# Patient Record
Sex: FEMALE | Race: WHITE | NOT HISPANIC OR LATINO | Employment: FULL TIME
[De-identification: names, ages, dates, MRNs, and addresses within clinical notes are randomized per-mention and may not be internally consistent; named-entity substitution may affect disease eponyms.]

---

## 2017-11-12 ENCOUNTER — HEALTH MAINTENANCE LETTER (OUTPATIENT)
Age: 37
End: 2017-11-12

## 2020-03-01 ENCOUNTER — HEALTH MAINTENANCE LETTER (OUTPATIENT)
Age: 40
End: 2020-03-01

## 2020-11-16 ENCOUNTER — OFFICE VISIT (OUTPATIENT)
Dept: FAMILY MEDICINE | Facility: CLINIC | Age: 40
End: 2020-11-16
Payer: COMMERCIAL

## 2020-11-16 ENCOUNTER — HOSPITAL ENCOUNTER (OUTPATIENT)
Dept: MRI IMAGING | Facility: CLINIC | Age: 40
Discharge: HOME | End: 2020-11-16
Attending: NURSE PRACTITIONER | Admitting: NURSE PRACTITIONER
Payer: COMMERCIAL

## 2020-11-16 ENCOUNTER — ANCILLARY PROCEDURE (OUTPATIENT)
Dept: GENERAL RADIOLOGY | Facility: CLINIC | Age: 40
End: 2020-11-16
Attending: NURSE PRACTITIONER
Payer: COMMERCIAL

## 2020-11-16 VITALS
HEIGHT: 68 IN | WEIGHT: 172 LBS | HEART RATE: 80 BPM | TEMPERATURE: 98.5 F | SYSTOLIC BLOOD PRESSURE: 112 MMHG | RESPIRATION RATE: 18 BRPM | BODY MASS INDEX: 26.07 KG/M2 | DIASTOLIC BLOOD PRESSURE: 72 MMHG

## 2020-11-16 DIAGNOSIS — S39.012A STRAIN OF LUMBAR REGION, INITIAL ENCOUNTER: Primary | ICD-10-CM

## 2020-11-16 DIAGNOSIS — M51.369 DDD (DEGENERATIVE DISC DISEASE), LUMBAR: ICD-10-CM

## 2020-11-16 DIAGNOSIS — T14.8XXA NERVE COMPRESSION: ICD-10-CM

## 2020-11-16 DIAGNOSIS — S39.012A STRAIN OF LUMBAR REGION, INITIAL ENCOUNTER: ICD-10-CM

## 2020-11-16 PROCEDURE — 72100 X-RAY EXAM L-S SPINE 2/3 VWS: CPT | Performed by: RADIOLOGY

## 2020-11-16 PROCEDURE — 99214 OFFICE O/P EST MOD 30 MIN: CPT | Performed by: NURSE PRACTITIONER

## 2020-11-16 PROCEDURE — 72148 MRI LUMBAR SPINE W/O DYE: CPT

## 2020-11-16 RX ORDER — CYCLOBENZAPRINE HCL 5 MG
5-10 TABLET ORAL 3 TIMES DAILY PRN
Qty: 60 TABLET | Refills: 0 | Status: SHIPPED | OUTPATIENT
Start: 2020-11-16 | End: 2021-03-22

## 2020-11-16 RX ORDER — HYDROCODONE BITARTRATE AND ACETAMINOPHEN 5; 325 MG/1; MG/1
1 TABLET ORAL EVERY 12 HOURS PRN
Qty: 14 TABLET | Refills: 0 | Status: SHIPPED | OUTPATIENT
Start: 2020-11-16 | End: 2020-11-19

## 2020-11-16 ASSESSMENT — MIFFLIN-ST. JEOR: SCORE: 1505.75

## 2020-11-16 NOTE — LETTER
[facility]  [address] [place] [address]  Phone: [phone]  Fax: [phone]    November 16, 2020        Pattie Easton  [address] [facility] [address]          To whom it may concern:    RE: Pattie Easton    Patient was seen and treated today at our clinic and missed work.  Patient may return to work 11/23/2020  with the following:  Light duty-unable to repetitively lift more than 5 lbs pounds    Please contact me for questions or concerns.      Sincerely,        ALFREDO Tapia CNP

## 2020-11-16 NOTE — PROGRESS NOTES
"Subjective     Pattie Easton is a 39 year old female who presents to clinic today for the following health issues:    HPI         Back Pain  Onset/Duration: on and off for months- worse the last three days  Description:   Location of pain: low back bilateral and middle of back bilateral  Character of pain: sharp  Pain radiation: both hips and legs  New numbness or weakness in legs, not attributed to pain: YES  Intensity: moderate  Progression of Symptoms: worsening  History:   Specific cause: none  Pain interferes with job: YES  History of back problems: Yes  Any previous MRI or X-rays: None  Sees a specialist for back pain: No  Alleviating factors:   Improved by: nothing    Precipitating factors:  Worsened by: Nothing  Therapies tried and outcome: advil, tizanidine    Accompanying Signs & Symptoms:  Risk of Fracture: None  Risk of Cauda Equina: None  Risk of Infection: None  Risk of Cancer: None  Risk of Ankylosing Spondylitis: Onset at age <35, male, AND morning back stiffness  no       Review of Systems   Constitutional, HEENT, cardiovascular, pulmonary, GI, , musculoskeletal, neuro, skin, endocrine and psych systems are negative, except as otherwise noted.      Objective    /72 (BP Location: Right arm, Cuff Size: Adult Regular)   Pulse 80   Temp 98.5  F (36.9  C) (Tympanic)   Resp 18   Ht 1.715 m (5' 7.5\")   Wt 78 kg (172 lb)   BMI 26.54 kg/m    Body mass index is 26.54 kg/m .  Physical Exam   GENERAL: healthy, alert and no distress  EYES: Eyes grossly normal to inspection, PERRL and conjunctivae and sclerae normal  NECK: no adenopathy, no asymmetry, masses, or scars and thyroid normal to palpation  RESP: lungs clear to auscultation - no rales, rhonchi or wheezes  CV: regular rate and rhythm, normal S1 S2, no S3 or S4, no murmur, click or rub, no peripheral edema and peripheral pulses strong  MS: no gross musculoskeletal defects noted, no edema  SKIN: no suspicious lesions or rashes  NEURO: " Normal strength and tone, mentation intact and speech normal  PSYCH: mentation appears normal, affect normal/bright    Tender:  lumbar facet joints, left para lumbar muscles, right para lumbar muscles  Non-tender:  thoracic spinous processes, thoracic facet joints, left parathoracic muscles, right parathoracic muscles, left SI joint, right SI joint, left sciatic notch, right sciatic notch  Range of Motion:  left lateral thoracic bending   full, right lateral thoracic bending  full, left thoracic rotation  full, right thoracic rotation  full, lumbar flexion  decreased, painful, lumbar extension  decreased, painful, left lateral lumbar bending  decreased, painful, right lateral lumbar bending  decreased, painful, left lateral lumbar rotation  decreased, painful, right lateral lumbar rotation  decreased, painful  Strength:  able to heel walk    Hip Exam: Hip ROM full      X-ray reviewed and interpreted by myself in office no acute findings will await final radiology report    Results for orders placed or performed in visit on 11/16/20   XR Lumbar Spine 2/3 Views     Status: None    Narrative    LUMBAR SPINE TWO-THREE  VIEWS  11/16/2020 10:59 AM     HISTORY: lumbar strain; Strain of lumbar region, initial encounter    COMPARISON: None.      Impression    IMPRESSION: 5 lumbar type vertebrae. Mild straightening of the lumbar  lordosis. No fractures are identified. Mild degenerative changes. Mild  loss of disc space height at L4-5.    DAWIT KAM MD         Assessment & Plan     Strain of lumbar region, initial encounter    - XR Lumbar Spine 2/3 Views; Future  - cyclobenzaprine (FLEXERIL) 5 MG tablet; Take 1-2 tablets (5-10 mg) by mouth 3 times daily as needed for muscle spasms  - HYDROcodone-acetaminophen (NORCO) 5-325 MG tablet; Take 1 tablet by mouth every 12 hours as needed for pain  - MR Lumbar Spine w/o Contrast; Future    DDD (degenerative disc disease), lumbar  Due to chronic pain not resolving will obtain MRI.  If  "negative will have patient start physical therapy or follow-up with spinal specialist   - MR Lumbar Spine w/o Contrast; Future     Tobacco Cessation:   reports that she has been smoking cigarettes. She has a 1.40 pack-year smoking history. She has never used smokeless tobacco.        BMI:   Estimated body mass index is 26.54 kg/m  as calculated from the following:    Height as of this encounter: 1.715 m (5' 7.5\").    Weight as of this encounter: 78 kg (172 lb).            See Patient Instructions    Return in about 1 week (around 11/23/2020), or if symptoms worsen or fail to improve.    ALFREDO Tapia CNP  [facility]    "

## 2020-11-16 NOTE — PATIENT INSTRUCTIONS
Patient Education     Back Care Tips     Caring for your back  These are things you can do to prevent a recurrence of acute back pain and to reduce symptoms from chronic back pain:    Stay at a healthy weight. If you are overweight, losing weight will help most types of back pain.    Exercise is an important part of recovery from most types of back pain. The muscles behind and in front of the spine support the back. This means strengthening both the back muscles and the abdominal muscles will provide better support for your spine.     Swimming and brisk walking are good overall exercises to improve your fitness level.    Practice safe lifting methods (see below).    Practice good posture when sitting, standing, and walking. Don't sit for a long time. This puts more stress on the lower back than standing or walking.    Wear quality shoes with good arch support. Foot and ankle alignment can affect back symptoms. Don't wear high heels.    Therapeutic massage can help relax the back muscles without stretching them.    During the first 24 to 72 hours after an acute injury or flare-up of chronic back pain, put an ice pack on the painful area for 20 minutes and then remove it for 20 minutes. Do thisover a period of 60 to 90 minutes, or several times a day. As a safety precaution, don't use a heating pad at bedtime. Sleeping on a heating pad can lead to skin burns or tissue damage.    You can alternate using ice and heat.  Medicines  Talk with your healthcare provider before using medicines, especially if you have other health problems or are taking other medicines.    You may use over-the-counter medicines, such as acetaminophen, ibuprofen, or naprosyn to control pain, unless your healthcare provider prescribed other pain medicine. Talk with your healthcare provider before taking any medicines if you have a chronic condition such as diabetes, liver or kidney disease, stomach ulcers, or digestive bleeding, or are taking  blood thinners.    Be careful if you are given prescription pain medicines, opioids, or medicine for muscle spasm. They can cause drowsiness, and affect your coordination, reflexes, and judgment. Don't drive or operate heavy machinery while taking these types of medicines. Take prescription pain medicine only as prescribed by your healthcare provider.  Lumbar stretch  This simple stretch will help relax muscle spasm and keep your back more limber. If exercise makes your back pain worse, don t do it.    Lie on your back with your knees bent and both feet on the ground.    Slowly raise your left knee to your chest as you flatten your lower back against the floor. Hold for 5 seconds.    Relax and repeat the exercise with your right knee.    Do 10 of these exercises for each leg.  Safe lifting method    Don t bend over at the waist to lift an object off the floor.  Instead, bend your knees and hips in a squat.     Keep your back and head upright    Hold the object close to your body, directly in front of you.    Straighten your legs to lift the object.     Lower the object to the floor in the reverse fashion.    If you must slide something across the floor, push it.    Posture tips  Sitting  Sit in chairs with straight backs or low-back support. Keep your knees lower than your hips, with your feet flat on the floor.  When driving, sit up straight. Adjust the seat forward so you are not leaning toward the steering wheel.  A small pillow or rolled towel behind your lower back may help if you are driving long distances.   Standing  When standing for long periods, shift most of your weight to one leg at a time. Switch legs every few minutes.   Sleeping  The best way to sleep is on your side with your knees bent. Put a low pillow under your head to support your neck in a neutral spine position. Don't use thick pillows that bend your neck to one side. Put a pillow between your legs to further relax your lower back. If you  sleep on your back, put pillows under your knees to support your legs in a slightly flexed position. Use a firm mattress. If your mattress sags, replace it, or use a 1/2-inch plywood board under the mattress to add support.  Follow-up care  Follow up with your healthcare provider, or as advised.  If X-rays, a CT scan or an MRI scan were taken, they may be reviewed by a radiologist. You will be told of any new findings that may affect your care.  Call 911  Call 911 if any of the following occur:    Trouble breathing    Confusion    Very drowsy    Fainting or loss of consciousness    Rapid or very slow heart rate    Loss of  bowel or bladder control  When to seek medical advice  Call your healthcare provider right away if any of the following occur:    Pain becomes worse or spreads to your arms or legs    Weakness or numbness in one or both arms or legs    Numbness in the groin area  [employer] last reviewed this educational content on 11/1/2019 2000-2020 [employer]. [address] [place] [address]. All rights reserved. This information is not intended as a substitute for professional medical care. Always follow your healthcare professional's instructions.           Patient Education     Understanding Lumbosacral Strain    Lumbosacral strain is a medical term for an injury that causes low back pain. The lumbosacral area (low back) is between the bottom of the ribcage and the top of the buttocks. A strain is tearing of muscles and tendons. These tears can be very small but still cause pain.   How a lumbosacral strain happens  Muscles and tendons connected to the spine can be strained in a number of ways:    Sitting or standing in the same position for long periods of time. This can harm the low back over time. Poor posture can make low back pain more likely.    Moving the muscles and tendons past their usual range of motion. This can cause a sudden injury. This can happen when you twist, bend  over, or lift something heavy. Not using correct technique for sports or tasks like lifting can make back injury more likely.    Accidents or falls  Lumbosacral strain can be caused by other problems, but these are less common.  Symptoms of lumbosacral strain  Symptoms may include:    Pain in the back, often on one side    Pain that gets worse with movement and gets better with rest    Inability to move as freely as usual    Swelling, slight redness, and skin warmth in the painful area  Treatment for lumbosacral strain  Low back pain often goes away by itself within several weeks. But it often comes back. Treatment focuses on reducing pain and avoiding further injury. Bed rest is usually not recommended for low back pain. Treatments may include:     Avoiding or changing the action that caused the problem. This helps prevent injuring the tissues again.    Prescription or over-the-counter medicines. These help reduce inflammation, swelling, and pain. NSAIDs (nonsteroidal anti-inflammatory drugs) are the most common medicines used. Medicines may be prescribed or bought over the counter. They may be given as pills. Or they may be put on the skin a gel, cream, or patch.    Cold or heat packs. These help reduce pain and swelling.    Stretching and other exercises.  These improve flexibility and strength.    Physical therapy. This usually includes exercises and other treatments.    Injections of medicine. This may relieve symptoms. The medicine is usually a corticosteroid. This is a strong anti-inflammatory medicine.  If these treatments don't relieve symptoms, your healthcare provider may order imaging tests to learn more about the problem. Sometimes you may need surgery.   Possible complications of lumbosacral strain  If the cause of the pain is not addressed, symptoms may return or get worse. Follow your healthcare provider s instructions on lifestyle changes and treating your back.   When to call your healthcare  provider  Call your healthcare provider right away if you have any of these:    Fever of 100.4 F (38 C) or higher, or as directed by your rrovider    Chills    Numbness, tingling, or weakness    Problems with bowel or bladder control, or problems having sex    Pain that does not go away, or gets worse    New symptoms  [employer] last reviewed this educational content on 6/1/2019 2000-2020 [employer], "[employer]. [address] [place] [address], [place] [address]. All rights reserved. This information is not intended as a substitute for professional medical care. Always follow your healthcare professional's instructions.

## 2020-12-14 ENCOUNTER — HEALTH MAINTENANCE LETTER (OUTPATIENT)
Age: 40
End: 2020-12-14

## 2020-12-16 ENCOUNTER — TRANSFERRED RECORDS (OUTPATIENT)
Dept: HEALTH INFORMATION MANAGEMENT | Facility: CLINIC | Age: 40
End: 2020-12-16

## 2021-03-22 ENCOUNTER — OFFICE VISIT (OUTPATIENT)
Dept: FAMILY MEDICINE | Facility: CLINIC | Age: 41
End: 2021-03-22
Payer: COMMERCIAL

## 2021-03-22 VITALS
BODY MASS INDEX: 26.37 KG/M2 | DIASTOLIC BLOOD PRESSURE: 60 MMHG | TEMPERATURE: 98.9 F | SYSTOLIC BLOOD PRESSURE: 124 MMHG | HEIGHT: 68 IN | WEIGHT: 174 LBS | HEART RATE: 72 BPM

## 2021-03-22 DIAGNOSIS — Z00.00 ROUTINE GENERAL MEDICAL EXAMINATION AT A HEALTH CARE FACILITY: Primary | ICD-10-CM

## 2021-03-22 DIAGNOSIS — R53.83 FATIGUE, UNSPECIFIED TYPE: ICD-10-CM

## 2021-03-22 DIAGNOSIS — K58.2 IRRITABLE BOWEL SYNDROME WITH BOTH CONSTIPATION AND DIARRHEA: ICD-10-CM

## 2021-03-22 DIAGNOSIS — F17.200 NICOTINE DEPENDENCE, UNCOMPLICATED, UNSPECIFIED NICOTINE PRODUCT TYPE: ICD-10-CM

## 2021-03-22 LAB
ALBUMIN SERPL-MCNC: 3.8 G/DL (ref 3.4–5)
ALP SERPL-CCNC: 58 U/L (ref 40–150)
ALT SERPL W P-5'-P-CCNC: 24 U/L (ref 0–50)
ANION GAP SERPL CALCULATED.3IONS-SCNC: 3 MMOL/L (ref 3–14)
AST SERPL W P-5'-P-CCNC: 7 U/L (ref 0–45)
BILIRUB SERPL-MCNC: 0.2 MG/DL (ref 0.2–1.3)
BUN SERPL-MCNC: 10 MG/DL (ref 7–30)
CALCIUM SERPL-MCNC: 8.9 MG/DL (ref 8.5–10.1)
CHLORIDE SERPL-SCNC: 106 MMOL/L (ref 94–109)
CO2 SERPL-SCNC: 29 MMOL/L (ref 20–32)
CREAT SERPL-MCNC: 0.61 MG/DL (ref 0.52–1.04)
ERYTHROCYTE [DISTWIDTH] IN BLOOD BY AUTOMATED COUNT: 11.3 % (ref 10–15)
GFR SERPL CREATININE-BSD FRML MDRD: >90 ML/MIN/{1.73_M2}
GLUCOSE SERPL-MCNC: 107 MG/DL (ref 70–99)
HCT VFR BLD AUTO: 36.1 % (ref 35–47)
HGB BLD-MCNC: 12.3 G/DL (ref 11.7–15.7)
LIPASE SERPL-CCNC: 78 U/L (ref 73–393)
MCH RBC QN AUTO: 31.9 PG (ref 26.5–33)
MCHC RBC AUTO-ENTMCNC: 34.1 G/DL (ref 31.5–36.5)
MCV RBC AUTO: 94 FL (ref 78–100)
PLATELET # BLD AUTO: 249 10E9/L (ref 150–450)
POTASSIUM SERPL-SCNC: 3.8 MMOL/L (ref 3.4–5.3)
PROT SERPL-MCNC: 7 G/DL (ref 6.8–8.8)
RBC # BLD AUTO: 3.85 10E12/L (ref 3.8–5.2)
SODIUM SERPL-SCNC: 138 MMOL/L (ref 133–144)
TSH SERPL DL<=0.005 MIU/L-ACNC: 1.42 MU/L (ref 0.4–4)
VIT B12 SERPL-MCNC: 432 PG/ML (ref 193–986)
WBC # BLD AUTO: 6.4 10E9/L (ref 4–11)

## 2021-03-22 PROCEDURE — 83690 ASSAY OF LIPASE: CPT | Performed by: NURSE PRACTITIONER

## 2021-03-22 PROCEDURE — 99395 PREV VISIT EST AGE 18-39: CPT | Performed by: NURSE PRACTITIONER

## 2021-03-22 PROCEDURE — 83516 IMMUNOASSAY NONANTIBODY: CPT | Mod: 59 | Performed by: NURSE PRACTITIONER

## 2021-03-22 PROCEDURE — 85027 COMPLETE CBC AUTOMATED: CPT | Performed by: NURSE PRACTITIONER

## 2021-03-22 PROCEDURE — 82607 VITAMIN B-12: CPT | Performed by: NURSE PRACTITIONER

## 2021-03-22 PROCEDURE — 83516 IMMUNOASSAY NONANTIBODY: CPT | Performed by: NURSE PRACTITIONER

## 2021-03-22 PROCEDURE — 84443 ASSAY THYROID STIM HORMONE: CPT | Performed by: NURSE PRACTITIONER

## 2021-03-22 PROCEDURE — 36415 COLL VENOUS BLD VENIPUNCTURE: CPT | Performed by: NURSE PRACTITIONER

## 2021-03-22 PROCEDURE — 80053 COMPREHEN METABOLIC PANEL: CPT | Performed by: NURSE PRACTITIONER

## 2021-03-22 PROCEDURE — 99213 OFFICE O/P EST LOW 20 MIN: CPT | Mod: 25 | Performed by: NURSE PRACTITIONER

## 2021-03-22 PROCEDURE — 82306 VITAMIN D 25 HYDROXY: CPT | Performed by: NURSE PRACTITIONER

## 2021-03-22 ASSESSMENT — ENCOUNTER SYMPTOMS
ABDOMINAL PAIN: 0
PARESTHESIAS: 0
NAUSEA: 0
SHORTNESS OF BREATH: 0
COUGH: 0
BREAST MASS: 0
EYE PAIN: 0
ARTHRALGIAS: 0
JOINT SWELLING: 0
DIARRHEA: 1
WEAKNESS: 0
HEMATURIA: 0
CHILLS: 0
FREQUENCY: 0
FEVER: 0
PALPITATIONS: 0
NERVOUS/ANXIOUS: 1
HEADACHES: 1
DYSURIA: 0
MYALGIAS: 0
HEMATOCHEZIA: 0
DIZZINESS: 0
HEARTBURN: 0
SORE THROAT: 0
CONSTIPATION: 1

## 2021-03-22 ASSESSMENT — MIFFLIN-ST. JEOR: SCORE: 1509.82

## 2021-03-22 NOTE — PROGRESS NOTES
SUBJECTIVE:   CC: Pattie Easton is an 40 year old woman who presents for preventive health visit.     Patient has been advised of split billing requirements and indicates understanding: Yes  Healthy Habits:     Getting at least 3 servings of Calcium per day:  Yes    Bi-annual eye exam:  Yes    Dental care twice a year:  NO    Sleep apnea or symptoms of sleep apnea:  Daytime drowsiness    Diet:  Regular (no restrictions)    Frequency of exercise:  2-3 days/week    Duration of exercise:  30-45 minutes    Taking medications regularly:  Yes    Medication side effects:  Not applicable    PHQ-2 Total Score: 2    Additional concerns today:  Yes            Today's PHQ-2 Score:   PHQ-2 ( 1999 [employer]) 3/22/2021   Q1: Little interest or pleasure in doing things 1   Q2: Feeling down, depressed or hopeless 1   PHQ-2 Score 2   Q1: Little interest or pleasure in doing things Several days   Q2: Feeling down, depressed or hopeless Several days   PHQ-2 Score 2       Abuse: Current or Past (Physical, Sexual or Emotional) - No  Do you feel safe in your environment? Yes    Have you ever done Advance Care Planning? (For example, a Health Directive, POLST, or a discussion with a medical provider or your loved ones about your wishes): No, advance care planning information given to patient to review.  Patient declined advance care planning discussion at this time.    Social History     Tobacco Use     Smoking status: Current Every Day Smoker     Packs/day: 0.10     Years: 14.00     Pack years: 1.40     Types: Cigarettes     Smokeless tobacco: Never Used     Tobacco comment: 2-3 cig/day   Substance Use Topics     Alcohol use: No     Comment: ETOH before she knew she was pregnant     If you drink alcohol do you typically have >3 drinks per day or >7 drinks per week? No    Alcohol Use 3/22/2021   Prescreen: >3 drinks/day or >7 drinks/week? No       Reviewed orders with patient.  Reviewed health maintenance and updated orders accordingly  - Yes  Labs reviewed in EPIC  BP Readings from Last 3 Encounters:   03/22/21 124/60   11/16/20 112/72   06/29/11 110/60    Wt Readings from Last 3 Encounters:   03/22/21 78.9 kg (174 lb)   11/16/20 78 kg (172 lb)   06/29/11 79.4 kg (175 lb 1.6 oz)                  There is no problem list on file for this patient.    Past Surgical History:   Procedure Laterality Date     CYSTECTOMY PILONIDAL       HYSTERECTOMY, PAP NO LONGER INDICATED  2018       Social History     Tobacco Use     Smoking status: Current Every Day Smoker     Packs/day: 0.10     Years: 14.00     Pack years: 1.40     Types: Cigarettes     Smokeless tobacco: Never Used     Tobacco comment: 2-3 cig/day   Substance Use Topics     Alcohol use: No     Comment: ETOH before she knew she was pregnant     Family History   Problem Relation Age of Onset     Alcohol/Drug Father      Hypertension Maternal Grandmother      Alcohol/Drug Sister          Current Outpatient Medications   Medication Sig Dispense Refill     MELATONIN PO        VITAMIN D, CHOLECALCIFEROL, PO Take by mouth daily       No Known Allergies  No lab results found.     Breast CA Risk Screening:  Breast CA Risk Assessment (FHS-7) 3/22/2021   Do you have a family history of breast, colon, or ovarian cancer? No / Unknown       click delete button to remove this line now  Patient under 40 years of age: Routine Mammogram Screening not recommended.   Pertinent mammograms are reviewed under the imaging tab.    History of abnormal Pap smear: Status post benign hysterectomy. Health Maintenance and Surgical History updated.  PAP / HPV 6/29/2011   PAP NIL     Reviewed and updated as needed this visit by clinical staff                 Reviewed and updated as needed this visit by Provider                Past Medical History:   Diagnosis Date     NO ACTIVE PROBLEMS       Past Surgical History:   Procedure Laterality Date     CYSTECTOMY PILONIDAL       HYSTERECTOMY, PAP NO LONGER INDICATED  2018     OB History  "   Para Term  AB Living   2 1 1 0 0 1   SAB TAB Ectopic Multiple Live Births   0 0 0 0 1      # Outcome Date GA Lbr Tremaine/2nd Weight Sex Delivery Anes PTL Lv   2 Term 07 40w0d 15:00 3.884 kg (8 lb 9 oz) F    MIKKI      Birth Comments: Uncomplicated       Name: Erick York                Birth Comments: System Generated. Please review and update pregnancy details.      Obstetric Comments   EDC 2012  Based on LMP.  Happy to be pregnant.       Review of Systems   Constitutional: Negative for chills and fever.   HENT: Negative for congestion, ear pain, hearing loss and sore throat.    Eyes: Negative for pain and visual disturbance.   Respiratory: Negative for cough and shortness of breath.    Cardiovascular: Negative for chest pain, palpitations and peripheral edema.   Gastrointestinal: Positive for constipation and diarrhea. Negative for abdominal pain, heartburn, hematochezia and nausea.   Breasts:  Negative for tenderness, breast mass and discharge.   Genitourinary: Negative for dysuria, frequency, genital sores, hematuria, pelvic pain, urgency, vaginal bleeding and vaginal discharge.   Musculoskeletal: Negative for arthralgias, joint swelling and myalgias.   Skin: Negative for rash.   Neurological: Positive for headaches. Negative for dizziness, weakness and paresthesias.   Psychiatric/Behavioral: Negative for mood changes. The patient is nervous/anxious.           OBJECTIVE:   /60 (BP Location: Right arm, Cuff Size: Adult Regular)   Pulse 72   Temp 98.9  F (37.2  C) (Tympanic)   Ht 1.715 m (5' 7.5\")   Wt 78.9 kg (174 lb)   BMI 26.85 kg/m    Physical Exam  GENERAL APPEARANCE: healthy, alert and no distress  EYES: Eyes grossly normal to inspection, PERRL and conjunctivae and sclerae normal  HENT: ear canals and TM's normal, nose and mouth without ulcers or lesions, oropharynx clear and oral mucous membranes moist  NECK: no adenopathy, no asymmetry, masses, or scars and " thyroid normal to palpation  RESP: lungs clear to auscultation - no rales, rhonchi or wheezes  BREAST: normal without masses, tenderness or nipple discharge and no palpable axillary masses or adenopathy  CV: regular rate and rhythm, normal S1 S2, no S3 or S4, no murmur, click or rub, no peripheral edema and peripheral pulses strong  ABDOMEN: soft, nontender, no hepatosplenomegaly, no masses and bowel sounds normal  MS: no musculoskeletal defects are noted and gait is age appropriate without ataxia  SKIN: no suspicious lesions or rashes  NEURO: Normal strength and tone, sensory exam grossly normal, mentation intact and speech normal  PSYCH: mentation appears normal and affect normal/bright    Diagnostic Test Results:  Labs reviewed in Epic  Results for orders placed or performed in visit on 03/22/21   CBC with platelets     Status: None   Result Value Ref Range    WBC 6.4 4.0 - 11.0 10e9/L    RBC Count 3.85 3.8 - 5.2 10e12/L    Hemoglobin 12.3 11.7 - 15.7 g/dL    Hematocrit 36.1 35.0 - 47.0 %    MCV 94 78 - 100 fl    MCH 31.9 26.5 - 33.0 pg    MCHC 34.1 31.5 - 36.5 g/dL    RDW 11.3 10.0 - 15.0 %    Platelet Count 249 150 - 450 10e9/L   TSH with free T4 reflex     Status: None   Result Value Ref Range    TSH 1.42 0.40 - 4.00 mU/L   Tissue transglutaminase melyssa IgA and IgG     Status: None   Result Value Ref Range    Tissue Transglutaminase Antibody IgA 1 <7 U/mL    Tissue Transglutaminase Melyssa IgG <1 <7 U/mL   Comprehensive metabolic panel     Status: Abnormal   Result Value Ref Range    Sodium 138 133 - 144 mmol/L    Potassium 3.8 3.4 - 5.3 mmol/L    Chloride 106 94 - 109 mmol/L    Carbon Dioxide 29 20 - 32 mmol/L    Anion Gap 3 3 - 14 mmol/L    Glucose 107 (H) 70 - 99 mg/dL    Urea Nitrogen 10 7 - 30 mg/dL    Creatinine 0.61 0.52 - 1.04 mg/dL    GFR Estimate >90 >60 mL/min/[1.73_m2]    GFR Estimate If Black >90 >60 mL/min/[1.73_m2]    Calcium 8.9 8.5 - 10.1 mg/dL    Bilirubin Total 0.2 0.2 - 1.3 mg/dL    Albumin 3.8  "3.4 - 5.0 g/dL    Protein Total 7.0 6.8 - 8.8 g/dL    Alkaline Phosphatase 58 40 - 150 U/L    ALT 24 0 - 50 U/L    AST 7 0 - 45 U/L   Lipase     Status: None   Result Value Ref Range    Lipase 78 73 - 393 U/L   Vitamin D Deficiency     Status: None   Result Value Ref Range    Vitamin D Deficiency screening 41 20 - 75 ug/L   Vitamin B12     Status: None   Result Value Ref Range    Vitamin B12 432 193 - 986 pg/mL       ASSESSMENT/PLAN:   (Z00.00) Routine general medical examination at a health care facility  (primary encounter diagnosis)  Comment:   Plan: CBC with platelets, TSH with free T4 reflex,         Comprehensive metabolic panel            (R53.83) Fatigue, unspecified type  Comment: Labs within normal limits Plan: TSH with free T4 reflex, Vitamin D Deficiency,         Vitamin B12, OFFICE/OUTPT VISIT,EST,LEVL III         (K58.2) Irritable bowel syndrome with both constipation and diarrhea  Comment: Patient will follow-up with Gastroenterology   Plan: Tissue transglutaminase melyssa IgA and IgG,         Lipase, OFFICE/OUTPT VISIT,EST,LEVL III      (F17.200) Nicotine dependence, uncomplicated, unspecified nicotine product type  Comment:   Plan: OFFICE/OUTPT VISIT,EST,LEVL III              Patient has been advised of split billing requirements and indicates understanding: Yes  COUNSELING:  Reviewed preventive health counseling, as reflected in patient instructions       Regular exercise       Healthy diet/nutrition       Vision screening       Hearing screening       Contraception       Family planning       Folic Acid       Osteoporosis prevention/bone health       Safe sex practices/STD prevention       ([place])menopause management    Estimated body mass index is 26.54 kg/m  as calculated from the following:    Height as of 11/16/20: 1.715 m (5' 7.5\").    Weight as of 11/16/20: 78 kg (172 lb).        She reports that she has been smoking cigarettes. She has a 1.40 pack-year smoking history. She has never used " smokeless tobacco.  Tobacco Cessation Action Plan:   Information offered: Patient not interested at this time      Counseling Resources:  ATP IV Guidelines  Pooled Cohorts Equation Calculator  Breast Cancer Risk Calculator  BRCA-Related Cancer Risk Assessment: FHS-7 Tool  FRAX Risk Assessment  ICSI Preventive Guidelines  Dietary Guidelines for Americans, 2010  USDA's MyPlate  ASA Prophylaxis  Lung CA Screening    ALFREDO Tapia CNP  [facility]

## 2021-03-22 NOTE — LETTER
March 26, 2021      Pattie Easton  [address] [place] [address]        Dear ,    We are writing to inform you of your test results.    Here are your recent results. Celiacs was negative.  Comprehensive and lipase was within normal.  Your thyroid,  Vit d and Vit b 12 was within normal limits    Resulted Orders   CBC with platelets   Result Value Ref Range    WBC 6.4 4.0 - 11.0 10e9/L    RBC Count 3.85 3.8 - 5.2 10e12/L    Hemoglobin 12.3 11.7 - 15.7 g/dL    Hematocrit 36.1 35.0 - 47.0 %    MCV 94 78 - 100 fl    MCH 31.9 26.5 - 33.0 pg    MCHC 34.1 31.5 - 36.5 g/dL    RDW 11.3 10.0 - 15.0 %    Platelet Count 249 150 - 450 10e9/L   TSH with free T4 reflex   Result Value Ref Range    TSH 1.42 0.40 - 4.00 mU/L   Tissue transglutaminase melyssa IgA and IgG   Result Value Ref Range    Tissue Transglutaminase Antibody IgA 1 <7 U/mL      Comment:      Negative  The tTG-IgA assay has limited utility for patients with decreased levels of   IgA. Screening for celiac disease should include IgA testing to rule out   selective IgA deficiency and to guide selection and interpretation of   serological testing. tTG-IgG testing may be positive in celiac disease   patients with IgA deficiency.      Tissue Transglutaminase Melyssa IgG <1 <7 U/mL      Comment:      Negative   Comprehensive metabolic panel   Result Value Ref Range    Sodium 138 133 - 144 mmol/L    Potassium 3.8 3.4 - 5.3 mmol/L    Chloride 106 94 - 109 mmol/L    Carbon Dioxide 29 20 - 32 mmol/L    Anion Gap 3 3 - 14 mmol/L    Glucose 107 (H) 70 - 99 mg/dL      Comment:      Non Fasting    Urea Nitrogen 10 7 - 30 mg/dL    Creatinine 0.61 0.52 - 1.04 mg/dL    GFR Estimate >90 >60 mL/min/[1.73_m2]      Comment:      Non  GFR Calc  Starting 12/18/2018, serum creatinine based estimated GFR (eGFR) will be   calculated using the Chronic Kidney Disease Epidemiology Collaboration   (CKD-EPI) equation.      GFR Estimate If Black >90 >60 mL/min/[1.73_m2]       Comment:       GFR Calc  Starting 12/18/2018, serum creatinine based estimated GFR (eGFR) will be   calculated using the Chronic Kidney Disease Epidemiology Collaboration   (CKD-EPI) equation.      Calcium 8.9 8.5 - 10.1 mg/dL    Bilirubin Total 0.2 0.2 - 1.3 mg/dL    Albumin 3.8 3.4 - 5.0 g/dL    Protein Total 7.0 6.8 - 8.8 g/dL    Alkaline Phosphatase 58 40 - 150 U/L    ALT 24 0 - 50 U/L    AST 7 0 - 45 U/L   Lipase   Result Value Ref Range    Lipase 78 73 - 393 U/L   Vitamin D Deficiency   Result Value Ref Range    Vitamin D Deficiency screening 41 20 - 75 ug/L      Comment:      Season, race, dietary intake, and treatment affect the concentration of   25-hydroxy-Vitamin D. Values may decrease during winter months and increase   during summer months. Values 20-29 ug/L may indicate Vitamin D insufficiency   and values <20 ug/L may indicate Vitamin D deficiency.  Vitamin D determination is routinely performed by an immunoassay specific for   25 hydroxyvitamin D3.  If an individual is on vitamin D2 (ergocalciferol)   supplementation, please specify 25 OH vitamin D2 and D3 level determination by   LCMSMS test VITD23.     Vitamin B12   Result Value Ref Range    Vitamin B12 432 193 - 986 pg/mL       If you have any questions or concerns, please call the clinic at the number listed above.       Sincerely,      ALFREDO Tapia CNP

## 2021-03-22 NOTE — PATIENT INSTRUCTIONS
Preventive Health Recommendations  Female Ages 26 - 39  Yearly exam:   See your health care provider every year in order to    Review health changes.     Discuss preventive care.      Review your medicines if you your doctor has prescribed any.    Until age 30: Get a Pap test every three years (more often if you have had an abnormal result).    After age 30: Talk to your doctor about whether you should have a Pap test every 3 years or have a Pap test with HPV screening every 5 years.   You do not need a Pap test if your uterus was removed (hysterectomy) and you have not had cancer.  You should be tested each year for STDs (sexually transmitted diseases), if you're at risk.   Talk to your provider about how often to have your cholesterol checked.  If you are at risk for diabetes, you should have a diabetes test (fasting glucose).  Shots: Get a flu shot each year. Get a tetanus shot every 10 years.   Nutrition:     Eat at least 5 servings of fruits and vegetables each day.    Eat whole-grain bread, whole-wheat pasta and brown rice instead of white grains and rice.    Get adequate Calcium and Vitamin D.     Lifestyle    Exercise at least 150 minutes a week (30 minutes a day, 5 days of the week). This will help you control your weight and prevent disease.    Limit alcohol to one drink per day.    No smoking.     Wear sunscreen to prevent skin cancer.    See your dentist every six months for an exam and cleaning.      The Benefits of Living Tobacco-Free  What do you want to improve in your life by quitting tobacco? Whether you smoke cigarettes, e-cigarettes, cigars, or a pipe, or use smokeless tobacco, there are many reasons to quit. Check off some reasons you want to be tobacco-free.   Health benefits  ___  I want to breathe without coughing or feeling short of breath.   ___  I want to reduce my risk for lung cancer, oral cancer, heart disease, bone problems such as osteoporosis and fractures, and chronic lung  disease. Or reduce my risk for a serious lung injury called EVALI that may happen from vaping or using e-cigarettes.   ___  I want to have fewer wrinkles and softer skin.   ___  I want to improve my sense of taste and smell.   ___  For pregnant women: I want to reduce my risk for a miscarriage, stillbirth,  birth, or a low-birth-weight baby.   Personal benefits  ___  I want to be able to walk, go up stairs, and exercise without becoming out of breath.   ___  I want to feel more in control of my life.   ___  I want to have better-smelling hair, clothes, home, and car.   ___  I want to save time by not having to take smoke breaks, buy tobacco products, or hunt for a light.   ___  I want to have whiter teeth and fresher breath.   Family benefits  ___  I want to reduce my child's respiratory tract infections.   ___  I want to set a healthy example for my child.   ___  I want to have the energy needed to play with my children and not become out-of-breath   ___  I want to reduce my family s cancer risk.   Financial benefits  ___  I want to save hundreds of dollars each year that would be spent on tobacco products.   ___  I want to save money on medical bills.   ___  I want to save money on life, health, and car insurance premiums.   How much do you spend?  A tobacco habit is expensive. Do you know how much you spend on tobacco each year? Fill in the blanks below to find out:   A. How much do you pay for 1 pack of cigarettes, 1 cigar, 1 pouch of pipe tobacco, or 1 can of smokeless tobacco? $________   B. How many packs, cigars, pouches, or cans do you use each day? _______________   C. Multiply answer A with answer B:___________________  D. Multiply answer C with 365: $___________________. This is your yearly cost of using tobacco.   Besides the cost of buying tobacco, there are other costs. These include:     Costs of cleaning clothing, furniture, and car    Replacement costs for clothing and furniture    Medical  costs for tobacco-related illnesses    Missed days of work because of illness    Higher health, life, and car insurance premiums  Get help     QuitNow,  www.cdc.gov/tobacco/quit_smoking/, 800-QUIT-NOW (361-681-4832).    QuitLine,  www.smokefree.gov, 877-44U-QUIT (841-934-5735).    [employer] last reviewed this educational content on 4/1/2020 2000-2020 [employer]. All rights reserved. This information is not intended as a substitute for professional medical care. Always follow your healthcare professional's instructions.

## 2021-03-23 LAB
DEPRECATED CALCIDIOL+CALCIFEROL SERPL-MC: 41 UG/L (ref 20–75)
TTG IGA SER-ACNC: 1 U/ML
TTG IGG SER-ACNC: <1 U/ML

## 2021-06-28 ENCOUNTER — OFFICE VISIT (OUTPATIENT)
Dept: FAMILY MEDICINE | Facility: CLINIC | Age: 41
End: 2021-06-28
Payer: COMMERCIAL

## 2021-06-28 ENCOUNTER — ANCILLARY PROCEDURE (OUTPATIENT)
Dept: GENERAL RADIOLOGY | Facility: CLINIC | Age: 41
End: 2021-06-28
Attending: PHYSICIAN ASSISTANT
Payer: COMMERCIAL

## 2021-06-28 VITALS
SYSTOLIC BLOOD PRESSURE: 108 MMHG | BODY MASS INDEX: 27.87 KG/M2 | TEMPERATURE: 98.7 F | WEIGHT: 180.6 LBS | HEART RATE: 68 BPM | DIASTOLIC BLOOD PRESSURE: 76 MMHG | RESPIRATION RATE: 18 BRPM

## 2021-06-28 DIAGNOSIS — M53.3 SI (SACROILIAC) JOINT DYSFUNCTION: ICD-10-CM

## 2021-06-28 DIAGNOSIS — M76.31 IT BAND SYNDROME, RIGHT: ICD-10-CM

## 2021-06-28 DIAGNOSIS — G44.209 TENSION HEADACHE: ICD-10-CM

## 2021-06-28 DIAGNOSIS — G44.209 TENSION HEADACHE: Primary | ICD-10-CM

## 2021-06-28 DIAGNOSIS — G57.11 MERALGIA PARESTHETICA OF RIGHT SIDE: ICD-10-CM

## 2021-06-28 PROCEDURE — 72040 X-RAY EXAM NECK SPINE 2-3 VW: CPT | Performed by: RADIOLOGY

## 2021-06-28 PROCEDURE — 99214 OFFICE O/P EST MOD 30 MIN: CPT | Performed by: PHYSICIAN ASSISTANT

## 2021-06-28 PROCEDURE — 72100 X-RAY EXAM L-S SPINE 2/3 VWS: CPT | Performed by: RADIOLOGY

## 2021-06-28 PROCEDURE — 72202 X-RAY EXAM SI JOINTS 3/> VWS: CPT | Performed by: RADIOLOGY

## 2021-06-28 RX ORDER — METHOCARBAMOL 500 MG/1
500 TABLET, FILM COATED ORAL 4 TIMES DAILY PRN
Qty: 90 TABLET | Refills: 0 | Status: SHIPPED | OUTPATIENT
Start: 2021-06-28 | End: 2022-01-14

## 2021-06-28 ASSESSMENT — PAIN SCALES - GENERAL: PAINLEVEL: MODERATE PAIN (5)
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
## 2021-06-28 NOTE — PATIENT INSTRUCTIONS
Patient Education   For the next two weeks, I recommend you take Ibuprofen 600 mg three times per day regardless of your pain. This is to help with the inflammation and help this heal faster. You can also take Tylenol 1000 mg three times per day as well. This is safe to all take together.     Try the muscle relaxer at night to help you sleep.     Start PT for your hip pain.     Follow-up with me in 1 month to see how you are doing.

## 2021-07-13 ENCOUNTER — HOSPITAL ENCOUNTER (OUTPATIENT)
Dept: PHYSICAL THERAPY | Facility: CLINIC | Age: 41
Setting detail: THERAPIES SERIES
End: 2021-07-13
Attending: PHYSICIAN ASSISTANT
Payer: COMMERCIAL

## 2021-07-13 DIAGNOSIS — M53.3 SI (SACROILIAC) JOINT DYSFUNCTION: ICD-10-CM

## 2021-07-13 DIAGNOSIS — M76.31 IT BAND SYNDROME, RIGHT: ICD-10-CM

## 2021-07-13 PROCEDURE — 97140 MANUAL THERAPY 1/> REGIONS: CPT | Mod: GP | Performed by: PHYSICAL THERAPIST

## 2021-07-13 PROCEDURE — 97162 PT EVAL MOD COMPLEX 30 MIN: CPT | Mod: GP | Performed by: PHYSICAL THERAPIST

## 2021-07-13 PROCEDURE — 97110 THERAPEUTIC EXERCISES: CPT | Mod: GP | Performed by: PHYSICAL THERAPIST
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
## 2021-07-19 ENCOUNTER — TELEPHONE (OUTPATIENT)
Dept: FAMILY MEDICINE | Facility: CLINIC | Age: 41
End: 2021-07-19

## 2021-07-19 DIAGNOSIS — N39.3 FEMALE STRESS INCONTINENCE: Primary | ICD-10-CM

## 2021-07-19 NOTE — TELEPHONE ENCOUNTER
----- Message from Radha Duque PT sent at 7/13/2021  4:57 PM CDT -----  Moiz Blankenship is here in therapy currently and reports bladder leakage w/ cough/ sneeze/ laugh.  I would like her to see our pelvic floor therapist for evaluation.   If you are ok with this please put in an order for pelvic floor physical therapy.   Thank you    Radha Duque, PT

## 2021-07-20 ENCOUNTER — HOSPITAL ENCOUNTER (OUTPATIENT)
Dept: PHYSICAL THERAPY | Facility: CLINIC | Age: 41
Setting detail: THERAPIES SERIES
End: 2021-07-20
Attending: PHYSICIAN ASSISTANT
Payer: COMMERCIAL

## 2021-07-20 DIAGNOSIS — N39.3 FEMALE STRESS INCONTINENCE: ICD-10-CM

## 2021-07-20 PROCEDURE — 97162 PT EVAL MOD COMPLEX 30 MIN: CPT | Mod: GP | Performed by: PHYSICAL THERAPIST

## 2021-07-20 PROCEDURE — 97110 THERAPEUTIC EXERCISES: CPT | Mod: GP | Performed by: PHYSICAL THERAPIST
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
## 2021-07-26 ENCOUNTER — OFFICE VISIT (OUTPATIENT)
Dept: FAMILY MEDICINE | Facility: CLINIC | Age: 41
End: 2021-07-26
Payer: COMMERCIAL

## 2021-07-26 VITALS
SYSTOLIC BLOOD PRESSURE: 115 MMHG | BODY MASS INDEX: 27.58 KG/M2 | WEIGHT: 182 LBS | HEIGHT: 68 IN | TEMPERATURE: 98.6 F | HEART RATE: 77 BPM | OXYGEN SATURATION: 98 % | DIASTOLIC BLOOD PRESSURE: 78 MMHG | RESPIRATION RATE: 16 BRPM

## 2021-07-26 DIAGNOSIS — M54.2 NECK PAIN: Primary | ICD-10-CM

## 2021-07-26 DIAGNOSIS — G44.209 TENSION HEADACHE: ICD-10-CM

## 2021-07-26 PROCEDURE — 99214 OFFICE O/P EST MOD 30 MIN: CPT | Performed by: PHYSICIAN ASSISTANT

## 2021-07-26 ASSESSMENT — MIFFLIN-ST. JEOR: SCORE: 1546.11

## 2021-07-26 NOTE — PATIENT INSTRUCTIONS
Continue Ibuprofen and Robaxin for neck pain and headaches. You can increase Robaxin to 750 mg at each occurrence if needed.     Start the neck stretches and exercises in addition to the hip exercises you have been doing.     Follow-up with Neurology after you complete the MRI of your neck.
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
## 2021-07-30 ENCOUNTER — HOSPITAL ENCOUNTER (OUTPATIENT)
Dept: MRI IMAGING | Facility: CLINIC | Age: 41
Discharge: HOME | End: 2021-07-30
Attending: PHYSICIAN ASSISTANT | Admitting: PHYSICIAN ASSISTANT
Payer: COMMERCIAL

## 2021-07-30 DIAGNOSIS — G44.209 TENSION HEADACHE: ICD-10-CM

## 2021-07-30 DIAGNOSIS — M54.2 NECK PAIN: ICD-10-CM

## 2021-07-30 PROCEDURE — 72141 MRI NECK SPINE W/O DYE: CPT

## 2021-08-13 ENCOUNTER — HOSPITAL ENCOUNTER (OUTPATIENT)
Dept: PHYSICAL THERAPY | Facility: CLINIC | Age: 41
Setting detail: THERAPIES SERIES
End: 2021-08-13
Attending: PHYSICIAN ASSISTANT
Payer: COMMERCIAL

## 2021-08-13 PROCEDURE — 97140 MANUAL THERAPY 1/> REGIONS: CPT | Mod: GP | Performed by: PHYSICAL THERAPIST

## 2021-08-13 PROCEDURE — 97110 THERAPEUTIC EXERCISES: CPT | Mod: GP | Performed by: PHYSICAL THERAPIST

## 2021-08-27 ENCOUNTER — HOSPITAL ENCOUNTER (OUTPATIENT)
Dept: PHYSICAL THERAPY | Facility: CLINIC | Age: 41
Setting detail: THERAPIES SERIES
End: 2021-08-27
Attending: PHYSICIAN ASSISTANT
Payer: COMMERCIAL

## 2021-08-27 PROCEDURE — 97140 MANUAL THERAPY 1/> REGIONS: CPT | Mod: GP | Performed by: PHYSICAL THERAPIST

## 2021-08-27 PROCEDURE — 97110 THERAPEUTIC EXERCISES: CPT | Mod: GP | Performed by: PHYSICAL THERAPIST

## 2021-08-28 ENCOUNTER — TELEPHONE (OUTPATIENT)
Dept: PHYSICAL THERAPY | Facility: CLINIC | Age: 41
End: 2021-08-28

## 2021-08-28 DIAGNOSIS — M25.551 HIP PAIN, RIGHT: Primary | ICD-10-CM

## 2021-08-31 ENCOUNTER — MYC MEDICAL ADVICE (OUTPATIENT)
Dept: FAMILY MEDICINE | Facility: CLINIC | Age: 41
End: 2021-08-31

## 2021-09-08 ENCOUNTER — VIRTUAL VISIT (OUTPATIENT)
Dept: FAMILY MEDICINE | Facility: CLINIC | Age: 41
End: 2021-09-08
Payer: COMMERCIAL

## 2021-09-08 DIAGNOSIS — M54.2 CHRONIC NECK PAIN: ICD-10-CM

## 2021-09-08 DIAGNOSIS — G89.29 CHRONIC NECK PAIN: ICD-10-CM

## 2021-09-08 DIAGNOSIS — N62 MACROMASTIA: Primary | ICD-10-CM

## 2021-09-08 PROCEDURE — 99213 OFFICE O/P EST LOW 20 MIN: CPT | Mod: GT | Performed by: NURSE PRACTITIONER

## 2021-09-08 NOTE — PROGRESS NOTES
Pattie is a 40 year old who is being evaluated via a billable video visit.      How would you like to obtain your AVS? MyChart  If the video visit is dropped, the invitation should be resent by: Text to cell phone: .  Will anyone else be joining your video visit? No      Video Start Time: 5:41 PM    Assessment & Plan     Macromastia  Chronic history of large breasts causing significant upper neck and back pain. Notes indenting of bra straps on shoulders. Has done physical therapy for neck pain in the distant past. Tends to get rashes under breasts. Interested in pursuing breast reduction. Referral placed.   - Plastic Surgery Referral; Future    Chronic neck pain  Chronic without any particular trauma or injury. Likely secondary to above. Recommend pursue breast reductions.   - Plastic Surgery Referral; Future           See Patient Instructions    Return in about 6 months (around 3/8/2022) for Recheck as needed.    ALFREDO Yip CNP  [facility]    Subjective   Pattie is a 40 year old who presents for the following health issues     HPI   Review 7/30/2021 MR cervical spine  IMPRESSION:    1. Mild degenerative change.  2. No high-grade stenoses.  3. 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     Concern - large breasts  Onset: chronic  Description: large breasts are causing upper back and neck pain, and rashes under the breasts.  Intensity: moderate  Progression of Symptoms:  worsening  Accompanying Signs & Symptoms: 0  Previous history of similar problem: N/A  Precipitating factors:        Worsened by:   Alleviating factors:        Improved by: 0  Therapies tried and outcome: Chiropractor, CBD oil, Robaxin (uses sparingly because doesn't like to take medication)     Has had intermittent, chronic neck pain  Bra strap digs into top of shoulders, currently in size 36 DD  Gets  intermittent rashes underneath breasts  Has also done physical therapy for neck approximately 5 years ago   Would like referral for breast reduction surgery        Review of Systems   Constitutional, HEENT, cardiovascular, pulmonary, gi and gu systems are negative, except as otherwise noted.      Objective           Vitals:  No vitals were obtained today due to virtual visit.    Physical Exam   GENERAL: Healthy, alert and no distress  EYES: Eyes grossly normal to inspection.  No discharge or erythema, or obvious scleral/conjunctival abnormalities.  RESP: No audible wheeze, cough, or visible cyanosis.  No visible retractions or increased work of breathing.    SKIN: Visible skin clear. No significant rash, abnormal pigmentation or lesions.  NEURO: Cranial nerves grossly intact.  Mentation and speech appropriate for age.  PSYCH: Mentation appears normal, affect normal/bright, judgement and insight intact, normal speech and appearance well-groomed.    Diagnostic Test Results:  none            Video-Visit Details    Type of service:  Video Visit    Video End Time:5:51 PM    Originating Location (pt. Location): Home    Distant Location (provider location):  [facility]     Platform used for Video Visit: [employer]     Chart documentation with Dragon Voice recognition Software. Although reviewed after completion, some words and grammatical errors may remain.

## 2021-09-12 NOTE — PATIENT INSTRUCTIONS
Macromastia  Chronic history of large breasts causing significant upper neck and back pain. Notes indenting of bra straps on shoulders. Has done physical therapy for neck pain in the distant past. Tends to get rashes under breasts. Interested in pursuing breast reduction. Referral placed.   - Plastic Surgery Referral; Future    Chronic neck pain  Chronic without any particular trauma or injury. Likely secondary to above. Recommend pursue breast reductions.   - Plastic Surgery Referral; Future

## 2021-09-30 NOTE — PROGRESS NOTES
"[facility]  Section of General Neurology  New Patient  Virtual Visit      Pattie Easton MRN# [id number]   Age: 40 year old YOB: 1980     Requesting physician: Breanna Chacko     Reason for Consultation: neck pain, tension headache.        History of Presenting Symptoms:   Pattie Easton is a 40 year old female who presents today for evaluation of neck pain, tension headache.  She had a herniated disk at L4-5 and is s/p previous diskectomy.    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  Recently got a new microscope which has helped some.    She lives in [place]    She has seen a chiropractor who thinks it is the occipital nerve.    Headache specific questions:  Location (unilateral/whole head/etc): bilateral occipital /neck  Frequency--constant  Duration: has been going on for a couple of years  Provoking factors--started with \"popping neck\" a few years ago, turning head to right makes it worse  Chiropractor makes it better for a 1/2 day  Visual changes--rarely sees \"kalidoscope migraines\"  Nausea/vomiting:  At times  Sensitivity to light/sound: slightly  Liquid intake: drinks 120 oz of water during week days  Sleep (including MOHIT screen)--no known issues but wakes up stiff, tried various pillows and such.  Occipital nerve pain?--yes  Family history of headaches--none  Mood/Stress--normal   Caffeine--occasional   Medication overuse screen--doesn't take any more. Doesn't like pills.    Previous medications tried:  Prophylactic:  Abortive:   Headache days/month  Headache related changes in life  Headache related disability (days work missed, etc)  Previous imaging    She has 3 kids which is a stressor  She is working with PT in this regard.  She is considering breast reduction to help treat this.          Past Medical History:   There is no problem list on file for this patient.    Past Medical History: "   Diagnosis Date     NO ACTIVE PROBLEMS         Past Surgical History:     Past Surgical History:   Procedure Laterality Date     CYSTECTOMY PILONIDAL       HYSTERECTOMY, PAP NO LONGER INDICATED  2018        Social History:     Social History     Tobacco Use     Smoking status: Current Every Day Smoker     Packs/day: 0.10     Years: 14.00     Pack years: 1.40     Types: Cigarettes     Smokeless tobacco: Never Used     Tobacco comment: 2-3 cig/day   Substance Use Topics     Alcohol use: No     Comment: ETOH before she knew she was pregnant     Drug use: No        Family History:     Family History   Problem Relation Age of Onset     Alcohol/Drug Father      Hypertension Maternal Grandmother      Alcohol/Drug Sister         Medications:     Current Outpatient Medications   Medication Sig     HEMP OIL OR EXTRACT OR OTHER CBD CANNABINOID, NOT MEDICAL CANNABIS,      MELATONIN PO      methocarbamol (ROBAXIN) 500 MG tablet Take 1 tablet (500 mg) by mouth 4 times daily as needed for muscle spasms (Patient not taking: Reported on 9/8/2021)     VITAMIN D, CHOLECALCIFEROL, PO Take by mouth daily     No current facility-administered medications for this visit.        Allergies:   No Known Allergies     Review of Systems:   As noted above     Physical Exam:   General: Seated comfortably in no acute distress.  HEENT: Neck with normal range of motion as can be assessed.   Skin: No rashes  Neurologic:     Mental Status: Fully alert, attentive and oriented. Speech clear and fluent, no paraphasic errors.     Cranial Nerves:  Facial movements symmetric. Hearing not formally tested but intact to conversation.  No dysarthria.     Motor: No tremors or other abnormal movements observed.      Sensory:Not able to be tested virtually     Coordination: Not tested            Data: Pertinent prior to visit   Imaging:  MRI C spine 7/30/21                                                                IMPRESSION:    1. Mild degenerative  change.  2. No high-grade stenoses.  3. 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                Assessment and Plan:   Assessment:  Pattie Easton is a 40 year old female who presents today for evaluation of neck pain, tension headache.  It think her head and neck pain is multifactorial.  There are elements of tension headache, possible bilateral occipital neuralgia and migrainous features to some of her headaches as well.  I think these different subtypes and reasons to have head and neck pain are feeding into each other and making things worse.  I don't think her chiropractic care is helping a great deal and I cautioned her regarding arterial dissections I have seen from neck manipulation previously.       Plan:  --I find TCAs helpful in tension and migraine type headaches--start nortriptiline 25 mg at bedtime, discussed side effects  --Occipital nerve block via pain clinic always a consideration going forward, might be a piece of the puzzle  --Neck PT for exercises/stretching, can try hot/cold alternating/etc--Reviewed c spine MRI, looks good/no clear cause of pain seen, good news.  --Recommended to try a dark quiet room for bad headaches +OTC tylenol or aleve no more than 3 days a week but as desired to help as well.    --She has an appointment to consider breast reduction. It could potentially help her neck pain and headaches as well but I will leave this decision up to her.    --3 month f/u in person, no warning signs where further head imaging needed at this time.  She can call or mychart if things change or with questions in the interim.             Varinder Canseco MD   of Neurology   [facility]      The total time of this encounter today amounted to 32 minutes of time on video visit and 51 minutes in total. This time included time spent with the  patient, prep work, ordering tests, and performing post visit documentation.

## 2021-10-02 ENCOUNTER — HEALTH MAINTENANCE LETTER (OUTPATIENT)
Age: 41
End: 2021-10-02

## 2021-10-04 ENCOUNTER — VIRTUAL VISIT (OUTPATIENT)
Dept: NEUROLOGY | Facility: CLINIC | Age: 41
End: 2021-10-04
Attending: PHYSICIAN ASSISTANT
Payer: COMMERCIAL

## 2021-10-04 DIAGNOSIS — G44.209 TENSION HEADACHE: ICD-10-CM

## 2021-10-04 DIAGNOSIS — M54.81 BILATERAL OCCIPITAL NEURALGIA: Primary | ICD-10-CM

## 2021-10-04 DIAGNOSIS — M54.2 NECK PAIN: ICD-10-CM

## 2021-10-04 PROCEDURE — 99204 OFFICE O/P NEW MOD 45 MIN: CPT | Mod: GT | Performed by: STUDENT IN AN ORGANIZED HEALTH CARE EDUCATION/TRAINING PROGRAM

## 2021-10-04 RX ORDER — NORTRIPTYLINE HCL 25 MG
25 CAPSULE ORAL AT BEDTIME
Qty: 30 CAPSULE | Refills: 5 | Status: SHIPPED | OUTPATIENT
Start: 2021-10-04 | End: 2022-01-14

## 2021-10-04 NOTE — PROGRESS NOTES
Pattie is a 40 year old who is being evaluated via a billable video visit.      How would you like to obtain your AVS? MyChart  If the video visit is dropped, the invitation should be resent by: Text to cell phone: [phone]  Will anyone else be joining your video visit? No

## 2021-10-04 NOTE — LETTER
"    10/4/2021         RE: Pattie Easton  [address] [facility] [place], [place] [address]        Dear Colleague,    Thank you for referring your patient, Pattie Easton, to the [facility] [place]. Please see a copy of my visit note below.    [facility]  Section of General Neurology  New Patient  Virtual Visit      Pattie Easton MRN# [id number]   Age: 40 year old YOB: 1980     Requesting physician: Breanna Chacko     Reason for Consultation: neck pain, tension headache.        History of Presenting Symptoms:   Pattie Easton is a 40 year old female who presents today for evaluation of neck pain, tension headache.  She had a herniated disk at L4-5 and is s/p previous diskectomy.    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  Recently got a new microscope which has helped some.    She lives in [facility]    She has seen a chiropractor who thinks it is the occipital nerve.    Headache specific questions:  Location (unilateral/whole head/etc): bilateral occipital /neck  Frequency--constant  Duration: has been going on for a couple of years  Provoking factors--started with \"popping neck\" a few years ago, turning head to right makes it worse  Chiropractor makes it better for a 1/2 day  Visual changes--rarely sees \"kalidoscope migraines\"  Nausea/vomiting:  At times  Sensitivity to light/sound: slightly  Liquid intake: drinks 120 oz of water during week days  Sleep (including MOHIT screen)--no known issues but wakes up stiff, tried various pillows and such.  Occipital nerve pain?--yes  Family history of headaches--none  Mood/Stress--normal   Caffeine--occasional   Medication overuse screen--doesn't take any more. Doesn't like pills.    Previous medications tried:  Prophylactic:  Abortive:   Headache days/month  Headache related changes in life  Headache related disability (days work missed, etc)  Previous " imaging    She has 3 kids which is a stressor  She is working with PT in this regard.  She is considering breast reduction to help treat this.          Past Medical History:   There is no problem list on file for this patient.    Past Medical History:   Diagnosis Date     NO ACTIVE PROBLEMS         Past Surgical History:     Past Surgical History:   Procedure Laterality Date     CYSTECTOMY PILONIDAL       HYSTERECTOMY, PAP NO LONGER INDICATED  2018        Social History:     Social History     Tobacco Use     Smoking status: Current Every Day Smoker     Packs/day: 0.10     Years: 14.00     Pack years: 1.40     Types: Cigarettes     Smokeless tobacco: Never Used     Tobacco comment: 2-3 cig/day   Substance Use Topics     Alcohol use: No     Comment: ETOH before she knew she was pregnant     Drug use: No        Family History:     Family History   Problem Relation Age of Onset     Alcohol/Drug Father      Hypertension Maternal Grandmother      Alcohol/Drug Sister         Medications:     Current Outpatient Medications   Medication Sig     HEMP OIL OR EXTRACT OR OTHER CBD CANNABINOID, NOT MEDICAL CANNABIS,      MELATONIN PO      methocarbamol (ROBAXIN) 500 MG tablet Take 1 tablet (500 mg) by mouth 4 times daily as needed for muscle spasms (Patient not taking: Reported on 9/8/2021)     VITAMIN D, CHOLECALCIFEROL, PO Take by mouth daily     No current facility-administered medications for this visit.        Allergies:   No Known Allergies     Review of Systems:   As noted above     Physical Exam:   General: Seated comfortably in no acute distress.  HEENT: Neck with normal range of motion as can be assessed.   Skin: No rashes  Neurologic:     Mental Status: Fully alert, attentive and oriented. Speech clear and fluent, no paraphasic errors.     Cranial Nerves:  Facial movements symmetric. Hearing not formally tested but intact to conversation.  No dysarthria.     Motor: No tremors or other abnormal movements observed.       Sensory:Not able to be tested virtually     Coordination: Not tested            Data: Pertinent prior to visit   Imaging:  MRI C spine 7/30/21                                                                IMPRESSION:    1. Mild degenerative change.  2. No high-grade stenoses.  3. 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                Assessment and Plan:   Assessment:  Pattie Easton is a 40 year old female who presents today for evaluation of neck pain, tension headache.  It think her head and neck pain is multifactorial.  There are elements of tension headache, possible bilateral occipital neuralgia and migrainous features to some of her headaches as well.  I think these different subtypes and reasons to have head and neck pain are feeding into each other and making things worse.  I don't think her chiropractic care is helping a great deal and I cautioned her regarding arterial dissections I have seen from neck manipulation previously.       Plan:  --I find TCAs helpful in tension and migraine type headaches--start nortriptiline 25 mg at bedtime, discussed side effects  --Occipital nerve block via pain clinic always a consideration going forward, might be a piece of the puzzle  --Neck PT for exercises/stretching, can try hot/cold alternating/etc--Reviewed c spine MRI, looks good/no clear cause of pain seen, good news.  --Recommended to try a dark quiet room for bad headaches +OTC tylenol or aleve no more than 3 days a week but as desired to help as well.    --She has an appointment to consider breast reduction. It could potentially help her neck pain and headaches as well but I will leave this decision up to her.    --3 month f/u in person, no warning signs where further head imaging needed at this time.  She can call or mychart if things change or with questions in the interim.             Varinder  MD Harleen   of Neurology   [facility]      The total time of this encounter today amounted to 32 minutes of time on video visit and 51 minutes in total. This time included time spent with the patient, prep work, ordering tests, and performing post visit documentation.      Pattie is a 40 year old who is being evaluated via a billable video visit.      How would you like to obtain your AVS? MyChart  If the video visit is dropped, the invitation should be resent by: Text to cell phone: [phone]  Will anyone else be joining your video visit? No              Again, thank you for allowing me to participate in the care of your patient.        Sincerely,        Rommel Canseco MD

## 2021-10-08 ENCOUNTER — HOSPITAL ENCOUNTER (OUTPATIENT)
Dept: PHYSICAL THERAPY | Facility: CLINIC | Age: 41
Setting detail: THERAPIES SERIES
End: 2021-10-08
Attending: STUDENT IN AN ORGANIZED HEALTH CARE EDUCATION/TRAINING PROGRAM
Payer: COMMERCIAL

## 2021-10-08 PROCEDURE — 97110 THERAPEUTIC EXERCISES: CPT | Mod: GP | Performed by: PHYSICAL THERAPIST

## 2021-10-08 PROCEDURE — 97161 PT EVAL LOW COMPLEX 20 MIN: CPT | Mod: GP | Performed by: PHYSICAL THERAPIST

## 2021-10-08 PROCEDURE — 97140 MANUAL THERAPY 1/> REGIONS: CPT | Mod: GP | Performed by: PHYSICAL THERAPIST
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
## 2021-10-22 ENCOUNTER — HOSPITAL ENCOUNTER (OUTPATIENT)
Dept: PHYSICAL THERAPY | Facility: CLINIC | Age: 41
Setting detail: THERAPIES SERIES
End: 2021-10-22
Attending: STUDENT IN AN ORGANIZED HEALTH CARE EDUCATION/TRAINING PROGRAM
Payer: COMMERCIAL

## 2021-10-22 PROCEDURE — 97110 THERAPEUTIC EXERCISES: CPT | Mod: GP | Performed by: PHYSICAL THERAPIST

## 2021-10-22 PROCEDURE — 97140 MANUAL THERAPY 1/> REGIONS: CPT | Mod: GP | Performed by: PHYSICAL THERAPIST

## 2021-10-22 NOTE — PROGRESS NOTES
"*This note serves as the Discharge Note as pt did not return and this is last known status.      Physical Therapy Progress Note    Patient Name: Pattie Easton  MR#: [id number]  : 1980  MD name: Jonathan Jorge PA-C  Pt seen from: 21 to 10/22/21  Diagnosis: Pelvic Floor Muscle Dysfunction           10/22/21 0900   Signing Clinician's Name / Credentials   Signing clinician's name / credentials Rosalia Gudino, PT MA #[id number]   Session Number   Session Number 4/ ()   Authorization status 365   Progress Note/Recertification   Progress Note Due Date 21   Progress Note Completed Date 10/22/21   Ortho Goal 1   Goal Identifier STG   Goal Description 1) Pt will report 50% reduction in leaking with cougn, sneeze or laugh, in 4 weeks.    Goal Progress Met: pt has had only 1 leaking episode since beginning PT.    Target Date 21   Date Met 21   Ortho Goal 2   Goal Identifier STG   Goal Description 2)Pt will report no postvoid dribbling in 4 weeks.    Goal Progress Met: pt states no real leaking \"except maybe once\" since last session.   Target Date 21   Date Met 10/22/21   Ortho Goal 3   Goal Identifier LTG   Goal Description 3)Pt will report no leaking with cough, sneeze or laugh in 8 weeks.    Target Date 21   Goal Progress Not Met: still had \"about 1 leak in the last month.\"   (Recheck in 2 weeks. )   Ortho Goal 4   Goal Identifier LTG   Goal Description 4)Pt will be indep in HEP to prevent return of symptoms in 8 weeks.    Target Date 21   Goal Progress Ongoing and updated each session.    Ortho Goal 5   Goal Identifier New Goal   Goal Description 6)Pt will report 2/10 or less LBP with ADLs in 4 weeks.    Target Date 21   Subjective Report   Subjective Report Pt states only had one leaking episode since last PT session.  Pain overall has been \"really pretty good.\"     Objective Measure 1   Objective Measure Scar Mobility   Details Decreased and tender distal " "aspect of lumbar incisional scar.  Generalized decreased fascial mobility in (B) lumbar spine.    Objective Measure 2   Objective Measure Pain   Details 3/10 in central LB. 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  Went away after 3 days.    Objective Measure 3   Objective Measure Leaking   Details Only one episode of leaking since last session   Objective Measure 4   Objective Measure Pelvic Alignment   Details (L) ilium posteriorly rotated; mild transverse torsion noted with (R) ASIS more prominent   Therapeutic Procedure/exercise   Therapeutic Procedures: strength, endurance, ROM, flexibillity minutes (79795) 15   Skilled Intervention Exer: stretching; progression of HEP   Patient Response Pt able to do all HEP correctly.    Treatment Detail Reviewed use of SI belt, and engagement of core with increased activity or painful activity.  Completed Side Glide stretching at wall (B) - more for comparison of what pt cannot do as well on (R).  REviewed pirifromis stretch in standing as this is what pt does at work.  Instructed in Kneeling HF stretch.    Manual Therapy   Manual Therapy: Mobilization, MFR, MLD, friction massage minutes (04664) 40   Skilled Intervention MT: METs, mobs, MFR   Patient Response Neutral pelvic position achieved. Improved scar and fascial mobility in lumbar region.    Treatment Detail Supine METS to correct ilium rotation.  Shotgun technique to correct transverse torsion.  Symph Pub METS to set the corrected pelvic position.  Prone for full lumbar MFR using direct and indirect stacking methods.  Scar mobility completed with focus at distal aspect of tenderness and most adherence.    Plan   Homework [id number]   Home program Added kneeling HF stretch to PTRx program.    Plan for next session See pt in one week.  Cont with BF training if still getting leaking symptoms.  Cont with MT prn for mobility and decreasing pain and tension.  Advance HEP to meet set " goals.  Plan to see pt weekly for up to 4 more sessions.    Total Session Time   Timed Code Treatment Minutes 55 (TE,3MT)   Total Treatment Time (sum of timed and untimed services) 55 (TE,3MT)   Thank you for the referral of this patient.  Rosalia Gudino, PT, MA  #[id number]

## 2021-10-26 ENCOUNTER — TELEPHONE (OUTPATIENT)
Dept: SURGERY | Facility: CLINIC | Age: 41
End: 2021-10-26

## 2021-10-26 NOTE — TELEPHONE ENCOUNTER
PREVISIT INFORMATION                                                    Pattie Easton scheduled for future visit at [facility] specialty clinics.    Patient is scheduled to see  on 10/29/21  Reason for visit: Consult breast reduction   Referring provider Abdulkadir Monk and  Neurology   Has patient seen previous specialist? no No  Medical Records:  Available in chart.  Patient was previously seen at a [facility] or [facility] facility.    REVIEW                                                      New patient packet mailed to patient: No  Medication reconciliation complete: No      Current Outpatient Medications   Medication Sig Dispense Refill     HEMP OIL OR EXTRACT OR OTHER CBD CANNABINOID, NOT MEDICAL CANNABIS,        MELATONIN PO        methocarbamol (ROBAXIN) 500 MG tablet Take 1 tablet (500 mg) by mouth 4 times daily as needed for muscle spasms (Patient not taking: Reported on 9/8/2021) 90 tablet 0     nortriptyline (PAMELOR) 25 MG capsule Take 1 capsule (25 mg) by mouth At Bedtime 30 capsule 5     VITAMIN D, CHOLECALCIFEROL, PO Take by mouth daily         Allergies: Patient has no known allergies.        PLAN/FOLLOW-UP NEEDED                                                      Previsit review complete.  Patient will see provider at future scheduled appointment.     Patient Reminders Given:  Please, make sure you bring an updated list of your medications.   If you are having a procedure, please, present 15 minutes early.  If you need to cancel or reschedule,please call [phone].    Caitlin Mendosa RN

## 2021-10-29 ENCOUNTER — TELEPHONE (OUTPATIENT)
Dept: SURGERY | Facility: CLINIC | Age: 41
End: 2021-10-29

## 2021-10-29 ENCOUNTER — OFFICE VISIT (OUTPATIENT)
Dept: SURGERY | Facility: CLINIC | Age: 41
End: 2021-10-29
Payer: COMMERCIAL

## 2021-10-29 VITALS — WEIGHT: 180 LBS | BODY MASS INDEX: 27.28 KG/M2 | HEIGHT: 68 IN

## 2021-10-29 DIAGNOSIS — G89.29 CHRONIC NECK PAIN: ICD-10-CM

## 2021-10-29 DIAGNOSIS — M54.2 CHRONIC NECK PAIN: ICD-10-CM

## 2021-10-29 DIAGNOSIS — N62 MACROMASTIA: Primary | ICD-10-CM

## 2021-10-29 DIAGNOSIS — N62 HYPERTROPHY OF BREAST: Primary | ICD-10-CM

## 2021-10-29 DIAGNOSIS — N62 MACROMASTIA: ICD-10-CM

## 2021-10-29 PROCEDURE — 99203 OFFICE O/P NEW LOW 30 MIN: CPT | Performed by: PLASTIC SURGERY

## 2021-10-29 RX ORDER — CEFAZOLIN SODIUM 2 G/50ML
2 SOLUTION INTRAVENOUS SEE ADMIN INSTRUCTIONS
Status: CANCELLED | OUTPATIENT
Start: 2021-10-29

## 2021-10-29 RX ORDER — CEFAZOLIN SODIUM 2 G/50ML
2 SOLUTION INTRAVENOUS
Status: CANCELLED | OUTPATIENT
Start: 2021-10-29

## 2021-10-29 ASSESSMENT — MIFFLIN-ST. JEOR: SCORE: 1537.03

## 2021-10-29 NOTE — PROGRESS NOTES
REFERRING PROVIDER:  PARK Stiles.    PRESENTING COMPLAINT:  Consultation for breast reduction.    HISTORY OF PRESENTING COMPLAINT:  Ms. Easton is 40 years old.  She is here to discuss breast reduction surgery.  She has a lot of upper back, neck, shoulder pain, shoulder grooving from bra straps, inframammary fold rashes during summers.  She has used all sorts of supportive garments, over-the-counter pain medication and physical therapy for multiple years without continued relief.  She wears a DDD bra.  She would like to be around a C cup.  She has no personal history of breast cancer.  No family history of breast cancer.  Never had a mammogram.    PAST MEDICAL HISTORY:  Nil.    PAST SURGICAL HISTORY:  Hysterectomy and L4-L5 diskectomy.    MEDICATIONS:  Nortriptyline.    ALLERGIES:  Nil.    SOCIAL HISTORY:  Does not smoke, socially drinks.  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    REVIEW OF SYSTEMS:  Denies chest pain, shortness of breath, MI, CVA, DVT and PE.    PHYSICAL EXAMINATION:  Vital signs are stable.  She is afebrile, in no obvious distress.  She is 5 feet 7-1/2 inches, 180 pounds, BMI 28 kg/m2, body surface area 1.93 kg/m2.  On examination on her breasts, she has grade 3 ptosis, left side is a little bit longer and larger than the right.  Sternal notch to nipple distance under 40 cm.    ASSESSMENT AND PLAN:  Based on above findings, a diagnosis of symptomatic bilateral breast hypertrophy was made.  I had a joyce discussion with the patient about breast reduction, explained to her what it would entail, showed her where the scars would be, explained to her what to expect.  Expectations of scarring that are permanent, nipple sensory loss and change, asymmetry of the breasts were all explained.  Risks of pain, infection, bleeding, scarring, asymmetry, seromas, hematomas, wound breakdown, wound dehiscence, skin necrosis, fat necrosis, nipple necrosis, T junction site necrosis,  DVT, PE, MI, CVA, pneumonia, renal failure and death were all explained.  She understood them all and wants to proceed.  She will need a mammogram.  She has [employer] as her insurance.  No prior auth is required.  We will schedule her as soon as possible.  All questions were answered.  All exam and discussion done in the presence of a nurse and consent obtained and photographs obtained.    Total time spent with chart review, visit itself and post-visit paperwork was 30 minutes.    MELANIE Boland MD    cc:  PARK Stiles  [address]  [address]

## 2021-10-29 NOTE — TELEPHONE ENCOUNTER
Future mammogram orders placed. Reminder message sent to pt via "[employer]..JUN Allen RN, MD  P [facility]; Caitlin Mendosa RN; Katelynn Gutierrez LPN; Lara Hillman All,     She has [employer] for insurance.     So, I placed orders     Adriana, orders in     C/J, needs mammogram prior to surgery     Thanks     Ana Lilia

## 2021-10-29 NOTE — NURSING NOTE
Pattie Easton's goals for this visit include:   Chief Complaint   Patient presents with     Consult       She requests these members of her care team be copied on today's visit information:     PCP: Breanna Camilo    Referring Provider:  ALFREDO Brooks CNP  [address] [place], [place] [id number]    There were no vitals taken for this visit.    Do you need any medication refills at today's visit? Peggy Ivy on 10/29/2021 at 1:43 PM

## 2021-10-29 NOTE — LETTER
10/29/2021         RE: Pattie Easton  [id number] [facility] [address]        Dear Colleague,    Thank you for referring your patient, Pattie Easton, to the [facility]. Please see a copy of my visit note below.    REFERRING PROVIDER:  PARK Stiles.    PRESENTING COMPLAINT:  Consultation for breast reduction.    HISTORY OF PRESENTING COMPLAINT:  Ms. Easton is 40 years old.  She is here to discuss breast reduction surgery.  She has a lot of upper back, neck, shoulder pain, shoulder grooving from bra straps, inframammary fold rashes during summers.  She has used all sorts of supportive garments, over-the-counter pain medication and physical therapy for multiple years without continued relief.  She wears a DDD bra.  She would like to be around a C cup.  She has no personal history of breast cancer.  No family history of breast cancer.  Never had a mammogram.    PAST MEDICAL HISTORY:  Nil.    PAST SURGICAL HISTORY:  Hysterectomy and L4-L5 diskectomy.    MEDICATIONS:  Nortriptyline.    ALLERGIES:  Nil.    SOCIAL HISTORY:  Does not smoke, socially drinks.  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    REVIEW OF SYSTEMS:  Denies chest pain, shortness of breath, MI, CVA, DVT and PE.    PHYSICAL EXAMINATION:  Vital signs are stable.  She is afebrile, in no obvious distress.  She is 5 feet 7-1/2 inches, 180 pounds, BMI 28 kg/m2, body surface area 1.93 kg/m2.  On examination on her breasts, she has grade 3 ptosis, left side is a little bit longer and larger than the right.  Sternal notch to nipple distance under 40 cm.    ASSESSMENT AND PLAN:  Based on above findings, a diagnosis of symptomatic bilateral breast hypertrophy was made.  I had a joyce discussion with the patient about breast reduction, explained to her what it would entail, showed her where the scars would be, explained to her what to expect.  Expectations of scarring that are permanent, nipple  sensory loss and change, asymmetry of the breasts were all explained.  Risks of pain, infection, bleeding, scarring, asymmetry, seromas, hematomas, wound breakdown, wound dehiscence, skin necrosis, fat necrosis, nipple necrosis, T junction site necrosis, DVT, PE, MI, CVA, pneumonia, renal failure and death were all explained.  She understood them all and wants to proceed.  She will need a mammogram.  She has [employer] as her insurance.  No prior auth is required.  We will schedule her as soon as possible.  All questions were answered.  All exam and discussion done in the presence of a nurse and consent obtained and photographs obtained.    Total time spent with chart review, visit itself and post-visit paperwork was 30 minutes.    MELANIE Boland MD    cc:  PARK Stiles  [address]  [address]            Again, thank you for allowing me to participate in the care of your patient.        Sincerely,        JUN Boland MD

## 2021-10-29 NOTE — NURSING NOTE
Per Dr. Boland bilateral breast pictures to be taken. Pictures taken facing forward, 45 and 90 degree angle on both sides.              Raquel Ivy on 10/29/2021 at 2:25 PM

## 2021-11-01 ENCOUNTER — TELEPHONE (OUTPATIENT)
Dept: SURGERY | Facility: CLINIC | Age: 41
End: 2021-11-01
Payer: COMMERCIAL

## 2021-11-01 NOTE — TELEPHONE ENCOUNTER
RN called pt and notified pt that Mammogram orders were placed and that she can call the Imaging scheduling dept to have this scheduled. Pt states that she did call to schedule and was told that the soonest she can have the Mammo done is end of December. Pt states that her insurance is changing as of  or 2022 and she is not sure if she can even have this procedure done. RN sent a staff message to IFEANYI Reyes and the surgery scheduler with this information. RN also spoke with Imaging scheduler who states that the [facility] location is booking out until December but other clinics may have sooner openings. Openings available in [facility] as early as this Wednesday 11/3 Imaging scheduler advised. RN notified pt of this  and pt states she will call to schedule at this location and will wait to see if she can proceed with surgery this month as she found out her benefits  at the end of November. Will send a staff message to  with this update...Caitlin Mendosa RN

## 2021-11-01 NOTE — TELEPHONE ENCOUNTER
[facility] Call Center    Phone Message    May a detailed message be left on voicemail: yes     Reason for Call:Marlon called wanting to discuss if she needs a mammogram prior to surgery because they are scheduling out to December. Patient wants to know when Dr. Boland's first available surgery date is. Patient gets new insurance in December and was hoping to already have had the procedure. Please advise. Thank you.     Action Taken: Message routed to:  Adult Clinics: Surgery, General p [id number]    Travel Screening: Not Applicable

## 2021-11-02 DIAGNOSIS — Z11.59 ENCOUNTER FOR SCREENING FOR OTHER VIRAL DISEASES: ICD-10-CM

## 2021-11-02 PROBLEM — N62 HYPERTROPHY OF BREAST: Status: ACTIVE | Noted: 2021-11-02

## 2021-11-02 NOTE — TELEPHONE ENCOUNTER
Date Scheduled: 11-5-21  Facility: [facility]  Surgeon: Dr. Boland   Post-op appointment scheduled:   Next 5 appointments (look out 90 days)    Nov 03, 2021 12:45 PM  Pre-procedure Covid with [place] COVID LAB  [facility] ([facility] ) [address] [place] [id number]  [phone]   Jan 13, 2022  4:30 PM  Return Visit with Rommel Canseco MD  [facility] [place] ([facility]) [address] [place] [id number] [phone]         scheduled?: No  Surgery packet/instructions confirmed received?  No  Special Considerations:   Adriana Hillman, Surgery Scheduling Coordinator

## 2021-11-02 NOTE — TELEPHONE ENCOUNTER
JenniNovarra message sent with the pre-surgery instructions and RN's contact information.RN noted per chart pt is scheduled for a pre-op on 11/3/21 .Caitlin Hillman, JUN Booker MD; Caitlin Mendosa, LESLEY; Kasandra Wilkerson; RIOS [facility] [employer] [facility]; Katelynn Gutierrez LPN  I have the patient scheduled for surgery this Friday for surgery.     Adriana Burger             Previous Messages       ----- Message -----   From: JUN Boland MD   Sent: 2021   4:03 PM CDT   To: Caitlin Mendosa RN, Lara Hillman, *   Subject: RE: Update                                       Yes if Adriana can get her on     Tao Sung   ----- Message -----   From: Caitlin Mendosa RN   Sent: 2021   3:57 PM CDT   To: JUN Boland MD, *   Subject: RE: Update                                       Update: Pt is able to have her Mammogram  scheduled at the [facility] this week.  Pt also found out that her insurance does   so she is wondering if its still possible to have her surgery done this month?

## 2021-11-03 ENCOUNTER — LAB (OUTPATIENT)
Dept: LAB | Facility: CLINIC | Age: 41
End: 2021-11-03
Payer: COMMERCIAL

## 2021-11-03 ENCOUNTER — OFFICE VISIT (OUTPATIENT)
Dept: FAMILY MEDICINE | Facility: CLINIC | Age: 41
End: 2021-11-03
Payer: COMMERCIAL

## 2021-11-03 ENCOUNTER — HOSPITAL ENCOUNTER (OUTPATIENT)
Dept: MAMMOGRAPHY | Facility: CLINIC | Age: 41
Discharge: HOME | End: 2021-11-03
Attending: PLASTIC SURGERY | Admitting: PLASTIC SURGERY
Payer: COMMERCIAL

## 2021-11-03 VITALS
HEIGHT: 68 IN | DIASTOLIC BLOOD PRESSURE: 70 MMHG | BODY MASS INDEX: 27.28 KG/M2 | SYSTOLIC BLOOD PRESSURE: 120 MMHG | WEIGHT: 180 LBS | HEART RATE: 70 BPM | RESPIRATION RATE: 18 BRPM | TEMPERATURE: 98.7 F

## 2021-11-03 DIAGNOSIS — Z01.818 PREOP GENERAL PHYSICAL EXAM: Primary | ICD-10-CM

## 2021-11-03 DIAGNOSIS — Z12.31 VISIT FOR SCREENING MAMMOGRAM: ICD-10-CM

## 2021-11-03 DIAGNOSIS — G89.29 CHRONIC NECK PAIN: ICD-10-CM

## 2021-11-03 DIAGNOSIS — Z11.59 ENCOUNTER FOR SCREENING FOR OTHER VIRAL DISEASES: ICD-10-CM

## 2021-11-03 DIAGNOSIS — N62 MACROMASTIA: ICD-10-CM

## 2021-11-03 DIAGNOSIS — M54.2 CHRONIC NECK PAIN: ICD-10-CM

## 2021-11-03 PROCEDURE — 99214 OFFICE O/P EST MOD 30 MIN: CPT | Performed by: FAMILY MEDICINE

## 2021-11-03 PROCEDURE — 77063 BREAST TOMOSYNTHESIS BI: CPT

## 2021-11-03 PROCEDURE — U0005 INFEC AGEN DETEC AMPLI PROBE: HCPCS

## 2021-11-03 PROCEDURE — U0003 INFECTIOUS AGENT DETECTION BY NUCLEIC ACID (DNA OR RNA); SEVERE ACUTE RESPIRATORY SYNDROME CORONAVIRUS 2 (SARS-COV-2) (CORONAVIRUS DISEASE [COVID-19]), AMPLIFIED PROBE TECHNIQUE, MAKING USE OF HIGH THROUGHPUT TECHNOLOGIES AS DESCRIBED BY CMS-2020-01-R: HCPCS

## 2021-11-03 ASSESSMENT — MIFFLIN-ST. JEOR: SCORE: 1537.03

## 2021-11-03 NOTE — PROGRESS NOTES
[facility]  [address] [place] [address]  Phone: [phone]  Fax: [phone]  Primary Provider: Breanna Camilo  Pre-op Performing Provider: LUZ BARNHART      PREOPERATIVE EVALUATION:  Today's date: 11/3/2021    Pattie Easton is a 40 year old female who presents for a preoperative evaluation.    Surgical Information:  Surgery/Procedure: MAMMOPLASTY, REDUCTION, BILATERAL  Surgery Location: [facility]  Surgeon: Krystian   Surgery Date: 11/5/21  Time of Surgery: 10:05 am  Where patient plans to recover: At home with family  Fax number for surgical facility: Note does not need to be faxed, will be available electronically in Epic.    Type of Anesthesia Anticipated: Combined General with Block    Assessment & Plan     The proposed surgical procedure is considered LOW risk.      ICD-10-CM    1. Preop general physical exam  Z01.818    2. Macromastia  N62    3. Chronic neck pain  M54.2     G89.29        Risks and Recommendations:  The patient has the following additional risks and recommendations for perioperative complications:   - No identified additional risk factors other than previously addressed    Medication Instructions:  Patient is to take all scheduled medications on the day of surgery    RECOMMENDATION:  APPROVAL GIVEN to proceed with proposed procedure, without further diagnostic evaluation.      Subjective   HPI related to upcoming procedure:   40-year-old female presents for a preop physical exam.  Patient is scheduled to have mammoplasty, reduction, bilateral on Friday.  She requires evaluation and anesthesia risk assessment prior to undergoing surgery/procedure.  No fever, chills, sore throat, cough, shortness of breath, chest, palpitation, diarrhea, constipation or other relevant systemic symptoms.    Preop Questions 11/3/2021   1. Have you ever had a heart attack or stroke? No   2. Have you ever had surgery on your heart or blood vessels, such as a  stent placement, a coronary artery bypass, or surgery on an artery in your head, neck, heart, or legs? No   3. Do you have chest pain with activity? No   4. Do you have a history of  heart failure? No   5. Do you currently have a cold, bronchitis or symptoms of other infection? No   6. Do you have a cough, shortness of breath, or wheezing? No   7. Do you or anyone in your family have previous history of blood clots? No   8. Do you or does anyone in your family have a serious bleeding problem such as prolonged bleeding following surgeries or cuts? No   9. Have you ever had problems with anemia or been told to take iron pills? No   10. Have you had any abnormal blood loss such as black, tarry or bloody stools, or abnormal vaginal bleeding? No   11. Have you ever had a blood transfusion? No   12. Are you willing to have a blood transfusion if it is medically needed before, during, or after your surgery? Yes   13. Have you or any of your relatives ever had problems with anesthesia? No   14. Do you have sleep apnea, excessive snoring or daytime drowsiness? No   15. Do you have any artifical heart valves or other implanted medical devices like a pacemaker, defibrillator, or continuous glucose monitor? No   16. Do you have artificial joints? No   17. Are you allergic to latex? No   18. Is there any chance that you may be pregnant? No       Health Care Directive:  Patient does not have a Health Care Directive or Living Will: Discussed advance care planning with patient; information given to patient to review.    Preoperative Review of :   reviewed - controlled substances reflected in medication list.      Review of Systems  Constitutional, neuro, ENT, endocrine, pulmonary, cardiac, gastrointestinal, genitourinary, musculoskeletal, integument and psychiatric systems are negative, except as otherwise noted.    Patient Active Problem List    Diagnosis Date Noted     Hypertrophy of breast 11/02/2021     Priority: Medium  "    Added automatically from request for surgery [id number]        Past Medical History:   Diagnosis Date     NO ACTIVE PROBLEMS      Past Surgical History:   Procedure Laterality Date     CYSTECTOMY PILONIDAL       HYSTERECTOMY, PAP NO LONGER INDICATED  2018     Current Outpatient Medications   Medication Sig Dispense Refill     MELATONIN PO        nortriptyline (PAMELOR) 25 MG capsule Take 1 capsule (25 mg) by mouth At Bedtime 30 capsule 5     VITAMIN D, CHOLECALCIFEROL, PO Take by mouth daily       HEMP OIL OR EXTRACT OR OTHER CBD CANNABINOID, NOT MEDICAL CANNABIS,  (Patient not taking: Reported on 10/29/2021)       methocarbamol (ROBAXIN) 500 MG tablet Take 1 tablet (500 mg) by mouth 4 times daily as needed for muscle spasms (Patient not taking: Reported on 9/8/2021) 90 tablet 0       No Known Allergies     Social History     Tobacco Use     Smoking status: Current Every Day Smoker     Packs/day: 0.10     Years: 14.00     Pack years: 1.40     Types: Cigarettes     Smokeless tobacco: Former User     Quit date: 10/26/2018     Tobacco comment: 2-3 cig/day   Substance Use Topics     Alcohol use: No     Comment: ETOH before she knew she was pregnant     Family History   Problem Relation Age of Onset     Alcohol/Drug Father      Hypertension Maternal Grandmother      Alcohol/Drug Sister      History   Drug Use No         Objective     /70 (Cuff Size: Adult Regular)   Pulse 70   Temp 98.7  F (37.1  C) (Tympanic)   Resp 18   Ht 1.715 m (5' 7.5\")   Wt 81.6 kg (180 lb)   LMP  (LMP Unknown)   Breastfeeding No   BMI 27.78 kg/m      Physical Exam    GENERAL APPEARANCE: healthy, alert and no distress     EYES: EOMI, PERRL     HENT: ear canals and TM's normal and nose and mouth without ulcers or lesions     NECK: no adenopathy, no asymmetry, masses, or scars and thyroid normal to palpation     RESP: lungs clear to auscultation - no rales, rhonchi or wheezes     CV: regular rates and rhythm, normal S1 S2, no S3 or S4 " and no murmur, click or rub     ABDOMEN:  soft, nontender, no HSM or masses and bowel sounds normal     MS: extremities normal- no gross deformities noted, no evidence of inflammation in joints, FROM in all extremities.     SKIN: no suspicious lesions or rashes     NEURO: Normal strength and tone, sensory exam grossly normal, mentation intact and speech normal     PSYCH: mentation appears normal. and affect normal/bright     LYMPHATICS: No cervical adenopathy    Recent Labs   Lab Test 03/22/21  1711   HGB 12.3         POTASSIUM 3.8   CR 0.61        Diagnostics:  No labs were ordered during this visit.   No EKG required for low risk surgery (cataract, skin procedure, breast biopsy, etc).    Revised Cardiac Risk Index (RCRI):  The patient has the following serious cardiovascular risks for perioperative complications:   - No serious cardiac risks = 0 points     RCRI Interpretation: 0 points: Class I (very low risk - 0.4% complication rate)           Signed Electronically by: Luis Angel Arnold MD  Copy of this evaluation report is provided to requesting physician.

## 2021-11-03 NOTE — H&P (VIEW-ONLY)
[facility]  [address] [place] [id number]  Phone: [phone]  Fax: [phone]  Primary Provider: Breanna Camilo  Pre-op Performing Provider: LUZ BARNHART      PREOPERATIVE EVALUATION:  Today's date: 11/3/2021    Pattie Easton is a 40 year old female who presents for a preoperative evaluation.    Surgical Information:  Surgery/Procedure: MAMMOPLASTY, REDUCTION, BILATERAL  Surgery Location: [facility]  Surgeon: Krystian   Surgery Date: 11/5/21  Time of Surgery: 10:05 am  Where patient plans to recover: At home with family  Fax number for surgical facility: Note does not need to be faxed, will be available electronically in Epic.    Type of Anesthesia Anticipated: Combined General with Block    Assessment & Plan     The proposed surgical procedure is considered LOW risk.      ICD-10-CM    1. Preop general physical exam  Z01.818    2. Macromastia  N62    3. Chronic neck pain  M54.2     G89.29        Risks and Recommendations:  The patient has the following additional risks and recommendations for perioperative complications:   - No identified additional risk factors other than previously addressed    Medication Instructions:  Patient is to take all scheduled medications on the day of surgery    RECOMMENDATION:  APPROVAL GIVEN to proceed with proposed procedure, without further diagnostic evaluation.      Subjective   HPI related to upcoming procedure:   40-year-old female presents for a preop physical exam.  Patient is scheduled to have mammoplasty, reduction, bilateral on Friday.  She requires evaluation and anesthesia risk assessment prior to undergoing surgery/procedure.  No fever, chills, sore throat, cough, shortness of breath, chest, palpitation, diarrhea, constipation or other relevant systemic symptoms.    Preop Questions 11/3/2021   1. Have you ever had a heart attack or stroke? No   2. Have you ever had surgery on your heart or blood vessels, such as a  stent placement, a coronary artery bypass, or surgery on an artery in your head, neck, heart, or legs? No   3. Do you have chest pain with activity? No   4. Do you have a history of  heart failure? No   5. Do you currently have a cold, bronchitis or symptoms of other infection? No   6. Do you have a cough, shortness of breath, or wheezing? No   7. Do you or anyone in your family have previous history of blood clots? No   8. Do you or does anyone in your family have a serious bleeding problem such as prolonged bleeding following surgeries or cuts? No   9. Have you ever had problems with anemia or been told to take iron pills? No   10. Have you had any abnormal blood loss such as black, tarry or bloody stools, or abnormal vaginal bleeding? No   11. Have you ever had a blood transfusion? No   12. Are you willing to have a blood transfusion if it is medically needed before, during, or after your surgery? Yes   13. Have you or any of your relatives ever had problems with anesthesia? No   14. Do you have sleep apnea, excessive snoring or daytime drowsiness? No   15. Do you have any artifical heart valves or other implanted medical devices like a pacemaker, defibrillator, or continuous glucose monitor? No   16. Do you have artificial joints? No   17. Are you allergic to latex? No   18. Is there any chance that you may be pregnant? No       Health Care Directive:  Patient does not have a Health Care Directive or Living Will: Discussed advance care planning with patient; information given to patient to review.    Preoperative Review of :   reviewed - controlled substances reflected in medication list.      Review of Systems  Constitutional, neuro, ENT, endocrine, pulmonary, cardiac, gastrointestinal, genitourinary, musculoskeletal, integument and psychiatric systems are negative, except as otherwise noted.    Patient Active Problem List    Diagnosis Date Noted     Hypertrophy of breast 11/02/2021     Priority: Medium  "    Added automatically from request for surgery [id number]        Past Medical History:   Diagnosis Date     NO ACTIVE PROBLEMS      Past Surgical History:   Procedure Laterality Date     CYSTECTOMY PILONIDAL       HYSTERECTOMY, PAP NO LONGER INDICATED  2018     Current Outpatient Medications   Medication Sig Dispense Refill     MELATONIN PO        nortriptyline (PAMELOR) 25 MG capsule Take 1 capsule (25 mg) by mouth At Bedtime 30 capsule 5     VITAMIN D, CHOLECALCIFEROL, PO Take by mouth daily       HEMP OIL OR EXTRACT OR OTHER CBD CANNABINOID, NOT MEDICAL CANNABIS,  (Patient not taking: Reported on 10/29/2021)       methocarbamol (ROBAXIN) 500 MG tablet Take 1 tablet (500 mg) by mouth 4 times daily as needed for muscle spasms (Patient not taking: Reported on 9/8/2021) 90 tablet 0       No Known Allergies     Social History     Tobacco Use     Smoking status: Current Every Day Smoker     Packs/day: 0.10     Years: 14.00     Pack years: 1.40     Types: Cigarettes     Smokeless tobacco: Former User     Quit date: 10/26/2018     Tobacco comment: 2-3 cig/day   Substance Use Topics     Alcohol use: No     Comment: ETOH before she knew she was pregnant     Family History   Problem Relation Age of Onset     Alcohol/Drug Father      Hypertension Maternal Grandmother      Alcohol/Drug Sister      History   Drug Use No         Objective     /70 (Cuff Size: Adult Regular)   Pulse 70   Temp 98.7  F (37.1  C) (Tympanic)   Resp 18   Ht 1.715 m (5' 7.5\")   Wt 81.6 kg (180 lb)   LMP  (LMP Unknown)   Breastfeeding No   BMI 27.78 kg/m      Physical Exam    GENERAL APPEARANCE: healthy, alert and no distress     EYES: EOMI, PERRL     HENT: ear canals and TM's normal and nose and mouth without ulcers or lesions     NECK: no adenopathy, no asymmetry, masses, or scars and thyroid normal to palpation     RESP: lungs clear to auscultation - no rales, rhonchi or wheezes     CV: regular rates and rhythm, normal S1 S2, no S3 or S4 " and no murmur, click or rub     ABDOMEN:  soft, nontender, no HSM or masses and bowel sounds normal     MS: extremities normal- no gross deformities noted, no evidence of inflammation in joints, FROM in all extremities.     SKIN: no suspicious lesions or rashes     NEURO: Normal strength and tone, sensory exam grossly normal, mentation intact and speech normal     PSYCH: mentation appears normal. and affect normal/bright     LYMPHATICS: No cervical adenopathy    Recent Labs   Lab Test 03/22/21  1711   HGB 12.3         POTASSIUM 3.8   CR 0.61        Diagnostics:  No labs were ordered during this visit.   No EKG required for low risk surgery (cataract, skin procedure, breast biopsy, etc).    Revised Cardiac Risk Index (RCRI):  The patient has the following serious cardiovascular risks for perioperative complications:   - No serious cardiac risks = 0 points     RCRI Interpretation: 0 points: Class I (very low risk - 0.4% complication rate)           Signed Electronically by: Luis Angel Arnold MD  Copy of this evaluation report is provided to requesting physician.

## 2021-11-03 NOTE — NURSING NOTE
"Chief Complaint   Patient presents with     Pre-Op Exam     /70 (Cuff Size: Adult Regular)   Pulse 70   Temp 98.7  F (37.1  C) (Tympanic)   Resp 18   Ht 1.715 m (5' 7.5\")   Wt 81.6 kg (180 lb)   LMP  (LMP Unknown)   Breastfeeding No   BMI 27.78 kg/m   Estimated body mass index is 27.78 kg/m  as calculated from the following:    Height as of this encounter: 1.715 m (5' 7.5\").    Weight as of this encounter: 81.6 kg (180 lb).  Patient presents to the clinic using No DME      Health Maintenance that is potentially due pending provider review:    Health Maintenance Due   Topic Date Due     ANNUAL REVIEW OF HM ORDERS  Never done     COVID-19 Vaccine (1) Never done     HEPATITIS C SCREENING  Never done     INFLUENZA VACCINE (1) 09/01/2021                "

## 2021-11-04 ENCOUNTER — TELEPHONE (OUTPATIENT)
Dept: SURGERY | Facility: CLINIC | Age: 41
End: 2021-11-04

## 2021-11-04 LAB — SARS-COV-2 RNA RESP QL NAA+PROBE: NEGATIVE

## 2021-11-04 NOTE — TELEPHONE ENCOUNTER
I spoke with pt and pt asks how much time she will need off of work. Pt scheduled for Mammo reduction on 11/5/21.  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 RN reviewed with pt restrictions which include no lifting >5lbs and no strenuous activity for 4 weeks. Pt states that she will have her work fax over Short term disability forms. Direct department fax number provided to pt....Caitlin Mendosa RN

## 2021-11-04 NOTE — TELEPHONE ENCOUNTER
[facility] Call Center    Phone Message    May a detailed message be left on voicemail: yes     Reason for Call: Patient called wanting to discuss recovery time. Please advise. Thank you.    Action Taken: Message routed to:  Adult Clinics: Surgery, General p [id number]    Travel Screening: Not Applicable

## 2021-11-05 ENCOUNTER — ANESTHESIA (OUTPATIENT)
Dept: SURGERY | Facility: AMBULATORY SURGERY CENTER | Age: 41
End: 2021-11-05
Payer: COMMERCIAL

## 2021-11-05 ENCOUNTER — HOSPITAL ENCOUNTER (OUTPATIENT)
Facility: AMBULATORY SURGERY CENTER | Age: 41
End: 2021-11-05
Attending: PLASTIC SURGERY | Admitting: PLASTIC SURGERY
Payer: COMMERCIAL

## 2021-11-05 ENCOUNTER — DOCUMENTATION ONLY (OUTPATIENT)
Dept: SURGERY | Facility: CLINIC | Age: 41
End: 2021-11-05

## 2021-11-05 ENCOUNTER — ANESTHESIA EVENT (OUTPATIENT)
Dept: SURGERY | Facility: AMBULATORY SURGERY CENTER | Age: 41
End: 2021-11-05
Payer: COMMERCIAL

## 2021-11-05 VITALS
TEMPERATURE: 96.9 F | SYSTOLIC BLOOD PRESSURE: 119 MMHG | OXYGEN SATURATION: 100 % | DIASTOLIC BLOOD PRESSURE: 77 MMHG | RESPIRATION RATE: 14 BRPM | HEART RATE: 61 BPM

## 2021-11-05 DIAGNOSIS — N62 HYPERTROPHY OF BREAST: ICD-10-CM

## 2021-11-05 PROCEDURE — 19318 BREAST REDUCTION: CPT | Mod: 50 | Performed by: PLASTIC SURGERY

## 2021-11-05 PROCEDURE — G8907 PT DOC NO EVENTS ON DISCHARG: HCPCS

## 2021-11-05 PROCEDURE — 88305 TISSUE EXAM BY PATHOLOGIST: CPT | Performed by: PATHOLOGY

## 2021-11-05 PROCEDURE — 19318 BREAST REDUCTION: CPT | Mod: 50

## 2021-11-05 PROCEDURE — G8916 PT W IV AB GIVEN ON TIME: HCPCS

## 2021-11-05 RX ORDER — LIDOCAINE 40 MG/G
CREAM TOPICAL
Status: DISCONTINUED | OUTPATIENT
Start: 2021-11-05 | End: 2021-11-06 | Stop reason: HOSPADM

## 2021-11-05 RX ORDER — FENTANYL CITRATE 50 UG/ML
25-50 INJECTION, SOLUTION INTRAMUSCULAR; INTRAVENOUS EVERY 5 MIN PRN
Status: DISCONTINUED | OUTPATIENT
Start: 2021-11-05 | End: 2021-11-06 | Stop reason: HOSPADM

## 2021-11-05 RX ORDER — DEXAMETHASONE SODIUM PHOSPHATE 4 MG/ML
INJECTION, SOLUTION INTRA-ARTICULAR; INTRALESIONAL; INTRAMUSCULAR; INTRAVENOUS; SOFT TISSUE PRN
Status: DISCONTINUED | OUTPATIENT
Start: 2021-11-05 | End: 2021-11-05

## 2021-11-05 RX ORDER — NALOXONE HYDROCHLORIDE 0.4 MG/ML
0.2 INJECTION, SOLUTION INTRAMUSCULAR; INTRAVENOUS; SUBCUTANEOUS
Status: DISCONTINUED | OUTPATIENT
Start: 2021-11-05 | End: 2021-11-06 | Stop reason: HOSPADM

## 2021-11-05 RX ORDER — KETAMINE HYDROCHLORIDE 10 MG/ML
INJECTION INTRAMUSCULAR; INTRAVENOUS PRN
Status: DISCONTINUED | OUTPATIENT
Start: 2021-11-05 | End: 2021-11-05

## 2021-11-05 RX ORDER — FENTANYL CITRATE 50 UG/ML
25-50 INJECTION, SOLUTION INTRAMUSCULAR; INTRAVENOUS
Status: DISCONTINUED | OUTPATIENT
Start: 2021-11-05 | End: 2021-11-06 | Stop reason: HOSPADM

## 2021-11-05 RX ORDER — MEPERIDINE HYDROCHLORIDE 25 MG/ML
12.5 INJECTION INTRAMUSCULAR; INTRAVENOUS; SUBCUTANEOUS
Status: DISCONTINUED | OUTPATIENT
Start: 2021-11-05 | End: 2021-11-06 | Stop reason: HOSPADM

## 2021-11-05 RX ORDER — NALOXONE HYDROCHLORIDE 0.4 MG/ML
0.4 INJECTION, SOLUTION INTRAMUSCULAR; INTRAVENOUS; SUBCUTANEOUS
Status: DISCONTINUED | OUTPATIENT
Start: 2021-11-05 | End: 2021-11-06 | Stop reason: HOSPADM

## 2021-11-05 RX ORDER — OXYCODONE HYDROCHLORIDE 5 MG/1
5-10 TABLET ORAL EVERY 6 HOURS PRN
Qty: 20 TABLET | Refills: 0 | Status: SHIPPED | OUTPATIENT
Start: 2021-11-05 | End: 2022-01-14

## 2021-11-05 RX ORDER — ONDANSETRON 4 MG/1
4 TABLET, ORALLY DISINTEGRATING ORAL EVERY 30 MIN PRN
Status: DISCONTINUED | OUTPATIENT
Start: 2021-11-05 | End: 2021-11-06 | Stop reason: HOSPADM

## 2021-11-05 RX ORDER — ACETAMINOPHEN 325 MG/1
975 TABLET ORAL ONCE
Status: COMPLETED | OUTPATIENT
Start: 2021-11-05 | End: 2021-11-05

## 2021-11-05 RX ORDER — SODIUM CHLORIDE, SODIUM LACTATE, POTASSIUM CHLORIDE, CALCIUM CHLORIDE 600; 310; 30; 20 MG/100ML; MG/100ML; MG/100ML; MG/100ML
INJECTION, SOLUTION INTRAVENOUS CONTINUOUS
Status: DISCONTINUED | OUTPATIENT
Start: 2021-11-05 | End: 2021-11-06 | Stop reason: HOSPADM

## 2021-11-05 RX ORDER — ALBUTEROL SULFATE 0.83 MG/ML
2.5 SOLUTION RESPIRATORY (INHALATION) EVERY 4 HOURS PRN
Status: DISCONTINUED | OUTPATIENT
Start: 2021-11-05 | End: 2021-11-06 | Stop reason: HOSPADM

## 2021-11-05 RX ORDER — CEFAZOLIN SODIUM 2 G/100ML
2 INJECTION, SOLUTION INTRAVENOUS
Status: COMPLETED | OUTPATIENT
Start: 2021-11-05 | End: 2021-11-05

## 2021-11-05 RX ORDER — CEFAZOLIN SODIUM 2 G/100ML
2 INJECTION, SOLUTION INTRAVENOUS SEE ADMIN INSTRUCTIONS
Status: DISCONTINUED | OUTPATIENT
Start: 2021-11-05 | End: 2021-11-06 | Stop reason: HOSPADM

## 2021-11-05 RX ORDER — FLUMAZENIL 0.1 MG/ML
0.2 INJECTION, SOLUTION INTRAVENOUS
Status: DISCONTINUED | OUTPATIENT
Start: 2021-11-05 | End: 2021-11-06 | Stop reason: HOSPADM

## 2021-11-05 RX ORDER — ONDANSETRON 2 MG/ML
4 INJECTION INTRAMUSCULAR; INTRAVENOUS EVERY 30 MIN PRN
Status: DISCONTINUED | OUTPATIENT
Start: 2021-11-05 | End: 2021-11-06 | Stop reason: HOSPADM

## 2021-11-05 RX ORDER — BUPIVACAINE HYDROCHLORIDE 2.5 MG/ML
INJECTION, SOLUTION EPIDURAL; INFILTRATION; INTRACAUDAL
Status: DISCONTINUED | OUTPATIENT
Start: 2021-11-05 | End: 2021-11-05

## 2021-11-05 RX ORDER — LABETALOL HYDROCHLORIDE 5 MG/ML
10 INJECTION, SOLUTION INTRAVENOUS
Status: DISCONTINUED | OUTPATIENT
Start: 2021-11-05 | End: 2021-11-06 | Stop reason: HOSPADM

## 2021-11-05 RX ORDER — ONDANSETRON 4 MG/1
4-8 TABLET, ORALLY DISINTEGRATING ORAL EVERY 8 HOURS PRN
Qty: 4 TABLET | Refills: 0 | Status: SHIPPED | OUTPATIENT
Start: 2021-11-05 | End: 2022-01-14

## 2021-11-05 RX ORDER — DIAZEPAM 10 MG/2ML
2.5 INJECTION, SOLUTION INTRAMUSCULAR; INTRAVENOUS
Status: DISCONTINUED | OUTPATIENT
Start: 2021-11-05 | End: 2021-11-06 | Stop reason: HOSPADM

## 2021-11-05 RX ORDER — AMOXICILLIN 250 MG
1-2 CAPSULE ORAL 2 TIMES DAILY
Qty: 30 TABLET | Refills: 0 | Status: SHIPPED | OUTPATIENT
Start: 2021-11-05 | End: 2022-01-14

## 2021-11-05 RX ORDER — LIDOCAINE HYDROCHLORIDE 20 MG/ML
INJECTION, SOLUTION INFILTRATION; PERINEURAL PRN
Status: DISCONTINUED | OUTPATIENT
Start: 2021-11-05 | End: 2021-11-05

## 2021-11-05 RX ORDER — ONDANSETRON 2 MG/ML
INJECTION INTRAMUSCULAR; INTRAVENOUS PRN
Status: DISCONTINUED | OUTPATIENT
Start: 2021-11-05 | End: 2021-11-05

## 2021-11-05 RX ORDER — OXYCODONE HYDROCHLORIDE 5 MG/1
5-10 TABLET ORAL EVERY 4 HOURS PRN
Status: DISCONTINUED | OUTPATIENT
Start: 2021-11-05 | End: 2021-11-06 | Stop reason: HOSPADM

## 2021-11-05 RX ORDER — PROPOFOL 10 MG/ML
INJECTION, EMULSION INTRAVENOUS PRN
Status: DISCONTINUED | OUTPATIENT
Start: 2021-11-05 | End: 2021-11-05

## 2021-11-05 RX ORDER — PROPOFOL 10 MG/ML
INJECTION, EMULSION INTRAVENOUS CONTINUOUS PRN
Status: DISCONTINUED | OUTPATIENT
Start: 2021-11-05 | End: 2021-11-05

## 2021-11-05 RX ADMIN — PROPOFOL 200 MG: 10 INJECTION, EMULSION INTRAVENOUS at 10:03

## 2021-11-05 RX ADMIN — FENTANYL CITRATE 50 MCG: 50 INJECTION, SOLUTION INTRAMUSCULAR; INTRAVENOUS at 09:40

## 2021-11-05 RX ADMIN — EPHEDRINE SULFATE 10 MG: 50 INJECTION, SOLUTION INTRAVENOUS at 10:51

## 2021-11-05 RX ADMIN — SODIUM CHLORIDE, SODIUM LACTATE, POTASSIUM CHLORIDE, CALCIUM CHLORIDE: 600; 310; 30; 20 INJECTION, SOLUTION INTRAVENOUS at 09:57

## 2021-11-05 RX ADMIN — EPHEDRINE SULFATE 5 MG: 50 INJECTION, SOLUTION INTRAVENOUS at 10:48

## 2021-11-05 RX ADMIN — KETAMINE HYDROCHLORIDE 20 MG: 10 INJECTION INTRAMUSCULAR; INTRAVENOUS at 10:06

## 2021-11-05 RX ADMIN — LIDOCAINE HYDROCHLORIDE 60 MG: 20 INJECTION, SOLUTION INFILTRATION; PERINEURAL at 10:03

## 2021-11-05 RX ADMIN — PROPOFOL 200 MCG/KG/MIN: 10 INJECTION, EMULSION INTRAVENOUS at 10:03

## 2021-11-05 RX ADMIN — SODIUM CHLORIDE, SODIUM LACTATE, POTASSIUM CHLORIDE, CALCIUM CHLORIDE: 600; 310; 30; 20 INJECTION, SOLUTION INTRAVENOUS at 11:22

## 2021-11-05 RX ADMIN — ACETAMINOPHEN 975 MG: 325 TABLET ORAL at 08:55

## 2021-11-05 RX ADMIN — OXYCODONE HYDROCHLORIDE 5 MG: 5 TABLET ORAL at 12:26

## 2021-11-05 RX ADMIN — BUPIVACAINE HYDROCHLORIDE 20 ML: 2.5 INJECTION, SOLUTION EPIDURAL; INFILTRATION; INTRACAUDAL at 09:50

## 2021-11-05 RX ADMIN — CEFAZOLIN SODIUM 2 G: 2 INJECTION, SOLUTION INTRAVENOUS at 09:57

## 2021-11-05 RX ADMIN — DEXAMETHASONE SODIUM PHOSPHATE 10 MG: 4 INJECTION, SOLUTION INTRA-ARTICULAR; INTRALESIONAL; INTRAMUSCULAR; INTRAVENOUS; SOFT TISSUE at 10:06

## 2021-11-05 RX ADMIN — FENTANYL CITRATE 25 MCG: 50 INJECTION, SOLUTION INTRAMUSCULAR; INTRAVENOUS at 11:36

## 2021-11-05 RX ADMIN — SODIUM CHLORIDE, SODIUM LACTATE, POTASSIUM CHLORIDE, CALCIUM CHLORIDE: 600; 310; 30; 20 INJECTION, SOLUTION INTRAVENOUS at 09:52

## 2021-11-05 RX ADMIN — ONDANSETRON 4 MG: 2 INJECTION INTRAMUSCULAR; INTRAVENOUS at 11:35

## 2021-11-05 ASSESSMENT — LIFESTYLE VARIABLES: TOBACCO_USE: 1

## 2021-11-05 NOTE — OP NOTE
PREOPERATIVE DIAGNOSIS: Symptomatic bilateral breast hypertrophy.     POSTOPERATIVE DIAGNOSIS: Symptomatic bilateral breast hypertrophy.     PROCEDURES: Bilateral superomedial pedicle inverted T skin closure breast reduction.     SURGEON: Ana Lilia Boland MD.     RESIDENT: Bridgette Myrick MD.    ANESTHESIA: General anesthesia with endotracheal intubation.     COMPLICATIONS: Nil.     DRAINS: Nil.     Blood Loss: 150 mL    SPECIMENS: Skin and breast tissue from right breast measuring about 661 g, left breast measuring about 831 g.     DESCRIPTION OF PROCEDURE: After informed consent was taken, the proper site and procedure was ascertained with the patient and was appropriately marked and taken to in the operating room.  She was placed in supine position with the knees comfortably flexed with pillows underneath them, and pneumoboots placed and running prior to induction of anesthesia. Preoperative antibiotics given in the OR. All pressure points were appropriately padded. General anesthesia was administered without any complications. She was placed in such a position that she could be flexed to about 50 degrees. Her arms were padded and abducted to about 50 degrees. She was prepped and draped in a standard surgical fashion. I began by first remarking the preop markings and marking a 42 mm areola on each side. I then marked out a superior medial pedicle on each side. I then de-epithelialized the pedicle on each side. I then dissected out the pedicle on each side without actually seeing the deep fascia and also released the pedicle such that it could rotate into its new nipple position without any tension. I then went ahead and on each side excised the inferomedial, inferior, inferolateral and lateral aspects of the breast according to a vertical pattern reduction. Strict hemostasis was ensured during this entire part of the case. Once this was done, I then temporarily closed the patient's breast in an inverted T fashion,  sat the patient up ensured symmetry and then marked out the new areolar opening symmetrically on each side. I then went ahead and closed the horizontal incision using 2-0 Monocryl suture in a deep dermal layer. Then I made sure that the nipple areolar complex could be retrieved without any tension, which is could on each side. I then checked that they were both pink and viable, which they were. I then went ahead and excised the skin of the new areolar opening and de-epithelialized epithelialized the portion that was involved in the pedicle on each side. I then sutured in the nipple areolar complex using 2-0 Monocryl suture in a deep dermal fashion circumferentially. I then closed the vertical incision using 2-0 Monocryl suture to approximate the medial and lateral pillars, and then the deep dermis. I then ran all the incisions with 3-0 Strattafix suture in a running intracuticular manner followed by placement of Prineo, and then followed by an ACE wrap. At the end of the case, the patient's breasts were soft, nipples were pink, and breasts were symmetric. The patient tolerated the procedure well. All counts correct at the end of the case. The patient was extubated and sent to recovery room in a stable condition.

## 2021-11-05 NOTE — ANESTHESIA CARE TRANSFER NOTE
Patient: Pattie Easton    Procedure: Procedure(s):  MAMMOPLASTY, REDUCTION, BILATERAL       Diagnosis: Hypertrophy of breast [N62]  Diagnosis Additional Information: No value filed.    Anesthesia Type:   No value filed.     Note:    Oropharynx: oropharynx clear of all foreign objects  Level of Consciousness: awake  Oxygen Supplementation: nasal cannula    Independent Airway: airway patency satisfactory and stable  Dentition: dentition unchanged  Vital Signs Stable: post-procedure vital signs reviewed and stable  Report to RN Given: handoff report given  Patient transferred to: PACU    Handoff Report: Identifed the Patient, Identified the Reponsible Provider, Reviewed the pertinent medical history, Discussed the surgical course, Reviewed Intra-OP anesthesia mangement and issues during anesthesia, Set expectations for post-procedure period and Allowed opportunity for questions and acknowledgement of understanding      Vitals:  Vitals Value Taken Time   /69 11/05/21 1200   Temp 96.9  F (36.1  C) 11/05/21 1155   Pulse 76 11/05/21 1201   Resp 11 11/05/21 1201   SpO2 100 % 11/05/21 1201   Vitals shown include unvalidated device data.    Electronically Signed By: ALFREDO Johnson CRNA  November 5, 2021  12:03 PM

## 2021-11-05 NOTE — BRIEF OP NOTE
[facility]    Brief Operative Note    Pre-operative diagnosis: Hypertrophy of breast [N62]  Post-operative diagnosis Same as pre-operative diagnosis    Procedure: Procedure(s):  MAMMOPLASTY, REDUCTION, BILATERAL  Surgeon: Surgeon(s) and Role:     * JUN Boland MD - Primary  Anesthesia: Combined General with Block   Estimated Blood Loss:100cc    Drains: None  Specimens:   ID Type Source Tests Collected by Time Destination   1 : left breast tissue Tissue Breast, Left SURGICAL PATHOLOGY EXAM JUN Boland MD 11/5/2021 10:28 AM    2 : right breast tissue Tissue Breast, Right SURGICAL PATHOLOGY EXAM JUN Boland MD 11/5/2021 10:29 AM      Findings:   None.  Complications: None.  Implants: * No implants in log *

## 2021-11-05 NOTE — ANESTHESIA POSTPROCEDURE EVALUATION
Patient: Pattie Easton    Procedure: Procedure(s):  MAMMOPLASTY, REDUCTION, BILATERAL       Diagnosis:Hypertrophy of breast [N62]  Diagnosis Additional Information: No value filed.    Anesthesia Type:  General    Note:  Disposition: Outpatient   Postop Pain Control: Uneventful            Sign Out: Well controlled pain   PONV: No   Neuro/Psych: Uneventful            Sign Out: Acceptable/Baseline neuro status   Airway/Respiratory: Uneventful            Sign Out: Acceptable/Baseline resp. status   CV/Hemodynamics: Uneventful            Sign Out: Acceptable CV status   Other NRE: NONE   DID A NON-ROUTINE EVENT OCCUR? No           Last vitals:  Vitals Value Taken Time   /40 11/05/21 1230   Temp 36.1  C (96.9  F) 11/05/21 1155   Pulse 84 11/05/21 1230   Resp 7 11/05/21 1230   SpO2 99 % 11/05/21 1230   Vitals shown include unvalidated device data.    Electronically Signed By: Phil Berrios MD  November 5, 2021  2:52 PM

## 2021-11-05 NOTE — OR NURSING
Pt had preop block.  Performed timeout.  Pt on O2 and continuous EKG monitor. VS continually monitored. Pt premedicated as per [facility] orders.  Pt tolerated the procedure.

## 2021-11-05 NOTE — ANESTHESIA PROCEDURE NOTES
Pectoralis Procedure Note    Pre-Procedure   Staff -        Anesthesiologist:  Phil Berrios MD       Performed By: anesthesiologist       Location: pre-op       Procedure Start/Stop Times: 11/5/2021 9:40 AM and 11/5/2021 9:50 AM       Pre-Anesthestic Checklist: patient identified, IV checked, site marked, risks and benefits discussed, informed consent, monitors and equipment checked, pre-op evaluation, at physician/surgeon's request and post-op pain management  Timeout:       Correct Patient: Yes        Correct Procedure: Yes        Correct Site: Yes        Correct Position: Yes        Correct Laterality: Yes        Site Marked: Yes  Procedure Documentation  Procedure: Pectoralis       Laterality: bilateral       Patient Position: sitting       Skin prep: Chloraprep       Needle Type: short bevel       Needle Gauge: 21.        Needle Length (millimeters): 100        Ultrasound guided       1. Ultrasound was used to identify targeted nerve, plexus, vascular marker, or fascial plane and place a needle adjacent to it in real-time.       2. Ultrasound was used to visualize the spread of anesthetic in close proximity to the above referenced structure.       3. A permanent image is entered into the patient's record.       4. The visualized anatomic structures appeared normal.       5. There were no apparent abnormal pathologic findings.    Assessment/Narrative         The placement was negative for: blood aspirated, painful injection and site bleeding       Paresthesias: No.     Bolus given via needle..        Secured via.        Insertion/Infusion Method: Single Shot       Complications: none       Injection made incrementally with aspirations every 5 mL.    Medication(s) Administered   Bupivacaine 0.25% PF (Infiltration), 20 mL  Bupivacaine liposome (Exparel) 1.3% LA inj susp (Infiltration), 20 mL  Medication Administration Time: 11/5/2021 9:50 AM

## 2021-11-05 NOTE — ANESTHESIA PREPROCEDURE EVALUATION
Anesthesia Pre-Procedure Evaluation    Patient: Ptatie Easton   MRN: [id number] : 1980        Preoperative Diagnosis: Hypertrophy of breast [N62]    Procedure : Procedure(s):  MAMMOPLASTY, REDUCTION, BILATERAL          Past Medical History:   Diagnosis Date     NO ACTIVE PROBLEMS       Past Surgical History:   Procedure Laterality Date     CYSTECTOMY PILONIDAL       HYSTERECTOMY, PAP NO LONGER INDICATED        No Known Allergies   Social History     Tobacco Use     Smoking status: Current Every Day Smoker     Packs/day: 0.10     Years: 14.00     Pack years: 1.40     Types: Cigarettes     Smokeless tobacco: Former User     Quit date: 10/26/2018     Tobacco comment: 2-3 cig/day   Substance Use Topics     Alcohol use: No     Comment: ETOH before she knew she was pregnant      Wt Readings from Last 1 Encounters:   21 81.6 kg (180 lb)        Anesthesia Evaluation            ROS/MED HX  ENT/Pulmonary:     (+) tobacco use, Current use,     Neurologic:       Cardiovascular:       METS/Exercise Tolerance:     Hematologic:       Musculoskeletal:       GI/Hepatic:       Renal/Genitourinary:       Endo:       Psychiatric/Substance Use:       Infectious Disease:       Malignancy:       Other:            Physical Exam    Airway  airway exam normal      Mallampati: II   TM distance: > 3 FB   Neck ROM: full   Mouth opening: > 3 cm    Respiratory Devices and Support         Dental  no notable dental history         Cardiovascular          Rhythm and rate: regular and normal     Pulmonary   pulmonary exam normal        breath sounds clear to auscultation           OUTSIDE LABS:  CBC:   Lab Results   Component Value Date    WBC 6.4 2021    WBC 8.6 2011    HGB 12.3 2021    HGB 12.4 2011    HCT 36.1 2021    HCT 37.0 2011     2021     2011     BMP:   Lab Results   Component Value Date     2021    POTASSIUM 3.8 2021    CHLORIDE 106  03/22/2021    CO2 29 03/22/2021    BUN 10 03/22/2021    CR 0.61 03/22/2021     (H) 03/22/2021     COAGS: No results found for: PTT, INR, FIBR  POC:   Lab Results   Component Value Date    HCG Positive (A) 06/22/2011     HEPATIC:   Lab Results   Component Value Date    ALBUMIN 3.8 03/22/2021    PROTTOTAL 7.0 03/22/2021    ALT 24 03/22/2021    AST 7 03/22/2021    ALKPHOS 58 03/22/2021    BILITOTAL 0.2 03/22/2021     OTHER:   Lab Results   Component Value Date    SUHA 8.9 03/22/2021    LIPASE 78 03/22/2021    TSH 1.42 03/22/2021       Anesthesia Plan    ASA Status:  2   NPO Status:  NPO Appropriate    Anesthesia Type: General.     - Airway: LMA   Induction: Intravenous.   Maintenance: Balanced.        Consents    Anesthesia Plan(s) and associated risks, benefits, and realistic alternatives discussed. Questions answered and patient/representative(s) expressed understanding.     - Discussed with:  Patient      - Extended Intubation/Ventilatory Support Discussed: No.      - Patient is DNR/DNI Status: No    Use of blood products discussed: No .     Postoperative Care    Pain management: IV analgesics, Oral pain medications, Peripheral nerve block (Single Shot), Multi-modal analgesia.   PONV prophylaxis: Ondansetron (or other 5HT-3), Dexamethasone or Solumedrol, Background Propofol Infusion     Comments:                Phil Berrios MD

## 2021-11-05 NOTE — PROGRESS NOTES
SHAILESH completed, signed and faxed to Rissa at [facility] [phone].    Katelynn Gutierrez LPN

## 2021-11-08 RX ORDER — EPHEDRINE SULFATE 50 MG/ML
INJECTION, SOLUTION INTRAVENOUS PRN
Status: DISCONTINUED | OUTPATIENT
Start: 2021-11-05 | End: 2021-11-08

## 2021-11-12 LAB
PATH REPORT.COMMENTS IMP SPEC: NORMAL
PATH REPORT.COMMENTS IMP SPEC: NORMAL
PATH REPORT.FINAL DX SPEC: NORMAL
PATH REPORT.GROSS SPEC: NORMAL
PATH REPORT.MICROSCOPIC SPEC OTHER STN: NORMAL
PATH REPORT.RELEVANT HX SPEC: NORMAL
PHOTO IMAGE: NORMAL

## 2021-11-19 ENCOUNTER — OFFICE VISIT (OUTPATIENT)
Dept: SURGERY | Facility: CLINIC | Age: 41
End: 2021-11-19
Payer: COMMERCIAL

## 2021-11-19 DIAGNOSIS — N62 HYPERTROPHY OF BREAST: Primary | ICD-10-CM

## 2021-11-19 PROCEDURE — 99024 POSTOP FOLLOW-UP VISIT: CPT | Performed by: PLASTIC SURGERY

## 2021-11-19 NOTE — NURSING NOTE
Pattie Easton's goals for this visit include:   Chief Complaint   Patient presents with     Surgical Followup     Breast reduction  2 week post op        She requests these members of her care team be copied on today's visit information: yes     PCP: Breanna Camilo    Referring Provider:  No referring provider defined for this encounter.    LMP  (LMP Unknown)     Do you need any medication refills at today's visit? No

## 2021-11-19 NOTE — NURSING NOTE
Per Dr. Boland bilateral breast pictures to be taken. Pictures taken facing forward, 45 and 90 degree angle on both sides.    Katelynn Gutierrez LPN

## 2021-11-19 NOTE — PROGRESS NOTES
PRESENTING COMPLAINT:  Postoperative visit status post bilateral breast reduction done on 11/05/2021.    HISTORY OF PRESENTING COMPLAINT:  Ms. Easton is 40 years old, a couple of weeks out from surgery, very happy with the results.  No major issues.    PHYSICAL EXAMINATION:  Vital signs stable.  She is afebrile, in no obvious distress.  Everything is healing in well.  Breasts are aesthetic and symmetric.    ASSESSMENT AND PLAN:  Based on the above findings, a diagnosis of bilateral breast reduction was made.  She is healing well.  Pathology was benign.  Advised aggressive moisturization and let everything settle over the next few weeks to 2 months.  I will see her back on a p.r.n. basis.  She should get to know her breasts well.  Continue with yearly mammograms.

## 2021-11-19 NOTE — LETTER
11/19/2021         RE: Pattie Easton  [id number]  [facility] [address]        Dear Colleague,    Thank you for referring your patient, Pattie Easton, to the [facility]. Please see a copy of my visit note below.    PRESENTING COMPLAINT:  Postoperative visit status post bilateral breast reduction done on 11/05/2021.    HISTORY OF PRESENTING COMPLAINT:  Ms. Easton is 40 years old, a couple of weeks out from surgery, very happy with the results.  No major issues.    PHYSICAL EXAMINATION:  Vital signs stable.  She is afebrile, in no obvious distress.  Everything is healing in well.  Breasts are aesthetic and symmetric.    ASSESSMENT AND PLAN:  Based on the above findings, a diagnosis of bilateral breast reduction was made.  She is healing well.  Pathology was benign.  Advised aggressive moisturization and let everything settle over the next few weeks to 2 months.  I will see her back on a p.r.n. basis.  She should get to know her breasts well.  Continue with yearly mammograms.          Again, thank you for allowing me to participate in the care of your patient.        Sincerely,        JUN Boland MD

## 2022-01-11 NOTE — PROGRESS NOTES
[facility]  Section of General Neurology  Return Patient Visit    Pattie Easton MRN# [id number]   Age: 41 year old YOB: 1980     Brief history of symptoms: The patient was initially seen in neurologic consultation on 10/4/21 for evaluation of neck pain and headache. Please see the comprehensive neurologic consultation note from that date in the Epic records for details.         Interval history:     She was taking 25 mg at bedtime of nortriptyline, but she hasn't taken taken it in a month and a half given some insurance issues and it really wasn't helping.  She also didn't feel like she could drink socially taking this medicine.  She feels like she needs to pop her neck when they occur.    She felt great after the surgery but this receded, possibly secondary to the block used for her surgery.   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    She has tried flexeril before, makes her  groggy.  Pain still starts bioccipitally and extends upward.            Past Medical History:     Patient Active Problem List   Diagnosis     Hypertrophy of breast     Past Medical History:   Diagnosis Date     NO ACTIVE PROBLEMS         Past Surgical History:     Past Surgical History:   Procedure Laterality Date     CYSTECTOMY PILONIDAL       HYSTERECTOMY, PAP NO LONGER INDICATED  2018     MAMMOPLASTY REDUCTION BILATERAL Bilateral 11/5/2021    Procedure: MAMMOPLASTY, REDUCTION, BILATERAL;  Surgeon: JUN Boland MD;  Location:  OR        Social History:     Social History     Tobacco Use     Smoking status: Current Every Day Smoker     Packs/day: 0.10     Years: 14.00     Pack years: 1.40     Types: Cigarettes     Smokeless tobacco: Former User     Quit date: 10/26/2018     Tobacco comment: 2-3 cig/day   Substance Use Topics     Alcohol use: No     Comment: ETOH before she knew she was pregnant     Drug  use: No        Family History:     Family History   Problem Relation Age of Onset     Alcohol/Drug Father      Hypertension Maternal Grandmother      Alcohol/Drug Sister         Medications:     Current Outpatient Medications   Medication Sig     HEMP OIL OR EXTRACT OR OTHER CBD CANNABINOID, NOT MEDICAL CANNABIS,  (Patient not taking: Reported on 10/29/2021)     MELATONIN PO      methocarbamol (ROBAXIN) 500 MG tablet Take 1 tablet (500 mg) by mouth 4 times daily as needed for muscle spasms (Patient not taking: Reported on 9/8/2021)     nortriptyline (PAMELOR) 25 MG capsule Take 1 capsule (25 mg) by mouth At Bedtime (Patient not taking: Reported on 11/19/2021)     ondansetron (ZOFRAN-ODT) 4 MG ODT tab Take 1-2 tablets (4-8 mg) by mouth every 8 hours as needed for nausea (Patient not taking: Reported on 11/19/2021)     oxyCODONE (ROXICODONE) 5 MG tablet Take 1-2 tablets (5-10 mg) by mouth every 6 hours as needed for moderate to severe pain     senna-docusate (SENOKOT-S/PERICOLACE) 8.6-50 MG tablet Take 1-2 tablets by mouth 2 times daily (Patient not taking: Reported on 11/19/2021)     VITAMIN D, CHOLECALCIFEROL, PO Take by mouth daily     No current facility-administered medications for this visit.        Allergies:   No Known Allergies       Physical Exam:   Vitals: /67 (BP Location: Right arm, Patient Position: Sitting, Cuff Size: Adult Regular)   Pulse 76   Wt 82.5 kg (181 lb 12.8 oz)   LMP  (LMP Unknown)   BMI 28.05 kg/m     CV: peripheral pulse appreciated  Lungs: breathing comfortably  Extremities: no edema    Neuro:   General Appearance: No apparent distress, well-nourished, well-groomed, pleasant     Mental Status: Alert and oriented to person, place, and time. Speech fluent and comprehension intact. No dysarthria. Normal memory, fund of knowledge, attention/concentration, and language    Fundoscopic Exam: Optic discs sharp without evidence of papilledema bilaterally    Cranial Nerves:   II: Visual  fields: normal  III: Pupils: 3 mm, equal, round, reactive to light   III,IV,VI: Extraocular Movements: intact   V: Facial sensation: intact to light touch  VII: Facial strength: intact without asymmetry  VIII: Hearing: intact grossly  IX: Palate: intact   XII: Tongue movement: normal     Motor Exam:   5/5 diffusely    Sensory: intact to light touch  Pressure on b/l occipital nerve does seem to recreate where pain manifests from.    Coordination: no dysmetria with finger-to-nose bilaterally    Reflexes: biceps, triceps, brachioradialis, patellar  2+ and symmetric.     Gait: normal casual gait, normal stride length         Data: Pertinent prior to visit   Imaging:    MRI CERVICAL SPINE WITHOUT CONTRAST 7/30/2021 8:52 AM      HISTORY: Neck pain, chronic; degenerative changes on x-ray. Neck pain.  Tension headache.     TECHNIQUE: Multiplanar, multisequence MRI of the cervical spine  without contrast.      COMPARISON: X-ray 6/28/2021.     FINDINGS:  Alignment is significant for slight straightening. Bone  marrow demonstrates an area of T2 hyperintense signal within the T3  vertebral body with areas of mixed T1 hyperintense and hypointense  signal. Similar findings within the superior T2 vertebral body. No  abnormal cord signal. No appreciable extraspinal abnormality.     Level by level as follows:     C2-C3:  Disc height maintained. No herniation. Mild bilateral facet  arthropathy. No foraminal or spinal canal stenosis.      C3-C4:  Disc height maintained. Minimal disc osteophyte complex. Mild  bilateral facet arthropathy. No foraminal or spinal canal stenosis.      C4-C5:  Disc height maintained. Minimal disc osteophyte complex with a  tiny central protrusion component. Mild bilateral facet arthropathy.  No foraminal or spinal canal stenosis.      C5-C6:  Mild disc height loss. Disc osteophyte complex with central  bulge/annular fissure. Mild bilateral facet arthropathy. Mild  bilateral foraminal stenosis. Mild spinal  canal stenosis.      C6-C7:  Disc height maintained. No herniation. Mild bilateral facet  arthropathy. No foraminal or spinal canal stenosis.     C7-T1: Disc height maintained. No herniation. Moderate right and mild  left facet arthropathy. No foraminal or spinal canal stenosis.                                                                      IMPRESSION:    1. Mild degenerative change.  2. No high-grade stenoses.  3. 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     I personally reviewed the above imaging and agree with the findings in the report.             Assessment and Plan:   Pattie Easton is a pleasant 41 year old female who presents today in follow up for neck pain, tension headache.  It think her head and neck pain is multifactorial.  There are elements of tension headache, possible bilateral occipital neuralgia and rarely migrainous features to some of her headaches as well.  I think these different subtypes and reasons to have head and neck pain are feeding into each other and making things worse.  Unfortunately a low dose of nortriptyline was not helpful and she did not like the potential side effect profile, discussed that in mixed headache types gabapentin is commonly effective and well tolerated.  I do think given that these improved post surgery transiently (breast reduction)/anesthetic block a consideration of trial of b/l occipital nerve block should be considered. She would like to try this.       Plan:  --Gabapentin up titrate to 300 mg TID instructions and script given, possible side effects discussed  --Occipital nerve block via pain clinic: Referral sent, she will be scheduled with Dr. Melody mariee in [place].  --Neck PT for exercises/stretching--was not abundantly helpful previously  --Recommended to try a dark quiet room for bad headaches +OTC tylenol or aleve no more than 3  days a week but as desired to help as well.  Can consider prescription abortive therapy if rare migrainous type headaches become more abundant in future.  --No red flag symptoms noted and exam within normal limits, MRI C spine reviewed and non contributory.    --3 month f/u in person or virtually  She can call or mychart if things change or with questions in the interim.                Varinder Canseco MD   of Neurology   [facility]           Varinder Canseco MD   of Neurology   [facility]      The total time of this encounter today amounted to 32 minutes. This time included time spent with the patient, prep work, ordering tests, and performing post visit documentation.

## 2022-01-13 ENCOUNTER — OFFICE VISIT (OUTPATIENT)
Dept: NEUROLOGY | Facility: CLINIC | Age: 42
End: 2022-01-13
Payer: COMMERCIAL

## 2022-01-13 VITALS
HEART RATE: 76 BPM | BODY MASS INDEX: 28.05 KG/M2 | WEIGHT: 181.8 LBS | SYSTOLIC BLOOD PRESSURE: 111 MMHG | DIASTOLIC BLOOD PRESSURE: 67 MMHG

## 2022-01-13 DIAGNOSIS — G44.209 TENSION HEADACHE: ICD-10-CM

## 2022-01-13 DIAGNOSIS — M54.2 NECK PAIN: ICD-10-CM

## 2022-01-13 DIAGNOSIS — M54.81 BILATERAL OCCIPITAL NEURALGIA: Primary | ICD-10-CM

## 2022-01-13 PROCEDURE — 99214 OFFICE O/P EST MOD 30 MIN: CPT | Performed by: STUDENT IN AN ORGANIZED HEALTH CARE EDUCATION/TRAINING PROGRAM

## 2022-01-13 RX ORDER — GABAPENTIN 300 MG/1
300 CAPSULE ORAL 3 TIMES DAILY
Qty: 90 CAPSULE | Refills: 4 | Status: SHIPPED | OUTPATIENT
Start: 2022-01-13 | End: 2022-04-12

## 2022-01-13 ASSESSMENT — PAIN SCALES - GENERAL: PAINLEVEL: MILD PAIN (3)

## 2022-01-13 NOTE — LETTER
1/13/2022         RE: Pattie Easton  [address] [facility] [address]        Dear Colleague,    Thank you for referring your patient, Pattie Easton, to the [facility] [place]. Please see a copy of my visit note below.    [facility]  Section of General Neurology  Return Patient Visit    Pattie Easton MRN# [id number]   Age: 41 year old YOB: 1980     Brief history of symptoms: The patient was initially seen in neurologic consultation on 10/4/21 for evaluation of neck pain and headache. Please see the comprehensive neurologic consultation note from that date in the Epic records for details.         Interval history:     She was taking 25 mg at bedtime of nortriptyline, but she hasn't taken taken it in a month and a half given some insurance issues and it really wasn't helping.  She also didn't feel like she could drink socially taking this medicine.  She feels like she needs to pop her neck when they occur.    She felt great after the surgery but this receded, possibly secondary to the block used for her surgery.   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    She has tried flexeril before, makes her  groggy.  Pain still starts bioccipitally and extends upward.            Past Medical History:     Patient Active Problem List   Diagnosis     Hypertrophy of breast     Past Medical History:   Diagnosis Date     NO ACTIVE PROBLEMS         Past Surgical History:     Past Surgical History:   Procedure Laterality Date     CYSTECTOMY PILONIDAL       HYSTERECTOMY, PAP NO LONGER INDICATED  2018     MAMMOPLASTY REDUCTION BILATERAL Bilateral 11/5/2021    Procedure: MAMMOPLASTY, REDUCTION, BILATERAL;  Surgeon: JUN Boland MD;  Location:  OR        Social History:     Social History     Tobacco Use     Smoking status: Current Every Day Smoker     Packs/day:  0.10     Years: 14.00     Pack years: 1.40     Types: Cigarettes     Smokeless tobacco: Former User     Quit date: 10/26/2018     Tobacco comment: 2-3 cig/day   Substance Use Topics     Alcohol use: No     Comment: ETOH before she knew she was pregnant     Drug use: No        Family History:     Family History   Problem Relation Age of Onset     Alcohol/Drug Father      Hypertension Maternal Grandmother      Alcohol/Drug Sister         Medications:     Current Outpatient Medications   Medication Sig     HEMP OIL OR EXTRACT OR OTHER CBD CANNABINOID, NOT MEDICAL CANNABIS,  (Patient not taking: Reported on 10/29/2021)     MELATONIN PO      methocarbamol (ROBAXIN) 500 MG tablet Take 1 tablet (500 mg) by mouth 4 times daily as needed for muscle spasms (Patient not taking: Reported on 9/8/2021)     nortriptyline (PAMELOR) 25 MG capsule Take 1 capsule (25 mg) by mouth At Bedtime (Patient not taking: Reported on 11/19/2021)     ondansetron (ZOFRAN-ODT) 4 MG ODT tab Take 1-2 tablets (4-8 mg) by mouth every 8 hours as needed for nausea (Patient not taking: Reported on 11/19/2021)     oxyCODONE (ROXICODONE) 5 MG tablet Take 1-2 tablets (5-10 mg) by mouth every 6 hours as needed for moderate to severe pain     senna-docusate (SENOKOT-S/PERICOLACE) 8.6-50 MG tablet Take 1-2 tablets by mouth 2 times daily (Patient not taking: Reported on 11/19/2021)     VITAMIN D, CHOLECALCIFEROL, PO Take by mouth daily     No current facility-administered medications for this visit.        Allergies:   No Known Allergies       Physical Exam:   Vitals: /67 (BP Location: Right arm, Patient Position: Sitting, Cuff Size: Adult Regular)   Pulse 76   Wt 82.5 kg (181 lb 12.8 oz)   LMP  (LMP Unknown)   BMI 28.05 kg/m     CV: peripheral pulse appreciated  Lungs: breathing comfortably  Extremities: no edema    Neuro:   General Appearance: No apparent distress, well-nourished, well-groomed, pleasant     Mental Status: Alert and oriented to  person, place, and time. Speech fluent and comprehension intact. No dysarthria. Normal memory, fund of knowledge, attention/concentration, and language    Fundoscopic Exam: Optic discs sharp without evidence of papilledema bilaterally    Cranial Nerves:   II: Visual fields: normal  III: Pupils: 3 mm, equal, round, reactive to light   III,IV,VI: Extraocular Movements: intact   V: Facial sensation: intact to light touch  VII: Facial strength: intact without asymmetry  VIII: Hearing: intact grossly  IX: Palate: intact   XII: Tongue movement: normal     Motor Exam:   5/5 diffusely    Sensory: intact to light touch  Pressure on b/l occipital nerve does seem to recreate where pain manifests from.    Coordination: no dysmetria with finger-to-nose bilaterally    Reflexes: biceps, triceps, brachioradialis, patellar  2+ and symmetric.     Gait: normal casual gait, normal stride length         Data: Pertinent prior to visit   Imaging:    MRI CERVICAL SPINE WITHOUT CONTRAST 7/30/2021 8:52 AM      HISTORY: Neck pain, chronic; degenerative changes on x-ray. Neck pain.  Tension headache.     TECHNIQUE: Multiplanar, multisequence MRI of the cervical spine  without contrast.      COMPARISON: X-ray 6/28/2021.     FINDINGS:  Alignment is significant for slight straightening. Bone  marrow demonstrates an area of T2 hyperintense signal within the T3  vertebral body with areas of mixed T1 hyperintense and hypointense  signal. Similar findings within the superior T2 vertebral body. No  abnormal cord signal. No appreciable extraspinal abnormality.     Level by level as follows:     C2-C3:  Disc height maintained. No herniation. Mild bilateral facet  arthropathy. No foraminal or spinal canal stenosis.      C3-C4:  Disc height maintained. Minimal disc osteophyte complex. Mild  bilateral facet arthropathy. No foraminal or spinal canal stenosis.      C4-C5:  Disc height maintained. Minimal disc osteophyte complex with a  tiny central  protrusion component. Mild bilateral facet arthropathy.  No foraminal or spinal canal stenosis.      C5-C6:  Mild disc height loss. Disc osteophyte complex with central  bulge/annular fissure. Mild bilateral facet arthropathy. Mild  bilateral foraminal stenosis. Mild spinal canal stenosis.      C6-C7:  Disc height maintained. No herniation. Mild bilateral facet  arthropathy. No foraminal or spinal canal stenosis.     C7-T1: Disc height maintained. No herniation. Moderate right and mild  left facet arthropathy. No foraminal or spinal canal stenosis.                                                                      IMPRESSION:    1. Mild degenerative change.  2. No high-grade stenoses.  3. 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     I personally reviewed the above imaging and agree with the findings in the report.             Assessment and Plan:   Pattie Easton is a pleasant 41 year old female who presents today in follow up for neck pain, tension headache.  It think her head and neck pain is multifactorial.  There are elements of tension headache, possible bilateral occipital neuralgia and rarely migrainous features to some of her headaches as well.  I think these different subtypes and reasons to have head and neck pain are feeding into each other and making things worse.  Unfortunately a low dose of nortriptyline was not helpful and she did not like the potential side effect profile, discussed that in mixed headache types gabapentin is commonly effective and well tolerated.  I do think given that these improved post surgery transiently (breast reduction)/anesthetic block a consideration of trial of b/l occipital nerve block should be considered. She would like to try this.       Plan:  --Gabapentin up titrate to 300 mg TID instructions and script given, possible side effects discussed  --Occipital  nerve block via pain clinic: Referral sent, she will be scheduled with Dr. Melody mariee in [place].  --Neck PT for exercises/stretching--was not abundantly helpful previously  --Recommended to try a dark quiet room for bad headaches +OTC tylenol or aleve no more than 3 days a week but as desired to help as well.  Can consider prescription abortive therapy if rare migrainous type headaches become more abundant in future.  --No red flag symptoms noted and exam within normal limits, MRI C spine reviewed and non contributory.    --3 month f/u in person or virtually  She can call or mychart if things change or with questions in the interim.                Varinder Canseco MD   of Neurology   [facility]           Varinder Canseco MD   of Neurology   [facility]      The total time of this encounter today amounted to 32 minutes. This time included time spent with the patient, prep work, ordering tests, and performing post visit documentation.        Again, thank you for allowing me to participate in the care of your patient.        Sincerely,        Rommel Canseco MD

## 2022-01-13 NOTE — PATIENT INSTRUCTIONS
Start taking it 300 mg at night x1 week.  Then go to 300 mg twice a day x1 week.  Can go to 300/600  Lets have you see our pain specialist for an injection.

## 2022-02-01 NOTE — PROGRESS NOTES
[facility]    Outpatient Physical Therapy Discharge Note  Patient: Pattie Easton  : 1980    Beginning/End Dates of Reporting Period:  10/8/21 to 10/22/21    Referring Provider: Rommel Quijano Diagnosis: neck muscle tightness     Client Self Report: Things are feeling a bit better. Hard for her  to do the TP release. Feels like she has less tension when rotating her head. Not having headaches as often (not sure if it's meds or ex that are helping)    Objective Measurements:  Objective Measure: palpation   Details: decreased tone in B UT right worse than left   Objective Measure: upper cervical rotation   Details: 25% limited to the right      Goals:  Goal Identifier 1   Goal Description Patient will improve lower trap strength to 4+/5 in order to decrease strain and overuse of upper neck muscles.    Target Date 21   Date Met      Progress (detail required for progress note):       Goal Identifier 2   Goal Description Patient will relate having fewer than 4 headaches a week.    Target Date 21   Date Met      Progress (detail required for progress note):       Goal Identifier 3   Goal Description Patient will be independent with HEP for improving strength and pain outside of PT.    Target Date 21   Date Met      Progress (detail required for progress note):       Goal Identifier 4   Goal Description Patient will relate less than a 2/10 pain in the neck after a day of work.    Target Date 21   Date Met      Progress (detail required for progress note):         Progress towards Goals:   Progress this reporting period: All information listed above was at patient's last attended PT visit. At her last attended session, patient was showing improvements in strength and tolerating stretching and MT well. Strategies were taught on how to have her  help at home with  MT. Despite improvements, patient has failed to schedule f/u visits within 30 days from last visit thus is being d/c from therapy at this time. Objective measures are all taken from last visit. Current status is unknown at this time.    Plan:  Discharge from therapy.    Discharge:    Reason for Discharge: Patient has failed to schedule further appointments.    Equipment Issued: none    Discharge Plan:  Not completed since patient failed to schedule further appointments.    Renetta Shelton  PT, DPT       2/1/2022   [address] [address]  [email or web address]  Voicemail: [phone]

## 2022-02-02 ENCOUNTER — TELEPHONE (OUTPATIENT)
Dept: ANESTHESIOLOGY | Facility: CLINIC | Age: 42
End: 2022-02-02
Payer: COMMERCIAL

## 2022-02-02 ASSESSMENT — ENCOUNTER SYMPTOMS
NERVOUS/ANXIOUS: 1
EYE IRRITATION: 0
HOARSE VOICE: 0
DOUBLE VISION: 0
PARALYSIS: 0
EXERCISE INTOLERANCE: 0
TASTE DISTURBANCE: 0
SINUS CONGESTION: 1
NUMBNESS: 0
NECK MASS: 0
DIZZINESS: 1
HEADACHES: 1
HYPOTENSION: 0
PALPITATIONS: 1
EYE WATERING: 0
PANIC: 1
INSOMNIA: 0
HYPERTENSION: 0
TINGLING: 0
MEMORY LOSS: 1
DISTURBANCES IN COORDINATION: 0
EYE PAIN: 1
SORE THROAT: 0
SMELL DISTURBANCE: 0
SINUS PAIN: 1
LEG PAIN: 1
SPEECH CHANGE: 1
DECREASED CONCENTRATION: 1
TREMORS: 0
LOSS OF CONSCIOUSNESS: 0
ORTHOPNEA: 0
SEIZURES: 0
SYNCOPE: 0
EYE REDNESS: 0
WEAKNESS: 0
DEPRESSION: 1
LIGHT-HEADEDNESS: 1
SLEEP DISTURBANCES DUE TO BREATHING: 0
TROUBLE SWALLOWING: 0

## 2022-02-02 NOTE — TELEPHONE ENCOUNTER
Called patient and explained that it was indeed a consult, not a procedure. Patient also asked if there was anything sooner, and I was able to  her appointment up to tomorrow.

## 2022-02-02 NOTE — TELEPHONE ENCOUNTER
[facility]    Phone Message    May a detailed message be left on voicemail: yes     Reason for Call: Other: Pt requesting call back. Pt wanting to make sure that her appt that is scheduled for 2/10 in  with Dr. Oliver is a procedure and not a consult, pt stated she was told it was a procedure     Action Taken: Message routed to:  [facility] ([facility]

## 2022-02-03 ENCOUNTER — OFFICE VISIT (OUTPATIENT)
Dept: PALLIATIVE MEDICINE | Facility: CLINIC | Age: 42
End: 2022-02-03
Payer: COMMERCIAL

## 2022-02-03 VITALS
BODY MASS INDEX: 27.93 KG/M2 | HEART RATE: 73 BPM | DIASTOLIC BLOOD PRESSURE: 69 MMHG | WEIGHT: 181 LBS | SYSTOLIC BLOOD PRESSURE: 107 MMHG

## 2022-02-03 DIAGNOSIS — M54.2 NECK PAIN: ICD-10-CM

## 2022-02-03 DIAGNOSIS — Z11.59 ENCOUNTER FOR SCREENING FOR OTHER VIRAL DISEASES: Primary | ICD-10-CM

## 2022-02-03 DIAGNOSIS — M54.81 BILATERAL OCCIPITAL NEURALGIA: ICD-10-CM

## 2022-02-03 DIAGNOSIS — G44.209 TENSION HEADACHE: ICD-10-CM

## 2022-02-03 PROCEDURE — 99204 OFFICE O/P NEW MOD 45 MIN: CPT | Mod: 24

## 2022-02-03 ASSESSMENT — PAIN SCALES - GENERAL: PAINLEVEL: MODERATE PAIN (4)

## 2022-02-03 NOTE — PROGRESS NOTES
"Pattie Easton is a 41 year old female with no significant past medical history  presents with a chief complaint of headaches.  She was referred to our clinic for occipital nerve block.  She is here today for a pre-procedure visit..     The pain has been present for 2.5 years .    The pain is Moderate Pain (4) in severity.    The pain is described as sharp and shooting.   The pain is alleviated by changing positions and looking straight ahead.    It is exacerbated by looking up.    Modalities that have been utilized in the past which were helpful include \"nothing\".    Things that were not helpful, but tried ,include nortriptyline, flexeril, and gabapentin.    The patient has never tried occipital nerve blocks.      Current Outpatient Medications   Medication     VITAMIN D, CHOLECALCIFEROL, PO     gabapentin (NEURONTIN) 300 MG capsule     No current facility-administered medications for this visit.     No Known Allergies   Past Medical History:   Diagnosis Date     NO ACTIVE PROBLEMS      Past Surgical History:   Procedure Laterality Date     CYSTECTOMY PILONIDAL       HYSTERECTOMY, PAP NO LONGER INDICATED  2018     MAMMOPLASTY REDUCTION BILATERAL Bilateral 11/5/2021    Procedure: MAMMOPLASTY, REDUCTION, BILATERAL;  Surgeon: JUN Boland MD;  Location:  OR     Family History   Problem Relation Age of Onset     Alcohol/Drug Father      Hypertension Maternal Grandmother      Alcohol/Drug Sister      Social History     Socioeconomic History     Marital status:      Spouse name: Not on file     Number of children: Not on file     Years of education: Not on file     Highest education level: Not on file   Occupational History     Not on file   Tobacco Use     Smoking status: Current Every Day Smoker     Packs/day: 0.10     Years: 14.00     Pack years: 1.40     Types: Cigarettes     Smokeless tobacco: Former User     Quit date: 10/26/2018     Tobacco comment: 2-3 cig/day   Substance and Sexual Activity "     Alcohol use: No     Comment: ETOH before she knew she was pregnant     Drug use: No     Sexual activity: Yes     Partners: Male   Other Topics Concern      Service No     Blood Transfusions No     Caffeine Concern Yes     Comment: Advised not more than 16 ounces/day     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 Not Asked   Social History Narrative    Lives in [place] with [place] GREGG, and lives in [place] with her son - plans to move to [place].  Korrine smokes 2-3 cig/day.  No indoor cats/kittens.     Social Determinants of Health     Financial Resource Strain: Not on file   Food Insecurity: Not on file   Transportation Needs: Not on file   Physical Activity: Not on file   Stress: Not on file   Social Connections: Not on file   Intimate Partner Violence: Not on file   Housing Stability: Not on file      ROS: 10 point ROS neg other than the symptoms noted above in the HPI.  Physical Exam  Vitals and nursing note reviewed. Exam conducted with a chaperone present.   HENT:      Head: Normocephalic and atraumatic.   Neck:      Vascular: No carotid bruit.   Musculoskeletal:      Cervical back: Tenderness present. No erythema, signs of trauma, rigidity, torticollis or crepitus. Pain with movement and muscular tenderness present. No spinous process tenderness. Decreased range of motion.   Lymphadenopathy:      Cervical: No cervical adenopathy.         A/P:Patient is a 40 y/o lady with a diagnosis of occipital neuralgia.  She has been seen by Neurology and is referred to our clinic for bilateral occipital nerve blocks.  The procedure was described to the patient and  the opportunity was given to answer questions.  The procedure will be performed via ultrasound guidance  Answers for HPI/ROS submitted by the patient on 2/2/2022  General Symptoms: No  Skin Symptoms: No  HENT Symptoms: Yes  EYE SYMPTOMS: Yes  HEART SYMPTOMS: Yes  LUNG SYMPTOMS: No  INTESTINAL SYMPTOMS: No  URINARY SYMPTOMS: No  GYNECOLOGIC SYMPTOMS: No  BREAST SYMPTOMS: No  SKELETAL SYMPTOMS: No  BLOOD SYMPTOMS: No  NERVOUS SYSTEM SYMPTOMS: Yes  MENTAL HEALTH SYMPTOMS: Yes  Ear pain: No  Ear discharge: No  Hearing loss: No  Tinnitus: No  Nosebleeds: No  Congestion: Yes  Sinus pain: Yes  Trouble swallowing: No   Voice hoarseness: No  Mouth sores: No  Sore throat: No  Tooth pain: Yes  Gum tenderness: No  Bleeding gums: No  Change in taste: No  Change in sense of smell: No  Dry mouth: No  Hearing aid used: No  Neck lump: No  Eye pain: Yes  Vision loss: No  Dry eyes: No  Watery eyes: No  Eye bulging: No  Double vision: No  Flashing of lights: Yes  Spots: Yes  Floaters: Yes  Redness: No  Crossed eyes: No  Tunnel Vision: No  Yellowing of eyes: No  Eye irritation: No  Chest pain or pressure: No  Fast or irregular heartbeat: Yes  Pain in legs with walking: Yes  Trouble breathing while lying down: No  Fingers or toes appear blue: No  High blood pressure: No  Low blood pressure: No  Fainting: No  Murmurs: No  Pacemaker: No  Varicose veins: No  Edema or swelling: No  Wake up at night with shortness of breath: No  Light-headedness: Yes  Exercise intolerance: No  Trouble with coordination: No  Dizziness or trouble with balance: Yes  Fainting or black-out spells: No  Memory loss: Yes  Headache: Yes  Seizures: No  Speech problems: Yes  Tingling: No  Tremor: No  Weakness: No  Difficulty walking: No  Paralysis: No  Numbness: No  Nervous or Anxious: Yes  Depression: Yes  Trouble sleeping: No  Trouble thinking or concentrating: Yes  Mood changes: Yes  Panic attacks: Yes

## 2022-02-08 ENCOUNTER — LAB (OUTPATIENT)
Dept: LAB | Facility: CLINIC | Age: 42
End: 2022-02-08
Payer: COMMERCIAL

## 2022-02-08 DIAGNOSIS — Z11.59 ENCOUNTER FOR SCREENING FOR OTHER VIRAL DISEASES: ICD-10-CM

## 2022-02-08 PROCEDURE — U0005 INFEC AGEN DETEC AMPLI PROBE: HCPCS

## 2022-02-08 PROCEDURE — U0003 INFECTIOUS AGENT DETECTION BY NUCLEIC ACID (DNA OR RNA); SEVERE ACUTE RESPIRATORY SYNDROME CORONAVIRUS 2 (SARS-COV-2) (CORONAVIRUS DISEASE [COVID-19]), AMPLIFIED PROBE TECHNIQUE, MAKING USE OF HIGH THROUGHPUT TECHNOLOGIES AS DESCRIBED BY CMS-2020-01-R: HCPCS

## 2022-02-09 LAB — SARS-COV-2 RNA RESP QL NAA+PROBE: NEGATIVE

## 2022-02-10 ENCOUNTER — HOSPITAL ENCOUNTER (OUTPATIENT)
Facility: AMBULATORY SURGERY CENTER | Age: 42
Discharge: HOME | End: 2022-02-10
Payer: COMMERCIAL

## 2022-02-10 VITALS
SYSTOLIC BLOOD PRESSURE: 106 MMHG | OXYGEN SATURATION: 97 % | RESPIRATION RATE: 16 BRPM | DIASTOLIC BLOOD PRESSURE: 54 MMHG

## 2022-02-10 DIAGNOSIS — M54.81 BILATERAL OCCIPITAL NEURALGIA: ICD-10-CM

## 2022-02-10 PROCEDURE — G8907 PT DOC NO EVENTS ON DISCHARG: HCPCS

## 2022-02-10 PROCEDURE — 64450 NJX AA&/STRD OTHER PN/BRANCH: CPT | Mod: RT

## 2022-02-10 PROCEDURE — G8918 PT W/O PREOP ORDER IV AB PRO: HCPCS

## 2022-02-10 RX ORDER — METHYLPREDNISOLONE ACETATE 40 MG/ML
INJECTION, SUSPENSION INTRA-ARTICULAR; INTRALESIONAL; INTRAMUSCULAR; SOFT TISSUE PRN
Status: DISCONTINUED | OUTPATIENT
Start: 2022-02-10 | End: 2022-02-10 | Stop reason: HOSPADM

## 2022-02-10 RX ORDER — BUPIVACAINE HYDROCHLORIDE 2.5 MG/ML
INJECTION, SOLUTION INFILTRATION; PERINEURAL PRN
Status: DISCONTINUED | OUTPATIENT
Start: 2022-02-10 | End: 2022-02-10 | Stop reason: HOSPADM

## 2022-02-10 NOTE — DISCHARGE INSTRUCTIONS
PAIN INJECTION HOME CARE INSTRUCTIONS  Activity  -You may resume most normal activity levels with the exception of strenuous activity. It may help to move in ways that hurt before the injection, to see if the pain is still there, but do not overdo it. Take it easy for the rest of the day.    -DO NOT remove bandaid for 24 hours  -DO NOT shower for 24 hours      Pain  -You may feel immediate pain relief and numbness for a period of time after the injection. This may indicate the medication has reached the right spot.  -Your pain may return after this short pain-free period, or may even be a little worse for a day or two. It may be caused by needle irritation or by the medication itself. The medications usually take two or three days to start working, but can take as long as a week.    -You may use an ice pack for 20 minutes every 2 hours for the first 24 hours  -You may use a heating pad after the first 24 hours  -You may use Tylenol (acetaminophen) every 4 hours or other pain medicines as directed by your physician      DID YOU RECEIVE SEDATION TODAY?  no    If you received sedation please follow these additional safety measures.    Sedation medicine, if given, may remain active for many hours. It is important for the next 24 hours that you do not:  -Drive a car  -Operate machines or power tools  -Consume alcohol, including beer  -Sign any important papers or legal documents    DID YOU RECEIVE STEROIDS TODAY?  yes    Common side effects of steroids:  Not everyone will experience corticosteroid side effects. If side effects are experienced, they will gradually subside in the 7-10 day period following an injection. Most common side effects include:  -Flushed face and/or chest  -Feeling of warmth, particularly in the face but could be an overall feeling of warmth  -Increased blood sugar in diabetic patients  -Menstrual irregularities my occur. If taking hormone-based birth control an alternate method of birth control is  recommended  -Sleep disturbances and/or mood swings are possible  -Leg cramps    PLEASE KEEP TRACK OF YOUR SYMPTOMS AND NOTE ANY CHANGES FOR YOUR DOCTOR.       Please contact us if you have:  -Severe pain  -Fever more than 101.5 degrees Fahrenheit  -Signs of infection at the injection site (redness, swelling, or drainage)      FOR PAIN CENTER PATIENTS:  If you have questions, please contact the Pain Clinic at [phone] Option #1 between the hours of 7:00 am and 3:00 pm Monday through Friday. After office hours you can contact the on call provider by dialing [phone]. If you need immediate attention, we recommend that you go to a hospital emergency room or dial [phone].      FOR PM&R PATIENTS:  For patients seen by the PM and R service, please call [phone]. If you need to fax a pain diary to PM&R the fax number is [phone]. If you are unable to fax, uploading to [facility] is encouraged, then send to provider. If you have procedure scheduling questions please call [phone] Option #2

## 2022-02-15 ENCOUNTER — TELEPHONE (OUTPATIENT)
Dept: NEUROLOGY | Facility: CLINIC | Age: 42
End: 2022-02-15
Payer: COMMERCIAL

## 2022-02-15 NOTE — TELEPHONE ENCOUNTER
----- Message from Estrella Amanda RN sent at 2/4/2022  8:26 AM CST -----  Regarding: FW: Procedure    ----- Message -----  From: Cherelle Bryant LPN  Sent: 2/3/2022   3:35 PM CST  To: Estrella Amanda RN  Subject: Procedure                                        Hello!    This patient will be getting occipital nerve blocks in the ASC.    Thanks!Cherelle

## 2022-02-15 NOTE — TELEPHONE ENCOUNTER
Purpose of call: Follow up after Bilateral occipital nerve block   Date of service: 2/10/22  Spoke with: The pt directly     Location of pain: Headaches  Percentage of pain relief after procedure: 75%  Duration of pain relief: Still at 75% pain relief.     Follow up: She has a video visit scheduled for this Thursday 2/17. She did not receive a pain diary after her procedure last week.    Estrella Amanda RN   Neurology Care Coordinator

## 2022-02-17 ENCOUNTER — VIRTUAL VISIT (OUTPATIENT)
Dept: PALLIATIVE MEDICINE | Facility: CLINIC | Age: 42
End: 2022-02-17
Payer: COMMERCIAL

## 2022-02-17 DIAGNOSIS — M54.81 BILATERAL OCCIPITAL NEURALGIA: Primary | ICD-10-CM

## 2022-02-17 PROCEDURE — 99213 OFFICE O/P EST LOW 20 MIN: CPT | Mod: 95

## 2022-02-17 NOTE — PROGRESS NOTES
Pattie is a 41 year old who is being evaluated via a billable video visit.      How would you like to obtain your AVS? MyChart  If the video visit is dropped, the invitation should be resent by: Text to cell phone: [phone]  Will anyone else be joining your video visit? No      Video Start Time: 0930  Video-Visit Details    Type of service:  Video Visit    Video End Time:1050    Originating Location (pt. Location): Home    Distant Location (provider location):  [facility]     Platform used for Video Visit: Aixa     The patient is a 40 y/o lady with a h/o cervicalgia and headaches who presents for follow up.  She has a diagnosis of occipital neuralgia and was referred to our care for bilateral occipital nerve blocks via ultrasound guidance.  She states that she had, and still has 70 % improvement in her pain.  She states that her sleeping is better and her ROM is improved.  She notes that the left side of her neck still has some stiffness.  Current Outpatient Medications   Medication     VITAMIN D, CHOLECALCIFEROL, PO     gabapentin (NEURONTIN) 300 MG capsule     No current facility-administered medications for this visit.     No Known Allergies  Past Medical History:   Diagnosis Date     NO ACTIVE PROBLEMS      Past Surgical History:   Procedure Laterality Date     CYSTECTOMY PILONIDAL       HYSTERECTOMY, PAP NO LONGER INDICATED  2018     MAMMOPLASTY REDUCTION BILATERAL Bilateral 11/5/2021    Procedure: MAMMOPLASTY, REDUCTION, BILATERAL;  Surgeon: JUN Boland MD;  Location:  OR     Family History   Problem Relation Age of Onset     Alcohol/Drug Father      Hypertension Maternal Grandmother      Alcohol/Drug Sister      Social History     Socioeconomic History     Marital status:      Spouse name: Not on file     Number of children: Not on file     Years of education: Not on file     Highest education level: Not on file   Occupational History      Not on file   Tobacco Use     Smoking status: Current Every Day Smoker     Packs/day: 0.10     Years: 14.00     Pack years: 1.40     Types: Cigarettes     Smokeless tobacco: Former User     Quit date: 10/26/2018     Tobacco comment: 2-3 cig/day   Substance and Sexual Activity     Alcohol use: No     Comment: ETOH before she knew she was pregnant     Drug use: No     Sexual activity: Yes     Partners: Male   Other Topics Concern      Service No     Blood Transfusions No     Caffeine Concern Yes     Comment: Advised not more than 16 ounces/day     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 Not Asked   Social History Narrative    Lives in [place] with [place] GREGG, and lives in [place] with her son - plans to move to [place].  Korrine smokes 2-3 cig/day.  No indoor cats/kittens.     Social Determinants of Health     Financial Resource Strain: Not on file   Food Insecurity: Not on file   Transportation Needs: Not on file   Physical Activity: Not on file   Stress: Not on file   Social Connections: Not on file   Intimate Partner Violence: Not on file   Housing Stability: Not on file      ROS: 10 point ROS neg other than the symptoms noted above in the HPI.  Physical Examination was not performed during this virtual visit.  A/P:  The patient is a 40 y/o lady with occipital neuralgia.  Her pain has improved since the occipital nerve block.  I recommend re-evaluation if the pain returns with the decision to repeat the block or to perform RF denervation.  Will determine after the length of pain relief is confirmed

## 2022-02-17 NOTE — PATIENT INSTRUCTIONS
Follow up:      Follow up with Dr. Canseco. Please let us know if/when your pain is coming back, and we can schedule more blocks.       To speak with a nurse, schedule/reschedule/cancel a clinic appointment, or request a medication refill call: [phone].    You can also reach us by [email or web address]: [email or web address]

## 2022-02-21 NOTE — PROCEDURES
The patient is an 86 y/o male with neck pain and headaches who presents for scheduled occipital nerve block on the right with ultrasound guidance.        Occipital Nerve Block    The patient s identity, the procedure to be performed and the specific site of the procedure was verified in accordance with [facility] Protocol.     Diagnosis:  Occipital Neuralgia           Informed consent was obtained, time-out was performed and the Pt was placed comfortably in the sitting position.  A 27ga needle was advanced using ultrasound guidance  to the area of maximal tenderness and reproduction of normal pain in the area of the  occipital nerve.  Medication was injected after aspiration was negative for blood and CSF.  The procedure was tolerated well and the patient was observed for 30min post-procedure and was given discharge instructions.  No complications were noted.     Side(s):Right  Needle:  27ga hypodermic  Medication:    40mg Methylprednisolone                          2cc 0.25% Bupivacaine     Counseling: Greater than 50% of this patient visit was spent in counseling the patient regarding the treatment of their pain, coordinating their overall treatment plan and assessing their progress.

## 2022-04-11 ENCOUNTER — VIRTUAL VISIT (OUTPATIENT)
Dept: NEUROLOGY | Facility: CLINIC | Age: 42
End: 2022-04-11
Payer: COMMERCIAL

## 2022-04-11 DIAGNOSIS — M54.2 NECK PAIN: ICD-10-CM

## 2022-04-11 DIAGNOSIS — G44.209 TENSION HEADACHE: ICD-10-CM

## 2022-04-11 DIAGNOSIS — M54.81 BILATERAL OCCIPITAL NEURALGIA: Primary | ICD-10-CM

## 2022-04-11 PROCEDURE — 99213 OFFICE O/P EST LOW 20 MIN: CPT | Mod: 95 | Performed by: STUDENT IN AN ORGANIZED HEALTH CARE EDUCATION/TRAINING PROGRAM

## 2022-04-11 NOTE — PROGRESS NOTES
[facility]/[place]  Section of General Neurology  Return Patient  Virtual Visit    Pattie Easton MRN# [id number]   Age: 41 year old YOB: 1980         Interval history:   At last visit our plan entailed:  Nortriptyline: She Didn't do well with nortriptyline.  Previous neck PT not helpful but this was when migraines were ongoing/pre breast reduction so she is interested in trying this again.    Started gabapentin previously, also not well tolerated, trialed occipital nerve blocks via Dr Oliver.  Pain is much improved but does have continued tension in her neck muscles.    She is using a new MyPillow which also helps her neck.   Migraines are essentially gone.    Flexeril makes her feel groggy.  Gabapentin was tough to tolerate also.    Lives in [place].          Past Medical History:     Patient Active Problem List   Diagnosis     Hypertrophy of breast     Bilateral occipital neuralgia     Past Medical History:   Diagnosis Date     NO ACTIVE PROBLEMS         Past Surgical History:     Past Surgical History:   Procedure Laterality Date     CYSTECTOMY PILONIDAL       HYSTERECTOMY, PAP NO LONGER INDICATED  2018     MAMMOPLASTY REDUCTION BILATERAL Bilateral 11/5/2021    Procedure: MAMMOPLASTY, REDUCTION, BILATERAL;  Surgeon: JUN Boland MD;  Location:  OR        Social History:     Social History     Tobacco Use     Smoking status: Current Every Day Smoker     Packs/day: 0.10     Years: 14.00     Pack years: 1.40     Types: Cigarettes     Smokeless tobacco: Former User     Quit date: 10/26/2018     Tobacco comment: 2-3 cig/day   Substance Use Topics     Alcohol use: No     Comment: ETOH before she knew she was pregnant     Drug use: No        Family History:     Family History   Problem Relation Age of Onset     Alcohol/Drug Father      Hypertension Maternal Grandmother      Alcohol/Drug Sister         Medications:     Current Outpatient Medications   Medication Sig      gabapentin (NEURONTIN) 300 MG capsule Take 1 capsule (300 mg) by mouth 3 times daily (Patient not taking: Reported on 2/3/2022)     VITAMIN D, CHOLECALCIFEROL, PO Take by mouth daily     No current facility-administered medications for this visit.        Allergies:   No Known Allergies     Review of Systems:   As noted above     Physical Exam:   General: Seated comfortably in no acute distress.  Neurologic:     Mental Status: Fully alert, attentive and oriented. Speech clear and fluent, no paraphasic errors.     Cranial Nerves: Facial movements symmetric. Hearing not formally tested but intact to conversation.  No dysarthria.     Motor: No tremors or other abnormal movements observed.      Sensory:Not able to be tested virtually          Data: Pertinent prior to visit      MRI CERVICAL SPINE WITHOUT CONTRAST 7/30/2021 8:52 AM      HISTORY: Neck pain, chronic; degenerative changes on x-ray. Neck pain.  Tension headache.     TECHNIQUE: Multiplanar, multisequence MRI of the cervical spine  without contrast.      COMPARISON: X-ray 6/28/2021.     FINDINGS:  Alignment is significant for slight straightening. Bone  marrow demonstrates an area of T2 hyperintense signal within the T3  vertebral body with areas of mixed T1 hyperintense and hypointense  signal. Similar findings within the superior T2 vertebral body. No  abnormal cord signal. No appreciable extraspinal abnormality.     Level by level as follows:     C2-C3:  Disc height maintained. No herniation. Mild bilateral facet  arthropathy. No foraminal or spinal canal stenosis.      C3-C4:  Disc height maintained. Minimal disc osteophyte complex. Mild  bilateral facet arthropathy. No foraminal or spinal canal stenosis.      C4-C5:  Disc height maintained. Minimal disc osteophyte complex with a  tiny central protrusion component. Mild bilateral facet arthropathy.  No foraminal or spinal canal stenosis.      C5-C6:  Mild disc height loss. Disc osteophyte complex with  central  bulge/annular fissure. Mild bilateral facet arthropathy. Mild  bilateral foraminal stenosis. Mild spinal canal stenosis.      C6-C7:  Disc height maintained. No herniation. Mild bilateral facet  arthropathy. No foraminal or spinal canal stenosis.     C7-T1: Disc height maintained. No herniation. Moderate right and mild  left facet arthropathy. No foraminal or spinal canal stenosis.                                                                      IMPRESSION:    1. Mild degenerative change.  2. No high-grade stenoses.  3. 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      I personally reviewed the above imaging and agree with the findings in the report         Assessment and Plan:   Assessment:  Pattie Easton is a pleasant 41 year old female who presents today in follow up for neck pain, tension headache.  It think her head and neck pain is multifactorial.  There are elements of tension headache, possible bilateral occipital neuralgia and rarely migrainous features to some of her headaches as well.  Headaches are much improved after occipital nerve block.  She now very rarely if ever gets migraine types of headaches, which is excellent.  Neck pain persists, however.  Previously didn't tolerate nortriptyline, gabapentin, flexeril.  Will focus on non medication management at this time.      Plan:  --When symptoms return that occipital block alleviated discussed it would then be wise to reach out to myself or Dr. Oliver's office to coordinate repeating this procedure given how much improvement she felt.  Discussed timing of waning of this procedure can be variable  --Neck PT script given to improve neck pain  --4 month follow up, she will reach out with questions or changes in the mean time.             Varinder Canseco MD   of Neurology   [facility]  [place]      The total time of this encounter today amounted to 17 minutes of time on video visit and 26 minutes in total. This time included time spent with the patient, prep work, ordering tests, and performing post visit documentation.

## 2022-04-11 NOTE — LETTER
4/11/2022         RE: Pattie Easton  [id number] [address] [facility] [address]        Dear Colleague,    Thank you for referring your patient, Pattie Easton, to the [facility] [place]. Please see a copy of my visit note below.    [facility]/[place]  Section of General Neurology  Return Patient  Virtual Visit    Pattie Easton MRN# [id number]   Age: 41 year old YOB: 1980         Interval history:   At last visit our plan entailed:  Nortriptyline: She Didn't do well with nortriptyline.  Previous neck PT not helpful but this was when migraines were ongoing/pre breast reduction so she is interested in trying this again.    Started gabapentin previously, also not well tolerated, trialed occipital nerve blocks via Dr Oliver.  Pain is much improved but does have continued tension in her neck muscles.    She is using a new MyPillow which also helps her neck.   Migraines are essentially gone.    Flexeril makes her feel groggy.  Gabapentin was tough to tolerate also.    Lives in [place].          Past Medical History:     Patient Active Problem List   Diagnosis     Hypertrophy of breast     Bilateral occipital neuralgia     Past Medical History:   Diagnosis Date     NO ACTIVE PROBLEMS         Past Surgical History:     Past Surgical History:   Procedure Laterality Date     CYSTECTOMY PILONIDAL       HYSTERECTOMY, PAP NO LONGER INDICATED  2018     MAMMOPLASTY REDUCTION BILATERAL Bilateral 11/5/2021    Procedure: MAMMOPLASTY, REDUCTION, BILATERAL;  Surgeon: JUN Boland MD;  Location:  OR        Social History:     Social History     Tobacco Use     Smoking status: Current Every Day Smoker     Packs/day: 0.10     Years: 14.00     Pack years: 1.40     Types: Cigarettes     Smokeless tobacco: Former User     Quit date: 10/26/2018     Tobacco comment: 2-3 cig/day   Substance Use Topics     Alcohol use: No     Comment: ETOH before she knew she was pregnant      Drug use: No        Family History:     Family History   Problem Relation Age of Onset     Alcohol/Drug Father      Hypertension Maternal Grandmother      Alcohol/Drug Sister         Medications:     Current Outpatient Medications   Medication Sig     gabapentin (NEURONTIN) 300 MG capsule Take 1 capsule (300 mg) by mouth 3 times daily (Patient not taking: Reported on 2/3/2022)     VITAMIN D, CHOLECALCIFEROL, PO Take by mouth daily     No current facility-administered medications for this visit.        Allergies:   No Known Allergies     Review of Systems:   As noted above     Physical Exam:   General: Seated comfortably in no acute distress.  Neurologic:     Mental Status: Fully alert, attentive and oriented. Speech clear and fluent, no paraphasic errors.     Cranial Nerves: Facial movements symmetric. Hearing not formally tested but intact to conversation.  No dysarthria.     Motor: No tremors or other abnormal movements observed.      Sensory:Not able to be tested virtually          Data: Pertinent prior to visit      MRI CERVICAL SPINE WITHOUT CONTRAST 7/30/2021 8:52 AM      HISTORY: Neck pain, chronic; degenerative changes on x-ray. Neck pain.  Tension headache.     TECHNIQUE: Multiplanar, multisequence MRI of the cervical spine  without contrast.      COMPARISON: X-ray 6/28/2021.     FINDINGS:  Alignment is significant for slight straightening. Bone  marrow demonstrates an area of T2 hyperintense signal within the T3  vertebral body with areas of mixed T1 hyperintense and hypointense  signal. Similar findings within the superior T2 vertebral body. No  abnormal cord signal. No appreciable extraspinal abnormality.     Level by level as follows:     C2-C3:  Disc height maintained. No herniation. Mild bilateral facet  arthropathy. No foraminal or spinal canal stenosis.      C3-C4:  Disc height maintained. Minimal disc osteophyte complex. Mild  bilateral facet arthropathy. No foraminal or spinal canal stenosis.       C4-C5:  Disc height maintained. Minimal disc osteophyte complex with a  tiny central protrusion component. Mild bilateral facet arthropathy.  No foraminal or spinal canal stenosis.      C5-C6:  Mild disc height loss. Disc osteophyte complex with central  bulge/annular fissure. Mild bilateral facet arthropathy. Mild  bilateral foraminal stenosis. Mild spinal canal stenosis.      C6-C7:  Disc height maintained. No herniation. Mild bilateral facet  arthropathy. No foraminal or spinal canal stenosis.     C7-T1: Disc height maintained. No herniation. Moderate right and mild  left facet arthropathy. No foraminal or spinal canal stenosis.                                                                      IMPRESSION:    1. Mild degenerative change.  2. No high-grade stenoses.  3. 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      I personally reviewed the above imaging and agree with the findings in the report         Assessment and Plan:   Assessment:  Pattie Easton is a pleasant 41 year old female who presents today in follow up for neck pain, tension headache.  It think her head and neck pain is multifactorial.  There are elements of tension headache, possible bilateral occipital neuralgia and rarely migrainous features to some of her headaches as well.  Headaches are much improved after occipital nerve block.  She now very rarely if ever gets migraine types of headaches, which is excellent.  Neck pain persists, however.  Previously didn't tolerate nortriptyline, gabapentin, flexeril.  Will focus on non medication management at this time.      Plan:  --When symptoms return that occipital block alleviated discussed it would then be wise to reach out to myself or Dr. Oliver's office to coordinate repeating this procedure given how much improvement she felt.  Discussed timing of waning of this procedure can  be variable  --Neck PT script given to improve neck pain  --4 month follow up, she will reach out with questions or changes in the mean time.             Varinder Canseco MD   of Neurology   [facility]      The total time of this encounter today amounted to 17 minutes of time on video visit and 26 minutes in total. This time included time spent with the patient, prep work, ordering tests, and performing post visit documentation.      Pattie is a 41 year old who is being evaluated via a billable video visit.      How would you like to obtain your AVS? MyChart  If the video visit is dropped, the invitation should be resent by: Text to cell phone: [phone]  Will anyone else be joining your video visit? No            Again, thank you for allowing me to participate in the care of your patient.        Sincerely,        Rommel Canseco MD

## 2022-04-11 NOTE — PROGRESS NOTES
Pattie is a 41 year old who is being evaluated via a billable video visit.      How would you like to obtain your AVS? MyChart  If the video visit is dropped, the invitation should be resent by: Text to cell phone: [phone]  Will anyone else be joining your video visit? No

## 2022-05-14 ENCOUNTER — HEALTH MAINTENANCE LETTER (OUTPATIENT)
Age: 42
End: 2022-05-14

## 2022-06-07 ENCOUNTER — VIRTUAL VISIT (OUTPATIENT)
Dept: FAMILY MEDICINE | Facility: CLINIC | Age: 42
End: 2022-06-07
Payer: COMMERCIAL
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
## 2022-06-07 DIAGNOSIS — S90.561A TICK BITE OF RIGHT ANKLE, INITIAL ENCOUNTER: Primary | ICD-10-CM

## 2022-06-07 PROCEDURE — 99213 OFFICE O/P EST LOW 20 MIN: CPT | Mod: 95 | Performed by: NURSE PRACTITIONER

## 2022-06-07 RX ORDER — DOXYCYCLINE HYCLATE 100 MG
100 TABLET ORAL 2 TIMES DAILY
Qty: 14 TABLET | Refills: 0 | Status: SHIPPED | OUTPATIENT
Start: 2022-06-07 | End: 2022-06-14

## 2022-06-07 NOTE — PROGRESS NOTES
"Pattie is a 41 year old who is being evaluated via a billable video visit.      How would you like to obtain your AVS? MyChart  If the video visit is dropped, the invitation should be resent by: Text to cell phone: [phone]  Will anyone else be joining your video visit? No      Video Start Time: 7:43 AM    Assessment & Plan     Tick bite of right ankle, initial encounter  Treating for local skin infection. Lyme testing if other symptoms develop. Warning signs discussed.   Return to clinic with any new or worsening symptoms, and as needed.   - doxycycline hyclate (VIBRA-TABS) 100 MG tablet; Take 1 tablet (100 mg) by mouth 2 times daily for 7 days  - Lyme Disease Total Abs Bld with Reflex to Confirm CLIA; Future             Tobacco Cessation:   reports that she has been smoking cigarettes. She has a 1.40 pack-year smoking history. She quit smokeless tobacco use about 3 years ago.  Tobacco Cessation Action Plan: Self help information given to patient    BMI:   Estimated body mass index is 27.93 kg/m  as calculated from the following:    Height as of 11/3/21: 1.715 m (5' 7.5\").    Weight as of 2/3/22: 82.1 kg (181 lb).   Weight management plan: Patient was referred to their PCP to discuss a diet and exercise plan. PE advised        Return in about 4 weeks (around 7/5/2022) for Physical Exam.    ALFREDO Cerda CNP  [facility] [place] is a 41 year old who presents for the following health issues       Found 4 days ago. Buried in her skin. Has irritated red yong on skin. Dug head out.    about 4 inches above ankle, right leg.     Noted a bulls eye, developing around bite.   Tick was not engorged.     Applying antibiotic ointment to site.     HPI     Pulled tick out of leg on Saturday morning. Feels bruised from bite down to ankle, bright red [place] from bite.           Review of Systems   Constitutional, HEENT, cardiovascular, pulmonary, gi and gu systems are negative, except " as otherwise noted.      Objective           Vitals:  No vitals were obtained today due to virtual visit.    Physical Exam   GENERAL: Healthy, alert and no distress  EYES: Eyes grossly normal to inspection.  No discharge or erythema, or obvious scleral/conjunctival abnormalities.  RESP: No audible wheeze, cough, or visible cyanosis.  No visible retractions or increased work of breathing.    SKIN: apprx 1.5 cm red region- diameter with insect bite in center, no drainage, eschar formation in center, no bullseye rash noted.   NEURO: Cranial nerves grossly intact.  Mentation and speech appropriate for age.  PSYCH: Mentation appears normal, affect normal/bright, judgement and insight intact, normal speech and appearance well-groomed.                Video-Visit Details    Type of service:  Video Visit    Video End Time:7:55 AM    Originating Location (pt. Location): Home    Distant Location (provider location):  [facility] [place]     Platform used for Video Visit: Leatha

## 2022-06-08 ENCOUNTER — TELEPHONE (OUTPATIENT)
Dept: SURGERY | Facility: CLINIC | Age: 42
End: 2022-06-08
Payer: COMMERCIAL

## 2022-06-08 NOTE — TELEPHONE ENCOUNTER
Patient is scheduled in a 10 minute return slot. She needs a 30 minute appointment as this is a new consult for something we have not seen her for previously.    Please contact patient to reschedule.    Katelynn Gutierrez LPN

## 2022-06-14 NOTE — PROGRESS NOTES
Discharge Note -Physical Therapy    NAME:  Pattie Easton  MRN:   [id number]    S:    Pt transitioned to women's health therapy    O:  Initial evaluation will serve as final entry.    A:   Unable to report progress towards goals as she was transitioned to women's health PT    P:  Discharge from PT this date.    Thank you for this referral,    Radha Duque, PT,   [facility][id number]  [facility]  [phone]

## 2022-06-28 ENCOUNTER — TELEPHONE (OUTPATIENT)
Dept: NEUROLOGY | Facility: CLINIC | Age: 42
End: 2022-06-28

## 2022-06-28 DIAGNOSIS — R51.9 MIXED HEADACHE: Primary | ICD-10-CM

## 2022-06-28 DIAGNOSIS — R51.9 WORSENING HEADACHES: ICD-10-CM

## 2022-06-28 RX ORDER — SUMATRIPTAN 50 MG/1
50 TABLET, FILM COATED ORAL
Qty: 9 TABLET | Refills: 3 | Status: SHIPPED | OUTPATIENT
Start: 2022-06-28 | End: 2023-02-06

## 2022-06-28 NOTE — TELEPHONE ENCOUNTER
Patient notes headaches are worsening in intensity and more explosive when they occur/change in character.  This is worrying to her.  After discussion will obtain MRI/MRA head.  If negative would proceed with these being migrainous given visual aura/nausea concurrently but will exclude secondary causes.    Imitrex can be trialed for very bad headaches which we discussed.

## 2022-07-05 ENCOUNTER — HOSPITAL ENCOUNTER (OUTPATIENT)
Dept: MRI IMAGING | Facility: CLINIC | Age: 42
Discharge: HOME | End: 2022-07-05
Attending: STUDENT IN AN ORGANIZED HEALTH CARE EDUCATION/TRAINING PROGRAM
Payer: COMMERCIAL

## 2022-07-05 DIAGNOSIS — R51.9 WORSENING HEADACHES: ICD-10-CM

## 2022-07-05 PROCEDURE — A9585 GADOBUTROL INJECTION: HCPCS | Performed by: STUDENT IN AN ORGANIZED HEALTH CARE EDUCATION/TRAINING PROGRAM

## 2022-07-05 PROCEDURE — 255N000002 HC RX 255 OP 636: Performed by: STUDENT IN AN ORGANIZED HEALTH CARE EDUCATION/TRAINING PROGRAM

## 2022-07-05 PROCEDURE — 70544 MR ANGIOGRAPHY HEAD W/O DYE: CPT

## 2022-07-05 PROCEDURE — 70553 MRI BRAIN STEM W/O & W/DYE: CPT

## 2022-07-05 RX ORDER — GADOBUTROL 604.72 MG/ML
8 INJECTION INTRAVENOUS ONCE
Status: COMPLETED | OUTPATIENT
Start: 2022-07-05 | End: 2022-07-05

## 2022-07-05 RX ADMIN — GADOBUTROL 8 ML: 604.72 INJECTION INTRAVENOUS at 16:27

## 2022-07-06 ENCOUNTER — TELEPHONE (OUTPATIENT)
Dept: NEUROLOGY | Facility: CLINIC | Age: 42
End: 2022-07-06

## 2022-07-06 NOTE — TELEPHONE ENCOUNTER
Called re MRI/MRA results --MRI normal, MRA with possible small aneursym vs infundibular findings.  Nothing large enough to warrant management if present.  Would repeat via CTA in ~6 months to better visualize.      Imitrex is helping--good news    Stress a big trigger, working on this too.

## 2022-07-22 ENCOUNTER — OFFICE VISIT (OUTPATIENT)
Dept: SURGERY | Facility: CLINIC | Age: 42
End: 2022-07-22
Payer: COMMERCIAL

## 2022-07-22 VITALS — BODY MASS INDEX: 27.87 KG/M2 | WEIGHT: 183.9 LBS | HEIGHT: 68 IN

## 2022-07-22 DIAGNOSIS — L98.7 EXCESS SKIN OF ABDOMEN: Primary | ICD-10-CM

## 2022-07-22 PROCEDURE — 99215 OFFICE O/P EST HI 40 MIN: CPT | Performed by: PLASTIC SURGERY

## 2022-07-22 ASSESSMENT — PAIN SCALES - GENERAL: PAINLEVEL: NO PAIN (0)

## 2022-07-22 NOTE — LETTER
7/22/2022         RE: Pattie Easton  [address] [facility] [place] [address]        Dear Colleague,    Thank you for referring your patient, Pattie Easton, to the [facility]. Please see a copy of my visit note below.    FOLLOWUP NOTE    PRESENTING COMPLAINT:  Consultation for body contouring.    HISTORY OF PRESENTING COMPLAINT:  Ms. Easton is 41 years old, well known to my service, last saw me back in 10/2021 and 11/2021 for breast reduction.  Please see my notes for details.  Her history otherwise is unchanged.  She does not smoke as of 2020.  Here because she wants a few more aesthetic changes.  First, she would like her abdominal contour improved.  She feels that she is a little protuberant.  Has some excess tissue, and from a proportion standpoint, she feels her abdomen feels bigger after her breast reduction.  Secondly, she would like some upper pole fullness in both breasts and is interested in fat grafting.    PHYSICAL EXAMINATION:  Vital signs stable.  She is afebrile, in no obvious distress.  She is 5 feet 7-1/2 inches, 183 pounds, BMI 28 kg/m2.  On examination of her breasts, she has well-healed breasts, relatively symmetric.  Right side slightly larger than the left.  The left side has a nipple to inframammary fold that is slightly longer than the right.  She definitely has some upper pole indents bilaterally.  She is just less full in the upper poles.  Her abdomen has a skin-pinch thickness about 4-5 cm.  She has some redundancy in her skin throughout, but no obvious panniculus.  She has adiposity in her flanks.    ASSESSMENT AND PLAN:  Based on the above findings, a diagnosis of status post breast reduction, here to discuss aesthetic body contouring procedures.  I think given her issues at hand, she would technically benefit the most from an abdominoplasty, liposuction of her flanks and excess fatty tissue that is being removed, which can then be used as fat grafting for  bilateral breasts, left more than right.  She also has some standing cutaneous cones in the lateral most aspect of her inframammary fold that she would like removed and that can be removed at the same time.  Quotes for these procedures will be given to the patient.  Overview of each of these procedures including the location of the scars, what to expect, having realistic expectations in terms of outcome were explained in detail in the presence of my nurse, Holley Crowder.  All risks, benefits, and alternatives of the procedures including pain, infection, bleeding, scarring, asymmetry, seromas, hematomas, wound breakdown, wound dehiscence, injury to deeper structures, contour irregularities, numbness, asymmetries, inability to remove all the excess tissue, inability to promise a cup size, fat atrophy, DVT, PE, MI, CVA, pneumonia, renal failure and death were all explained.  She understood them all and wants to proceed.  I look forward to helping her out in the near future.  All questions answered.  She was happy with the visit.    Total time spent with chart review, visit itself and post-visit paperwork was 45 minutes.          Again, thank you for allowing me to participate in the care of your patient.        Sincerely,        JUN Boland MD

## 2022-07-22 NOTE — NURSING NOTE
"Pattie Easton's chief complaint for this visit includes:  Chief Complaint   Patient presents with     Consult     abdominoplasty     PCP: Breanna Camilo    Referring Provider:  No referring provider defined for this encounter.    Ht 1.715 m (5' 7.5\")   Wt 83.4 kg (183 lb 14.4 oz)   LMP  (LMP Unknown)   BMI 28.38 kg/m    No Pain (0)      No Known Allergies      Do you need any medication refills at today's visit? No    Holley Crowder CMA        "

## 2022-07-22 NOTE — PROGRESS NOTES
FOLLOWUP NOTE    PRESENTING COMPLAINT:  Consultation for body contouring.    HISTORY OF PRESENTING COMPLAINT:  Ms. Easton is 41 years old, well known to my service, last saw me back in 10/2021 and 11/2021 for breast reduction.  Please see my notes for details.  Her history otherwise is unchanged.  She does not smoke as of 2020.  Here because she wants a few more aesthetic changes.  First, she would like her abdominal contour improved.  She feels that she is a little protuberant.  Has some excess tissue, and from a proportion standpoint, she feels her abdomen feels bigger after her breast reduction.  Secondly, she would like some upper pole fullness in both breasts and is interested in fat grafting.    PHYSICAL EXAMINATION:  Vital signs stable.  She is afebrile, in no obvious distress.  She is 5 feet 7-1/2 inches, 183 pounds, BMI 28 kg/m2.  On examination of her breasts, she has well-healed breasts, relatively symmetric.  Right side slightly larger than the left.  The left side has a nipple to inframammary fold that is slightly longer than the right.  She definitely has some upper pole indents bilaterally.  She is just less full in the upper poles.  Her abdomen has a skin-pinch thickness about 4-5 cm.  She has some redundancy in her skin throughout, but no obvious panniculus.  She has adiposity in her flanks.    ASSESSMENT AND PLAN:  Based on the above findings, a diagnosis of status post breast reduction, here to discuss aesthetic body contouring procedures.  I think given her issues at hand, she would technically benefit the most from an abdominoplasty, liposuction of her flanks and excess fatty tissue that is being removed, which can then be used as fat grafting for bilateral breasts, left more than right.  She also has some standing cutaneous cones in the lateral most aspect of her inframammary fold that she would like removed and that can be removed at the same time.  Quotes for these procedures will be given to  the patient.  Overview of each of these procedures including the location of the scars, what to expect, having realistic expectations in terms of outcome were explained in detail in the presence of my nurse, Hloley Crowder.  All risks, benefits, and alternatives of the procedures including pain, infection, bleeding, scarring, asymmetry, seromas, hematomas, wound breakdown, wound dehiscence, injury to deeper structures, contour irregularities, numbness, asymmetries, inability to remove all the excess tissue, inability to promise a cup size, fat atrophy, DVT, PE, MI, CVA, pneumonia, renal failure and death were all explained.  She understood them all and wants to proceed.  I look forward to helping her out in the near future.  All questions answered.  She was happy with the visit.    Total time spent with chart review, visit itself and post-visit paperwork was 45 minutes.

## 2022-07-25 ENCOUNTER — MYC MEDICAL ADVICE (OUTPATIENT)
Dept: DERMATOLOGY | Facility: CLINIC | Age: 42
End: 2022-07-25

## 2022-07-26 NOTE — TELEPHONE ENCOUNTER
"Pt called to inquire if she would like quotes emailed or mailed to her address. Pt replied \"both\". Email address confimed and quotes emailed and sent via standard mail to pt..Successful transmission confirmed .Caitlin Mendosa RN    "

## 2022-08-03 ENCOUNTER — TELEPHONE (OUTPATIENT)
Dept: SURGERY | Facility: CLINIC | Age: 42
End: 2022-08-03

## 2022-08-03 DIAGNOSIS — L98.7 EXCESS SKIN OF ABDOMEN: Primary | ICD-10-CM

## 2022-08-03 RX ORDER — CEFAZOLIN SODIUM 2 G/50ML
2 SOLUTION INTRAVENOUS
Status: CANCELLED | OUTPATIENT
Start: 2022-08-03

## 2022-08-03 RX ORDER — CEFAZOLIN SODIUM 2 G/50ML
2 SOLUTION INTRAVENOUS SEE ADMIN INSTRUCTIONS
Status: CANCELLED | OUTPATIENT
Start: 2022-08-03

## 2022-08-03 RX ORDER — HEPARIN SODIUM 5000 [USP'U]/.5ML
5000 INJECTION, SOLUTION INTRAVENOUS; SUBCUTANEOUS
Status: CANCELLED | OUTPATIENT
Start: 2022-08-03

## 2022-08-03 NOTE — TELEPHONE ENCOUNTER
Patient would like to proceed with scheduling surgery.  Will route to provider to place case request.  She would like to schedule in October if possible due to the quote/estimate provided to patient states it is valid for 3 months.

## 2022-08-03 NOTE — TELEPHONE ENCOUNTER
Patient LVM wanting a call back to have someone answer her scheduling questions.     Joann Lopez  In-Clinic Visit Facilitator

## 2022-08-03 NOTE — TELEPHONE ENCOUNTER
Pt called, No answer.  Left general message for pt to call the [facility] clinic back at [phone]....Caitlin Mendosa RN  .

## 2022-08-04 ENCOUNTER — TELEPHONE (OUTPATIENT)
Dept: SURGERY | Facility: CLINIC | Age: 42
End: 2022-08-04

## 2022-08-04 NOTE — TELEPHONE ENCOUNTER
Called patient to schedule surgery with Dr. Boland but was unable to leave a voicemail to call writer back.

## 2022-08-08 ENCOUNTER — VIRTUAL VISIT (OUTPATIENT)
Dept: NEUROLOGY | Facility: CLINIC | Age: 42
End: 2022-08-08
Payer: COMMERCIAL

## 2022-08-08 DIAGNOSIS — M54.81 BILATERAL OCCIPITAL NEURALGIA: ICD-10-CM

## 2022-08-08 DIAGNOSIS — R51.9 MIXED HEADACHE: Primary | ICD-10-CM

## 2022-08-08 DIAGNOSIS — R90.89 ABNORMAL MRA, BRAIN: ICD-10-CM

## 2022-08-08 PROCEDURE — 99213 OFFICE O/P EST LOW 20 MIN: CPT | Mod: 95 | Performed by: STUDENT IN AN ORGANIZED HEALTH CARE EDUCATION/TRAINING PROGRAM

## 2022-08-08 NOTE — PROGRESS NOTES
[facility]  Section of General Neurology  Return Patient  Virtual Visit    Pattie Easton MRN# [id number]   Age: 41 year old YOB: 1980         Interval history:   Imitrex PRN has been quite helpful  Nortriptyline previously was not helpful, of note  Occipital nerve blocks previously provided relief.  She does not think these need to be repeated at this time.      She is not using a pillow at night which is helpful. Imitrex is helpful for very bad headaches.   She hasn't had profound headaches with exception of when she missed meals until noon.    She has 2 new puppies, mini Vincentian Shepherds.      Past Medical History:   Plan at last visit:  --When symptoms return that occipital block alleviated discussed it would then be wise to reach out to myself or Dr. Oliver's office to coordinate repeating this procedure given how much improvement she felt.  Discussed timing of waning of this procedure can be variable  --Neck PT script given to improve neck pain  --4 month follow up, she will reach out with questions or changes in the mean time.        Patient Active Problem List   Diagnosis     Hypertrophy of breast     Bilateral occipital neuralgia     Past Medical History:   Diagnosis Date     NO ACTIVE PROBLEMS         Past Surgical History:     Past Surgical History:   Procedure Laterality Date     CYSTECTOMY PILONIDAL       HYSTERECTOMY, PAP NO LONGER INDICATED  2018     MAMMOPLASTY REDUCTION BILATERAL Bilateral 11/5/2021    Procedure: MAMMOPLASTY, REDUCTION, BILATERAL;  Surgeon: JUN Boland MD;  Location: [facility] OR     NERVE BLOCK OCCIPITAL Bilateral 2/10/2022    Procedure: BLOCK, NERVE, OCCIPITAL;  Surgeon: Gómez Oliver MD;  Location: [facility] OR        Social History:     Social History     Tobacco Use     Smoking status: Former Smoker     Packs/day: 0.10     Years: 14.00     Pack years: 1.40     Types: Cigarettes     Smokeless tobacco: Former User     Quit date:  10/26/2018     Tobacco comment: 2-3 cig/day   Substance Use Topics     Alcohol use: No     Comment: ETOH before she knew she was pregnant     Drug use: No        Family History:     Family History   Problem Relation Age of Onset     Alcohol/Drug Father      Hypertension Maternal Grandmother      Alcohol/Drug Sister         Medications:     Current Outpatient Medications   Medication Sig     SUMAtriptan (IMITREX) 50 MG tablet Take 1 tablet (50 mg) by mouth at onset of headache for migraine May repeat in 2 hours. Max 4 tablets/24 hours.     VITAMIN D, CHOLECALCIFEROL, PO Take by mouth daily     No current facility-administered medications for this visit.        Allergies:   No Known Allergies     Review of Systems:   As noted above     Physical Exam:   General: Seated comfortably in no acute distress.  Neurologic:     Mental Status: Fully alert, attentive and oriented. Speech clear and fluent, no paraphasic errors.     Cranial Nerves: EOM appear intact. Facial movements symmetric. Hearing not formally tested but intact to conversation.  No dysarthria.     Motor: No tremors or other abnormal movements observed.      Sensory:Not able to be tested virtually     Coordination: Not tested     Gait: Not tested         Data: Pertinent prior to visit   Imaging:  MR ANGIOGRAM OF THE HEAD WITHOUT CONTRAST   7/5/2022 4:45 PM      HISTORY: worsening headaches/more explosive to exclude less likely  aneursymal cause; Worsening headaches     TECHNIQUE:  3D time-of-flight MR angiogram of the head without  contrast.     COMPARISON: None.     FINDINGS: The vertebral arteries, basilar artery, and posterior  cerebral arteries are patent. The internal carotid arteries, anterior  cerebral arteries, and middle cerebral arteries are patent. No  evidence of large vessel occlusion or high-grade stenosis. A 2 to 3 mm  conically shaped outpouching is present at the left anterior choroidal  artery origin with probable vessel emanating from the  tip. A 1 to 2 mm  outpouching of the right internal carotid artery at the ophthalmic  segment. Incidental persistent left trigeminal artery. Complex  appearance of the anterior communicating artery, probably fenestrated.  Hypoplastic left anterior cerebral artery A1 segment.                                                                      IMPRESSION:  1. Incidental persistent left trigeminal artery.  2. A 2 to 3 mm conically shaped outpouching is present at the left  anterior choroidal artery origin with probable vessel emanating from  the tip. This presumably represents incidental infundibulum, however  given size aneurysm cannot be excluded.  3. A 1 to 2 mm outpouching of the right internal carotid artery just  proximal to the ophthalmic artery origin. This could represent  aneurysm or infundibulum.  4. 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  5. Consider CTA follow-up for further characterization.    MRI BRAIN WITHOUT AND WITH CONTRAST  7/5/2022 4:47 PM     HISTORY:  Worsening headaches      TECHNIQUE:  Multiplanar, multisequence MRI of the brain without and  with 8ml gadavist     COMPARISON: None.     FINDINGS:  The cerebral hemispheres, brainstem, and cerebellum demonstrate normal  morphology and signal. No evidence of ischemia, hemorrhage, mass, mass  effect, or hydrocephalus. No abnormal enhancement or diffusion  restriction.     Marrow signal is within normal limits. The visualized paranasal  sinuses, tympanic cavities, and mastoid cavities are unremarkable.                                                                      IMPRESSION:  Unremarkable MRI of the head.    Procedures:      Laboratory:           Assessment and Plan:   Assessment:  Pattie Easton is a pleasant 41 year old female seen in follow up for mixed headaches.  They were in some ways related to neck tension and occipital nerve pain previously and treatments targeting these have helped.  New  sleeping regimen has helped immensely as well.  She is doing great right now only needing imitrex sparingly.  MRI/MRA reviewed, with likely incidental findings but will obtain CTA before next visit to better understand what they represent/the significance of possible small aneurysm.      Plan:  --Continue imitrex PRN  --CTA ordered, to be obtained shortly before follow up  --Continue lifestyle modifications  --Follow up in 6 months, sooner with issues or questions              Varinder Canseco MD   of Neurology   [facility]      The total time of this encounter today amounted to 12 minutes of time on video visit and 22 minutes in total. This time included time spent with the patient, prep work, ordering tests, and performing post visit documentation.

## 2022-08-08 NOTE — PATIENT INSTRUCTIONS
--Continue imitrex PRN  --CTA ordered, to be obtained shortly before follow up  --Continue lifestyle modifications  --Follow up in 6 months, sooner with issues or questions

## 2022-08-08 NOTE — PROGRESS NOTES
Pattie is a 41 year old who is being evaluated via a billable video visit.      How would you like to obtain your AVS? MyChart  If the video visit is dropped, the invitation should be resent by: Text to cell phone: [phone]  Will anyone else be joining your video visit? No

## 2022-08-08 NOTE — DISCHARGE INSTRUCTIONS
BREAST REDUCTION POST-OPERATIVE INSTRUCTIONS    Instructions       Have someone drive you home after surgery and help you at home for 1-2      days.      Get plenty of rest.      Follow balanced diet.      Decreased activity may promote constipation, so you may want to add      more raw fruit to your diet, and be sure to increase fluid intake. Your doctor       may also order a stool softener.      Take pain medication as prescribed. Do not take aspirin or any products      containing aspirin.      Do not drink alcohol when taking pain medications.      Even when not taking pain medications, no alcohol for 3 weeks as it      causes fluid retention.      If you are taking vitamins with iron, resume these as tolerated.      Do not smoke, as smoking delays healing and increases the risk of      complications.    Activities      Do not drive fpr 10 days following your procedure and until you are no longer taking        any pain medications (narcotics).      Do not drive until you have full range of motion with your arms and can stop the car       or swerve in an emergency.      Start walking the evening of surgery, this helps to reduce swelling and       lowers the chance of blood clots.      Refrain from vigorous activities for 2-6 weeks.  Increase activity gradually as tolerated.      After 2 weeks, you may perform lower body exercise, but must wait the full 6 weeks       prior to performing upper body exercise      Avoid lifting anything over 5 pounds for 2 weeks.      Resume social and employment activities in about 2 weeks (if not too strenuous).    Incision Care      You may shower in 48 hours.  If drainage tubes have been used, you may shower       48 hours after removal of the drains. The ACE wrap (if used) may be rewrapped as needed (if too tight or loose). Use it for support and may subtitute with a sports bra if preferred.       Avoid exposing scars to sun for at least 12 months.      Always use a strong  Department of Anesthesiology  Preprocedure Note       Name:  Nuria Downs   Age:  63 y.o.  :  1959                                          MRN:  [id number]         Date:  2022      Surgeon: Angelito Winston):  Mery Duenas MD    Procedure: Procedure(s):  PHACOEMULSIFICATION WITH INTRAOCULAR LENS IMPLANT - RIGHT EYE, ORA    Medications prior to admission:   Prior to Admission medications    Medication Sig Start Date End Date Taking? Authorizing Provider   acetaminophen (TYLENOL) 325 MG tablet Take 975 mg by mouth in the morning and 975 mg at noon and 975 mg in the evening. 22   Historical Provider, MD   Calcium Polycarbophil (FIBER) 625 MG TABS Take 1,250 mg by mouth in the morning and 1,250 mg before bedtime. 22   Historical Provider, MD   esomeprazole ([address]) sunblock, if sun exposure is unavoidable (SPF 50 or      greater).      Keep the tape on until it starts to curl up on the ends, and then gently remove them.        You may use moisturizing cream at 10 days to help the tape come off. By two weeks,      you may pull off the tape if still in place.      Wear your surgical bra/wrap 24/7 as directed for 4 weeks.      Avoid bras with stays and underwires for 4-6 weeks.      You may pad the incisions with gauze for comfort (panty liners also work well as they        are absorbent and inexpensive).      Inspect daily for signs of infection.      No tub soaking, bathing, or swimming until wounds are healed.      If your breast skin is dry after surgery, you can apply a moisturizer several times a       day.     What to Expect      Despite the three layers of sutures closing your incisions, there will be some oozing       of tissue fluid from them for 2 days or so.  This will soak up on the gauze and the bra       to look like more than it really is.  Report any significant drainage to the clinic.      Most of the higher discomfort will subside after the first few days.      You may experience temporary soreness, bruising, swelling and tightnessin the       breasts as well as discomfort in the incision area.      You may have have normal sensation in the nipples.  This may be more or less than       usual, and usually returns over a couple of months.      Your first menstruation following surgery may cause your breasts to swell and hurt.      You may have random shooting pains, tingling, or other strange sensations in the            skin for a few months.  These will subside.    Appearance      Most of the discoloration and swelling will subside in 2-4 weeks.      Your breasts will feel firm to the touch initially, but will soften with time.      A more natural shape will occur as the breasts \"settle\" in a slightly lower position over     the first few months.      Scars may " [facility]     Neuroendocrine cancer ([facility] Francisco Stewart MD at [place] [address]      be red and thick for 6-12 months (longer in lighter-skinned patients).  IN          time, these usually soften and fade.    Follow-Up Care      Typically, you will have a post op check at 1-2 weeks, and again with your surgeon in      another month.      If drainage tubes have been used, will be removed when the drainage is less than 30      ml per day for 1-2 days.  This usually happens in 1-3 weeks.    When to Call      If you have increased swelling or bruising, particular one side greater than the other.      If swelling and redness persist after a few days.      If you have increased redness along the incision.      If you have severe or increased pain not relieved by medication.      If you have any side effects to medications; such as, rash, nausea,      headache, vomiting, or constipation.      If you have an oral temperature over 100.4 degrees.      If you have any yellowish or greenish drainage from the incisions or      notice a foul odor.      If you have bleeding from the incisions that is difficult to control with      light pressure.      If you have loss of feeling or motion.      Any unanswered concern.    For Medical Questions, Please Call:      [phone], Monday - Friday, 8 a.m. - 4:30 p.m.      After hours and on weekends, call Hospital Paging at [phone] and      ask for the Plastic Surgeon on call.  [facility]  Same-Day Surgery   Adult Discharge Orders & Instructions   For 24 hours after surgery  1. Get plenty of rest.  A responsible adult must stay with you for at least 24 hours after you leave the hospital.   2. Do not drive or use heavy equipment.  If you have weakness or tingling, don't drive or use heavy equipment until this feeling goes away.  3. Do not drink alcohol.  4. Avoid strenuous or risky activities.  Ask for help when climbing stairs.   5. You may feel lightheaded.  IF so, sit for a few minutes before standing.  Have someone help you get up.    6. If you have nausea (feel sick to your stomach): Drink only clear liquids such as apple juice, ginger ale, broth or 7-Up.  Rest may also help.  Be sure to drink enough fluids.  Move to a regular diet as you feel able.  7. You may have a slight fever. Call the doctor if your fever is over 100 F (37.7 C) (taken under the tongue) or lasts longer than 24 hours.  8. You may have a dry mouth, a sore throat, muscle aches or trouble sleeping.  These should go away after 24 hours.  9. Do not make important or legal decisions.   Call your doctor for any of the followin.  Signs of infection (fever, growing tenderness at the surgery site, a large amount of drainage or bleeding, severe pain, foul-smelling drainage, redness, swelling).    2. It has been over 8 to 10 hours since surgery and you are still not able to urinate (pass water).    3.  Headache for over 24 hours.    4.  Numbness, tingling or weakness the day after surgery (if you had spinal anesthesia).  To contact a doctor, call To contact Dr Boland call:  [phone] - during office hours  [phone] - After office hours, ask for the plastic surgery resident on call

## 2022-08-08 NOTE — LETTER
8/8/2022         RE: Pattie Easton  [id number]  [facility] [address]        Dear Colleague,    Thank you for referring your patient, Pattie Easton, to the [facility] [place]. Please see a copy of my visit note below.    Pattie is a 41 year old who is being evaluated via a billable video visit.      How would you like to obtain your AVS? MyChart  If the video visit is dropped, the invitation should be resent by: Text to cell phone: [phone]  Will anyone else be joining your video visit? No            [facility]  Section of General Neurology  Return Patient  Virtual Visit    Pattie Easton MRN# [id number]   Age: 41 year old YOB: 1980         Interval history:   Imitrex PRN has been quite helpful  Nortriptyline previously was not helpful, of note  Occipital nerve blocks previously provided relief.  She does not think these need to be repeated at this time.      She is not using a pillow at night which is helpful. Imitrex is helpful for very bad headaches.   She hasn't had profound headaches with exception of when she missed meals until noon.    She has 2 new puppies, mini [place] Shepherds.      Past Medical History:   Plan at last visit:  --When symptoms return that occipital block alleviated discussed it would then be wise to reach out to myself or Dr. Oliver's office to coordinate repeating this procedure given how much improvement she felt.  Discussed timing of waning of this procedure can be variable  --Neck PT script given to improve neck pain  --4 month follow up, she will reach out with questions or changes in the mean time.        Patient Active Problem List   Diagnosis     Hypertrophy of breast     Bilateral occipital neuralgia     Past Medical History:   Diagnosis Date     NO ACTIVE PROBLEMS         Past Surgical History:     Past Surgical History:   Procedure Laterality Date     CYSTECTOMY PILONIDAL       HYSTERECTOMY,  PAP NO LONGER INDICATED  2018     MAMMOPLASTY REDUCTION BILATERAL Bilateral 11/5/2021    Procedure: MAMMOPLASTY, REDUCTION, BILATERAL;  Surgeon: JUN Boland MD;  Location: [facility] OR     NERVE BLOCK OCCIPITAL Bilateral 2/10/2022    Procedure: BLOCK, NERVE, OCCIPITAL;  Surgeon: Gómez Oliver MD;  Location: [facility] OR        Social History:     Social History     Tobacco Use     Smoking status: Former Smoker     Packs/day: 0.10     Years: 14.00     Pack years: 1.40     Types: Cigarettes     Smokeless tobacco: Former User     Quit date: 10/26/2018     Tobacco comment: 2-3 cig/day   Substance Use Topics     Alcohol use: No     Comment: ETOH before she knew she was pregnant     Drug use: No        Family History:     Family History   Problem Relation Age of Onset     Alcohol/Drug Father      Hypertension Maternal Grandmother      Alcohol/Drug Sister         Medications:     Current Outpatient Medications   Medication Sig     SUMAtriptan (IMITREX) 50 MG tablet Take 1 tablet (50 mg) by mouth at onset of headache for migraine May repeat in 2 hours. Max 4 tablets/24 hours.     VITAMIN D, CHOLECALCIFEROL, PO Take by mouth daily     No current facility-administered medications for this visit.        Allergies:   No Known Allergies     Review of Systems:   As noted above     Physical Exam:   General: Seated comfortably in no acute distress.  Neurologic:     Mental Status: Fully alert, attentive and oriented. Speech clear and fluent, no paraphasic errors.     Cranial Nerves: EOM appear intact. Facial movements symmetric. Hearing not formally tested but intact to conversation.  No dysarthria.     Motor: No tremors or other abnormal movements observed.      Sensory:Not able to be tested virtually     Coordination: Not tested     Gait: Not tested         Data: Pertinent prior to visit   Imaging:  MR ANGIOGRAM OF THE HEAD WITHOUT CONTRAST   7/5/2022 4:45 PM      HISTORY: worsening headaches/more explosive to exclude less  likely  aneursymal cause; Worsening headaches     TECHNIQUE:  3D time-of-flight MR angiogram of the head without  contrast.     COMPARISON: None.     FINDINGS: The vertebral arteries, basilar artery, and posterior  cerebral arteries are patent. The internal carotid arteries, anterior  cerebral arteries, and middle cerebral arteries are patent. No  evidence of large vessel occlusion or high-grade stenosis. A 2 to 3 mm  conically shaped outpouching is present at the left anterior choroidal  artery origin with probable vessel emanating from the tip. A 1 to 2 mm  outpouching of the right internal carotid artery at the ophthalmic  segment. Incidental persistent left trigeminal artery. Complex  appearance of the anterior communicating artery, probably fenestrated.  Hypoplastic left anterior cerebral artery A1 segment.                                                                      IMPRESSION:  1. Incidental persistent left trigeminal artery.  2. A 2 to 3 mm conically shaped outpouching is present at the left  anterior choroidal artery origin with probable vessel emanating from  the tip. This presumably represents incidental infundibulum, however  given size aneurysm cannot be excluded.  3. A 1 to 2 mm outpouching of the right internal carotid artery just  proximal to the ophthalmic artery origin. This could represent  aneurysm or infundibulum.  4. 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  5. Consider CTA follow-up for further characterization.    MRI BRAIN WITHOUT AND WITH CONTRAST  7/5/2022 4:47 PM     HISTORY:  Worsening headaches      TECHNIQUE:  Multiplanar, multisequence MRI of the brain without and  with 8ml gadavist     COMPARISON: None.     FINDINGS:  The cerebral hemispheres, brainstem, and cerebellum demonstrate normal  morphology and signal. No evidence of ischemia, hemorrhage, mass, mass  effect, or hydrocephalus. No abnormal enhancement or  diffusion  restriction.     Marrow signal is within normal limits. The visualized paranasal  sinuses, tympanic cavities, and mastoid cavities are unremarkable.                                                                      IMPRESSION:  Unremarkable MRI of the head.    Procedures:      Laboratory:           Assessment and Plan:   Assessment:  Pattie Easton is a pleasant 41 year old female seen in follow up for mixed headaches.  They were in some ways related to neck tension and occipital nerve pain previously and treatments targeting these have helped.  New sleeping regimen has helped immensely as well.  She is doing great right now only needing imitrex sparingly.  MRI/MRA reviewed, with likely incidental findings but will obtain CTA before next visit to better understand what they represent/the significance of possible small aneurysm.      Plan:  --Continue imitrex PRN  --CTA ordered, to be obtained shortly before follow up  --Continue lifestyle modifications  --Follow up in 6 months, sooner with issues or questions              Varinder Canseco MD   of Neurology   [facility]      The total time of this encounter today amounted to 12 minutes of time on video visit and 22 minutes in total. This time included time spent with the patient, prep work, ordering tests, and performing post visit documentation.        Again, thank you for allowing me to participate in the care of your patient.        Sincerely,        Rommel Canseco MD

## 2022-08-10 NOTE — TELEPHONE ENCOUNTER
Second attempt.    Called patient and LVM to schedule surgery with Dr. Bolnad. Provided direct call back number to writer.

## 2022-08-15 PROBLEM — L98.7 EXCESS SKIN OF ABDOMEN: Status: ACTIVE | Noted: 2022-08-15

## 2022-08-15 NOTE — TELEPHONE ENCOUNTER
Spoke with patient. She wants to take the fat grafting of the bilateral breasts off the case request. Will do a modification once Dr. Boland notes this is ok to do.    Patient would like to have a phone consult with Dr. Boland before surgery to confirm everyone is on the same page. Will ask clinic to please F/U.    This is all cosmetic and patient has had a the quote - she would like to make a payment but payment may be different due to us taking the breast fat grafting off the procedure. Will ask finance team to F/U regarding this once we get the addended case request approved.     Surgery will be scheduled for 10/21 in .    H&P will be with PCP.    COVID test will be done 1-2 days before surgery and will bring a picture of the results.    Post-op scheduled for 10/28.    Writer will mail a surg packet to the patient.

## 2022-08-22 ENCOUNTER — PREP FOR PROCEDURE (OUTPATIENT)
Dept: SURGERY | Facility: CLINIC | Age: 42
End: 2022-08-22

## 2022-08-22 NOTE — TELEPHONE ENCOUNTER
"Called pt to offer a pre surgery appt but pt declined stating \"I don't want to come in for 10 min to confirm that there is not going to be fat grafting\"  RN noted the message below that was also sent to  where this was addressed. . RN again offered an appt if she had any concerns or questions that she wanted to discuss prior to surgery and . Pt again states that she is not going to drive all that way for 10 min as she doesn't have any concerns...RN acknowledge pt's comments and pt had no further questions Caitlin Mendosa RN    "

## 2022-08-30 NOTE — TELEPHONE ENCOUNTER
Please inform the FC team when you are ready to move forward with payment or prior authorization.      Thank you,    SLIME Ward  Financial Counselor  [facility]  [address] [place] [address]  [phone]

## 2022-09-03 ENCOUNTER — HEALTH MAINTENANCE LETTER (OUTPATIENT)
Age: 42
End: 2022-09-03

## 2022-09-09 NOTE — TELEPHONE ENCOUNTER
Patient is scheduled in October for surgery. FC team will reach out to the patient once the appointment date gets closer as payment needs to be paid in full 2 weeks prior to date of service.

## 2022-09-30 ENCOUNTER — TELEPHONE (OUTPATIENT)
Dept: SURGERY | Facility: CLINIC | Age: 42
End: 2022-09-30

## 2022-09-30 NOTE — TELEPHONE ENCOUNTER
Patient called in requesting assistance with her short term disability paperwork. Will route to clinic.

## 2022-10-07 NOTE — TELEPHONE ENCOUNTER
JUN Boland MD  You; [facility] [place] 2 hours ago (9:20 AM)         OK     Thanks     Umar    Message text       You  JUN Boland MD; [facility] [place] 2 hours ago (9:18 AM)     [facility] Dr. Boland, I'm working on disability paperwork for this patient's abdominoplasty/liposuction surgery.  Is it ok for this patient to be off for three weeks from work with the possibility to return to work sooner if her post op appt goes well?  Thanks! Carolina Lorenzo comment        FMLA forms faxed to pt's FMLA contact.  Fax confirmation received.    Carolina Chaudhary RN

## 2022-10-14 ENCOUNTER — OFFICE VISIT (OUTPATIENT)
Dept: FAMILY MEDICINE | Facility: CLINIC | Age: 42
End: 2022-10-14
Payer: COMMERCIAL

## 2022-10-14 VITALS
DIASTOLIC BLOOD PRESSURE: 66 MMHG | SYSTOLIC BLOOD PRESSURE: 108 MMHG | HEART RATE: 79 BPM | BODY MASS INDEX: 27.74 KG/M2 | RESPIRATION RATE: 18 BRPM | OXYGEN SATURATION: 98 % | WEIGHT: 183 LBS | HEIGHT: 68 IN | TEMPERATURE: 98.1 F

## 2022-10-14 DIAGNOSIS — Z01.818 PRE-OPERATIVE GENERAL PHYSICAL EXAMINATION: Primary | ICD-10-CM

## 2022-10-14 DIAGNOSIS — L98.7 EXCESS SKIN OF ABDOMEN: ICD-10-CM

## 2022-10-14 PROCEDURE — 99214 OFFICE O/P EST MOD 30 MIN: CPT | Performed by: NURSE PRACTITIONER

## 2022-10-14 ASSESSMENT — PAIN SCALES - GENERAL: PAINLEVEL: NO PAIN (0)

## 2022-10-14 NOTE — PROGRESS NOTES
[facility]  [address] [facility] [address]  Phone: [phone]  Primary Provider: Breanna Camilo  Pre-op Performing Provider: OMAR CRUMP  [id number]  PREOPERATIVE EVALUATION:  Today's date: 10/14/2022    Pattie Easton is a 41 year old female who presents for a preoperative evaluation.    Surgical Information:  Surgery/Procedure: Cosmetic abdominoplasty, cosmetic liposuction of abdomen  Surgery Location: [facility]  Surgeon: JUN Boland MD  Surgery Date: 10/21/2022  Time of Surgery: 7:00am  Where patient plans to recover: At home with family  Fax number for surgical facility: Note does not need to be faxed, will be available electronically in Epic.    Type of Anesthesia Anticipated: General    Assessment & Plan     The proposed surgical procedure is considered INTERMEDIATE risk.    Pre-operative general physical examination       Excess skin of abdomen              Risks and Recommendations:  The patient has the following additional risks and recommendations for perioperative complications:   - No identified additional risk factors other than previously addressed    Medication Instructions:  Patient is on no chronic medications    RECOMMENDATION:  APPROVAL GIVEN to proceed with proposed procedure, without further diagnostic evaluation.              Subjective     HPI related to upcoming procedure: Excess skin of abdomen due to weight loss.  Wt Readings from Last 4 Encounters:   10/14/22 83 kg (183 lb)   07/22/22 83.4 kg (183 lb 14.4 oz)   02/03/22 82.1 kg (181 lb)   01/13/22 82.5 kg (181 lb 12.8 oz)         Preop Questions 10/14/2022   1. Have you ever had a heart attack or stroke? No   2. Have you ever had surgery on your heart or blood vessels, such as a stent placement, a coronary artery bypass, or surgery on an artery in your head, neck, heart, or legs? No   3. Do you have chest pain with activity? No   4. Do you  have a history of  heart failure? No   5. Do you currently have a cold, bronchitis or symptoms of other infection? No   6. Do you have a cough, shortness of breath, or wheezing? No   7. Do you or anyone in your family have previous history of blood clots? No   8. Do you or does anyone in your family have a serious bleeding problem such as prolonged bleeding following surgeries or cuts? No   9. Have you ever had problems with anemia or been told to take iron pills? No   10. Have you had any abnormal blood loss such as black, tarry or bloody stools, or abnormal vaginal bleeding? No   11. Have you ever had a blood transfusion? No   12. Are you willing to have a blood transfusion if it is medically needed before, during, or after your surgery? YES   13. Have you or any of your relatives ever had problems with anesthesia? No   14. Do you have sleep apnea, excessive snoring or daytime drowsiness? No   15. Do you have any artifical heart valves or other implanted medical devices like a pacemaker, defibrillator, or continuous glucose monitor? No   16. Do you have artificial joints? No   17. Are you allergic to latex? No   18. Is there any chance that you may be pregnant? No       Health Care Directive:  Patient does not have a Health Care Directive or Living Will: Discussed advance care planning with patient; however, patient declined at this time.    Preoperative Review of :   reviewed - no record of controlled substances prescribed.       Status of Chronic Conditions:  See problem list for active medical problems.  Problems all longstanding and stable, except as noted/documented.  See ROS for pertinent symptoms related to these conditions.      Review of Systems  Constitutional, neuro, ENT, endocrine, pulmonary, cardiac, gastrointestinal, genitourinary, musculoskeletal, integument and psychiatric systems are negative, except as otherwise noted.    Patient Active Problem List    Diagnosis Date Noted     Excess skin  "of abdomen 08/15/2022     Priority: Medium     Added automatically from request for surgery [id number]       Bilateral occipital neuralgia 02/03/2022     Priority: Medium     Added automatically from request for surgery [id number]       Hypertrophy of breast 11/02/2021     Priority: Medium     Added automatically from request for surgery [id number]        Past Medical History:   Diagnosis Date     NO ACTIVE PROBLEMS      Past Surgical History:   Procedure Laterality Date     CYSTECTOMY PILONIDAL       HYSTERECTOMY, PAP NO LONGER INDICATED  2018     MAMMOPLASTY REDUCTION BILATERAL Bilateral 11/5/2021    Procedure: MAMMOPLASTY, REDUCTION, BILATERAL;  Surgeon: JUN Boland MD;  Location: [facility] OR     NERVE BLOCK OCCIPITAL Bilateral 2/10/2022    Procedure: BLOCK, NERVE, OCCIPITAL;  Surgeon: Gómez Oliver MD;  Location: [facility] OR     Current Outpatient Medications   Medication Sig Dispense Refill     SUMAtriptan (IMITREX) 50 MG tablet Take 1 tablet (50 mg) by mouth at onset of headache for migraine May repeat in 2 hours. Max 4 tablets/24 hours. 9 tablet 3     VITAMIN D, CHOLECALCIFEROL, PO Take by mouth daily         No Known Allergies     Social History     Tobacco Use     Smoking status: Former     Packs/day: 0.10     Years: 14.00     Pack years: 1.40     Types: Cigarettes     Smokeless tobacco: Former     Quit date: 10/26/2018     Tobacco comments:     2-3 cig/day   Substance Use Topics     Alcohol use: No     Comment: ETOH before she knew she was pregnant     Family History   Problem Relation Age of Onset     Alcohol/Drug Father      Hypertension Maternal Grandmother      Alcohol/Drug Sister      History   Drug Use No         Objective     /66   Pulse 79   Temp 98.1  F (36.7  C) (Tympanic)   Resp 18   Ht 1.715 m (5' 7.5\")   Wt 83 kg (183 lb)   LMP  (LMP Unknown)   SpO2 98%   BMI 28.24 kg/m      Physical Exam    GENERAL APPEARANCE: healthy, alert and no distress     EYES: EOMI, PERRL     HENT: ear canals " and TM's normal and nose and mouth without ulcers or lesions     NECK: no adenopathy, no asymmetry, masses, or scars and thyroid normal to palpation     RESP: lungs clear to auscultation - no rales, rhonchi or wheezes     CV: regular rates and rhythm, normal S1 S2, no S3 or S4 and no murmur, click or rub     ABDOMEN:  soft, nontender, no HSM or masses and bowel sounds normal     MS: extremities normal- no gross deformities noted, no evidence of inflammation in joints, FROM in all extremities.     SKIN: no suspicious lesions or rashes     NEURO: Normal strength and tone, sensory exam grossly normal, mentation intact and speech normal     PSYCH: mentation appears normal. and affect normal/bright     LYMPHATICS: No cervical adenopathy    Recent Labs   Lab Test 03/22/21  1711   HGB 12.3         POTASSIUM 3.8   CR 0.61        Diagnostics:  No labs were ordered during this visit.   No EKG required, no history of coronary heart disease, significant arrhythmia, peripheral arterial disease or other structural heart disease.    Revised Cardiac Risk Index (RCRI):  The patient has the following serious cardiovascular risks for perioperative complications:   - No serious cardiac risks = 0 points     RCRI Interpretation: 0 points: Class I (very low risk - 0.4% complication rate)           Signed Electronically by: Estrella pSain NP  Copy of this evaluation report is provided to requesting physician.

## 2022-10-20 ENCOUNTER — ANESTHESIA EVENT (OUTPATIENT)
Dept: SURGERY | Facility: AMBULATORY SURGERY CENTER | Age: 42
End: 2022-10-20

## 2022-10-20 NOTE — ANESTHESIA PREPROCEDURE EVALUATION
Anesthesia Pre-Procedure Evaluation    Patient: Pattie Easton   MRN: [id number] : 1980        Procedure : Procedure(s):  Cosmetic ABDOMINOPLASTY, Cosmetic Liposuction of abdomen          Past Medical History:   Diagnosis Date     NO ACTIVE PROBLEMS       Past Surgical History:   Procedure Laterality Date     CYSTECTOMY PILONIDAL       HYSTERECTOMY, PAP NO LONGER INDICATED  2018     MAMMOPLASTY REDUCTION BILATERAL Bilateral 2021    Procedure: MAMMOPLASTY, REDUCTION, BILATERAL;  Surgeon: JUN Boland MD;  Location: [facility]     NERVE BLOCK OCCIPITAL Bilateral 2/10/2022    Procedure: BLOCK, NERVE, OCCIPITAL;  Surgeon: Gómez Oliver MD;  Location: [facility] OR      No Known Allergies   Social History     Tobacco Use     Smoking status: Former     Packs/day: 0.10     Years: 14.00     Pack years: 1.40     Types: Cigarettes     Smokeless tobacco: Former     Quit date: 10/26/2018     Tobacco comments:     2-3 cig/day   Substance Use Topics     Alcohol use: No     Comment: ETOH before she knew she was pregnant      Wt Readings from Last 1 Encounters:   10/14/22 83 kg (183 lb)        Anesthesia Evaluation   Pt has had prior anesthetic. Type: General.        ROS/MED HX  ENT/Pulmonary:  - neg pulmonary ROS     Neurologic:  - neg neurologic ROS     Cardiovascular:  - neg cardiovascular ROS     METS/Exercise Tolerance: >4 METS    Hematologic:       Musculoskeletal: Comment: Occipital neuralgia      GI/Hepatic:  - neg GI/hepatic ROS     Renal/Genitourinary:  - neg Renal ROS     Endo:  - neg endo ROS     Psychiatric/Substance Use:  - neg psychiatric ROS     Infectious Disease:  - neg infectious disease ROS     Malignancy:       Other:      (+) , H/O Chronic Pain,        Physical Exam    Airway  airway exam normal      Mallampati: I       Respiratory Devices and Support         Dental  no notable dental history   Comment: tongue ring removed        Cardiovascular   cardiovascular exam normal          Pulmonary    pulmonary exam normal                OUTSIDE LABS:  CBC:   Lab Results   Component Value Date    WBC 6.4 03/22/2021    WBC 8.6 06/22/2011    HGB 12.3 03/22/2021    HGB 12.4 06/22/2011    HCT 36.1 03/22/2021    HCT 37.0 06/22/2011     03/22/2021     06/22/2011     BMP:   Lab Results   Component Value Date     03/22/2021    POTASSIUM 3.8 03/22/2021    CHLORIDE 106 03/22/2021    CO2 29 03/22/2021    BUN 10 03/22/2021    CR 0.61 03/22/2021     (H) 03/22/2021     COAGS: No results found for: PTT, INR, FIBR  POC:   Lab Results   Component Value Date    HCG Positive (A) 06/22/2011     HEPATIC:   Lab Results   Component Value Date    ALBUMIN 3.8 03/22/2021    PROTTOTAL 7.0 03/22/2021    ALT 24 03/22/2021    AST 7 03/22/2021    ALKPHOS 58 03/22/2021    BILITOTAL 0.2 03/22/2021     OTHER:   Lab Results   Component Value Date    SUHA 8.9 03/22/2021    LIPASE 78 03/22/2021    TSH 1.42 03/22/2021       Anesthesia Plan    ASA Status:  2   NPO Status:  NPO Appropriate    Anesthesia Type: General.     - Airway: ETT      Maintenance: Balanced.        Consents    Anesthesia Plan(s) and associated risks, benefits, and realistic alternatives discussed. Questions answered and patient/representative(s) expressed understanding.     - Discussed: Risks, Benefits and Alternatives for BOTH SEDATION and the PROCEDURE were discussed     - Discussed with:  Patient      - Extended Intubation/Ventilatory Support Discussed: No.      - Patient is DNR/DNI Status: No    Use of blood products discussed: No .     Postoperative Care    Pain management: IV analgesics (Possible TAP block).   PONV prophylaxis: Ondansetron (or other 5HT-3), Dexamethasone or Solumedrol     Comments:           H&P reviewed: Unable to attach H&P to encounter due to EHR limitations. H&P Update: appropriate H&P reviewed, patient examined. No interval changes since H&P (within 30 days).         Isaías Zelaya MD

## 2022-10-21 ENCOUNTER — ANESTHESIA (OUTPATIENT)
Dept: SURGERY | Facility: AMBULATORY SURGERY CENTER | Age: 42
End: 2022-10-21

## 2022-10-21 ENCOUNTER — HOSPITAL ENCOUNTER (OUTPATIENT)
Facility: AMBULATORY SURGERY CENTER | Age: 42
Discharge: HOME | End: 2022-10-21
Attending: PLASTIC SURGERY | Admitting: PLASTIC SURGERY

## 2022-10-21 VITALS
TEMPERATURE: 97.8 F | SYSTOLIC BLOOD PRESSURE: 115 MMHG | OXYGEN SATURATION: 96 % | HEART RATE: 98 BPM | RESPIRATION RATE: 14 BRPM | DIASTOLIC BLOOD PRESSURE: 67 MMHG

## 2022-10-21 DIAGNOSIS — L98.7 EXCESS SKIN OF ABDOMEN: ICD-10-CM

## 2022-10-21 PROCEDURE — 96999 UNLISTED SPEC DERM SVC/PX: CPT | Performed by: PLASTIC SURGERY

## 2022-10-21 PROCEDURE — G8916 PT W IV AB GIVEN ON TIME: HCPCS

## 2022-10-21 PROCEDURE — 360N000053 HC SURGERY LEVEL COSMETIC 180 MIN

## 2022-10-21 PROCEDURE — G8907 PT DOC NO EVENTS ON DISCHARG: HCPCS

## 2022-10-21 RX ORDER — FENTANYL CITRATE 50 UG/ML
INJECTION, SOLUTION INTRAMUSCULAR; INTRAVENOUS PRN
Status: DISCONTINUED | OUTPATIENT
Start: 2022-10-21 | End: 2022-10-21

## 2022-10-21 RX ORDER — MEPERIDINE HYDROCHLORIDE 25 MG/ML
12.5 INJECTION INTRAMUSCULAR; INTRAVENOUS; SUBCUTANEOUS
Status: DISCONTINUED | OUTPATIENT
Start: 2022-10-21 | End: 2022-10-22 | Stop reason: HOSPADM

## 2022-10-21 RX ORDER — EPHEDRINE SULFATE 50 MG/ML
INJECTION, SOLUTION INTRAMUSCULAR; INTRAVENOUS; SUBCUTANEOUS PRN
Status: DISCONTINUED | OUTPATIENT
Start: 2022-10-21 | End: 2022-10-21

## 2022-10-21 RX ORDER — SODIUM CHLORIDE, SODIUM LACTATE, POTASSIUM CHLORIDE, CALCIUM CHLORIDE 600; 310; 30; 20 MG/100ML; MG/100ML; MG/100ML; MG/100ML
INJECTION, SOLUTION INTRAVENOUS CONTINUOUS
Status: DISCONTINUED | OUTPATIENT
Start: 2022-10-21 | End: 2022-10-22 | Stop reason: HOSPADM

## 2022-10-21 RX ORDER — LIDOCAINE 40 MG/G
CREAM TOPICAL
Status: DISCONTINUED | OUTPATIENT
Start: 2022-10-21 | End: 2022-10-22 | Stop reason: HOSPADM

## 2022-10-21 RX ORDER — LIDOCAINE HYDROCHLORIDE 20 MG/ML
INJECTION, SOLUTION INFILTRATION; PERINEURAL PRN
Status: DISCONTINUED | OUTPATIENT
Start: 2022-10-21 | End: 2022-10-21

## 2022-10-21 RX ORDER — DEXAMETHASONE SODIUM PHOSPHATE 4 MG/ML
INJECTION, SOLUTION INTRA-ARTICULAR; INTRALESIONAL; INTRAMUSCULAR; INTRAVENOUS; SOFT TISSUE PRN
Status: DISCONTINUED | OUTPATIENT
Start: 2022-10-21 | End: 2022-10-21

## 2022-10-21 RX ORDER — CEFAZOLIN SODIUM 2 G/100ML
2 INJECTION, SOLUTION INTRAVENOUS
Status: DISCONTINUED | OUTPATIENT
Start: 2022-10-21 | End: 2022-10-22 | Stop reason: HOSPADM

## 2022-10-21 RX ORDER — ALBUTEROL SULFATE 0.83 MG/ML
2.5 SOLUTION RESPIRATORY (INHALATION) EVERY 4 HOURS PRN
Status: DISCONTINUED | OUTPATIENT
Start: 2022-10-21 | End: 2022-10-22 | Stop reason: HOSPADM

## 2022-10-21 RX ORDER — FENTANYL CITRATE 50 UG/ML
25 INJECTION, SOLUTION INTRAMUSCULAR; INTRAVENOUS
Status: DISCONTINUED | OUTPATIENT
Start: 2022-10-21 | End: 2022-10-22 | Stop reason: HOSPADM

## 2022-10-21 RX ORDER — PROPOFOL 10 MG/ML
INJECTION, EMULSION INTRAVENOUS CONTINUOUS PRN
Status: DISCONTINUED | OUTPATIENT
Start: 2022-10-21 | End: 2022-10-21

## 2022-10-21 RX ORDER — OXYCODONE HYDROCHLORIDE 5 MG/1
5-10 TABLET ORAL EVERY 6 HOURS PRN
Qty: 25 TABLET | Refills: 0 | Status: SHIPPED | OUTPATIENT
Start: 2022-10-21 | End: 2023-10-13

## 2022-10-21 RX ORDER — PROPOFOL 10 MG/ML
INJECTION, EMULSION INTRAVENOUS PRN
Status: DISCONTINUED | OUTPATIENT
Start: 2022-10-21 | End: 2022-10-21

## 2022-10-21 RX ORDER — ONDANSETRON 4 MG/1
4 TABLET, ORALLY DISINTEGRATING ORAL EVERY 30 MIN PRN
Status: DISCONTINUED | OUTPATIENT
Start: 2022-10-21 | End: 2022-10-22 | Stop reason: HOSPADM

## 2022-10-21 RX ORDER — ACETAMINOPHEN 325 MG/1
975 TABLET ORAL ONCE
Status: COMPLETED | OUTPATIENT
Start: 2022-10-21 | End: 2022-10-21

## 2022-10-21 RX ORDER — CEFAZOLIN 1 G/1
INJECTION, POWDER, FOR SOLUTION INTRAVENOUS PRN
Status: DISCONTINUED | OUTPATIENT
Start: 2022-10-21 | End: 2022-10-21 | Stop reason: HOSPADM

## 2022-10-21 RX ORDER — AMOXICILLIN 250 MG
1-2 CAPSULE ORAL 2 TIMES DAILY
Qty: 30 TABLET | Refills: 0 | Status: SHIPPED | OUTPATIENT
Start: 2022-10-21 | End: 2023-10-13

## 2022-10-21 RX ORDER — CEFAZOLIN SODIUM 2 G/100ML
2 INJECTION, SOLUTION INTRAVENOUS SEE ADMIN INSTRUCTIONS
Status: DISCONTINUED | OUTPATIENT
Start: 2022-10-21 | End: 2022-10-22 | Stop reason: HOSPADM

## 2022-10-21 RX ORDER — DIAZEPAM 10 MG/2ML
2.5 INJECTION, SOLUTION INTRAMUSCULAR; INTRAVENOUS
Status: DISCONTINUED | OUTPATIENT
Start: 2022-10-21 | End: 2022-10-22 | Stop reason: HOSPADM

## 2022-10-21 RX ORDER — FENTANYL CITRATE 50 UG/ML
25 INJECTION, SOLUTION INTRAMUSCULAR; INTRAVENOUS EVERY 5 MIN PRN
Status: DISCONTINUED | OUTPATIENT
Start: 2022-10-21 | End: 2022-10-22 | Stop reason: HOSPADM

## 2022-10-21 RX ORDER — OXYCODONE HYDROCHLORIDE 5 MG/1
5 TABLET ORAL EVERY 4 HOURS PRN
Status: DISCONTINUED | OUTPATIENT
Start: 2022-10-21 | End: 2022-10-22 | Stop reason: HOSPADM

## 2022-10-21 RX ORDER — ONDANSETRON 2 MG/ML
4 INJECTION INTRAMUSCULAR; INTRAVENOUS EVERY 30 MIN PRN
Status: DISCONTINUED | OUTPATIENT
Start: 2022-10-21 | End: 2022-10-22 | Stop reason: HOSPADM

## 2022-10-21 RX ORDER — HEPARIN SODIUM 5000 [USP'U]/.5ML
5000 INJECTION, SOLUTION INTRAVENOUS; SUBCUTANEOUS
Status: COMPLETED | OUTPATIENT
Start: 2022-10-21 | End: 2022-10-21

## 2022-10-21 RX ORDER — ONDANSETRON 4 MG/1
4 TABLET, ORALLY DISINTEGRATING ORAL EVERY 8 HOURS PRN
Qty: 4 TABLET | Refills: 0 | Status: SHIPPED | OUTPATIENT
Start: 2022-10-21 | End: 2023-10-13

## 2022-10-21 RX ORDER — ONDANSETRON 2 MG/ML
INJECTION INTRAMUSCULAR; INTRAVENOUS PRN
Status: DISCONTINUED | OUTPATIENT
Start: 2022-10-21 | End: 2022-10-21

## 2022-10-21 RX ORDER — BUPIVACAINE HYDROCHLORIDE 2.5 MG/ML
INJECTION, SOLUTION INFILTRATION; PERINEURAL PRN
Status: DISCONTINUED | OUTPATIENT
Start: 2022-10-21 | End: 2022-10-21 | Stop reason: HOSPADM

## 2022-10-21 RX ADMIN — HEPARIN SODIUM 5000 UNITS: 5000 INJECTION, SOLUTION INTRAVENOUS; SUBCUTANEOUS at 06:06

## 2022-10-21 RX ADMIN — DEXAMETHASONE SODIUM PHOSPHATE 4 MG: 4 INJECTION, SOLUTION INTRA-ARTICULAR; INTRALESIONAL; INTRAMUSCULAR; INTRAVENOUS; SOFT TISSUE at 08:01

## 2022-10-21 RX ADMIN — LIDOCAINE HYDROCHLORIDE 80 MG: 20 INJECTION, SOLUTION INFILTRATION; PERINEURAL at 07:07

## 2022-10-21 RX ADMIN — OXYCODONE HYDROCHLORIDE 5 MG: 5 TABLET ORAL at 10:14

## 2022-10-21 RX ADMIN — OXYCODONE HYDROCHLORIDE 5 MG: 5 TABLET ORAL at 10:09

## 2022-10-21 RX ADMIN — PROPOFOL 200 MG: 10 INJECTION, EMULSION INTRAVENOUS at 07:07

## 2022-10-21 RX ADMIN — FENTANYL CITRATE 25 MCG: 50 INJECTION, SOLUTION INTRAMUSCULAR; INTRAVENOUS at 10:04

## 2022-10-21 RX ADMIN — FENTANYL CITRATE 50 MCG: 50 INJECTION, SOLUTION INTRAMUSCULAR; INTRAVENOUS at 07:07

## 2022-10-21 RX ADMIN — Medication 40 MG: at 07:07

## 2022-10-21 RX ADMIN — SODIUM CHLORIDE, SODIUM LACTATE, POTASSIUM CHLORIDE, CALCIUM CHLORIDE: 600; 310; 30; 20 INJECTION, SOLUTION INTRAVENOUS at 06:58

## 2022-10-21 RX ADMIN — SODIUM CHLORIDE, SODIUM LACTATE, POTASSIUM CHLORIDE, CALCIUM CHLORIDE: 600; 310; 30; 20 INJECTION, SOLUTION INTRAVENOUS at 09:22

## 2022-10-21 RX ADMIN — Medication 0.5 MG: at 07:51

## 2022-10-21 RX ADMIN — Medication 10 MG: at 08:02

## 2022-10-21 RX ADMIN — ONDANSETRON 4 MG: 2 INJECTION INTRAMUSCULAR; INTRAVENOUS at 08:01

## 2022-10-21 RX ADMIN — Medication 10 MG: at 07:47

## 2022-10-21 RX ADMIN — EPHEDRINE SULFATE 5 MG: 50 INJECTION, SOLUTION INTRAMUSCULAR; INTRAVENOUS; SUBCUTANEOUS at 08:39

## 2022-10-21 RX ADMIN — FENTANYL CITRATE 25 MCG: 50 INJECTION, SOLUTION INTRAMUSCULAR; INTRAVENOUS at 09:50

## 2022-10-21 RX ADMIN — Medication 0.3 MG: at 09:23

## 2022-10-21 RX ADMIN — CEFAZOLIN SODIUM 2 G: 2 INJECTION, SOLUTION INTRAVENOUS at 06:58

## 2022-10-21 RX ADMIN — Medication 10 MG: at 08:16

## 2022-10-21 RX ADMIN — Medication 0.2 MG: at 09:21

## 2022-10-21 RX ADMIN — ACETAMINOPHEN 975 MG: 325 TABLET ORAL at 06:05

## 2022-10-21 RX ADMIN — PROPOFOL 50 MCG/KG/MIN: 10 INJECTION, EMULSION INTRAVENOUS at 07:13

## 2022-10-21 RX ADMIN — FENTANYL CITRATE 50 MCG: 50 INJECTION, SOLUTION INTRAMUSCULAR; INTRAVENOUS at 07:35

## 2022-10-21 NOTE — OP NOTE
PREOPERATIVE DIAGNOSIS:  Symptomatic excess abdominal wall skin, adiposity of the flanks, and left breast incision standing cutaneous cone.        POSTOPERATIVE DIAGNOSIS:  Symptomatic excess abdominal wall skin, adiposity of the flanks, and left breast incision standing cutaneous cone.         PROCEDURE:  1. Cosmetic abdominoplasty 2. Cosmetic Liposuction of bilateral flanks.       SURGEON:  Ana Lilia Boland MD      RESIDENT: Victor M Santiago MD.      ANESTHESIA:  General anesthesia with endotracheal intubation.        COMPLICATIONS:  Nil.        DRAINS:  None      SPECIMENS:  Nil.     BLOOD LOSS: 100 mL       DESCRIPTION OF PROCEDURE:  After informed consent was taken from the patient, the proper site and procedure was ascertained with the patient, the patient was appropriately marked and taken to the operating room.  She was placed in a supine position with her knees comfortably flexed, pillows underneath them and pneumoboots placed and running prior to induction of anesthesia.  Preoperative antibiotics were given in the OR.  All pressure points were appropriately padded.  General anesthesia was administered without any complications. Her entire lower chest, abdomen, and upper thigh and groin area were prepped and draped in a standard surgical fashion.  She had preoperatively been marked for her abdominoplasty.  I went ahead and remarked the areas. I then injected the tumescent solution in the flanks. I then went ahead and made my inferior incision marked out by potential inferior incision by lifting up on the mons as well as on the lateral abdominal region on each side and while doing that made a symmetric marking about 7 cm from the external genitalia and a few centimeters from the pubic tubercle in the mons area.  A horizontal marking was made symmetrically on each side and then within the hair bearing area and then from that lateral aspect of each line marked out passing through the plane of anterior superior iliac  spine to join the preop markings of the lateral extent of the excision.  Once this was done, I then made my incision, dissected using electrocautery, dissected through the subcutaneous tissues down to the Laure's layer, elevated the skin and subcutaneous tissue in a cephalad direction superficial to the Laure's layer until I was outside the groin and then dove deep to the Laure's layer and elevated the skin and subcutaneous tissue in a cephalad direction just anterior to the anterior abdominal wall, leaving a thin layer of loose areolar tissue on the anterior abdominal wall.  All of this was done in an effort to decrease seroma formation.  During this dissection any large perforators were clipped, ligated and controlled.  Dissection was carried out in a cephalad direction elevating the skin and subcutaneous tissues towards the umbilical plane.  Prior to reaching the umbilicus, I then turned my attention to the umbilicus.  I went ahead and placed a 12 o'clock and 6 o'clock suture and then cut around the umbilicus.  I dissected along the stalk down to the anterior abdominal wall, taking care to feel for any obvious hernia and none was felt.  The lower skin flap was then divided and dissection was carried out around the umbilicus cephalad to the umbilicus I continued my dissection towards the xiphoid in the midline and to the costal margins laterally.  The further lateral I went the less dissection I did to keep the zone 3 intact.  Once dissection in a cephalad direction was done to a point where I felt that I could get plication as well as appropriate redraping of the abdominal wall I stopped this dissection.  I ensured hemostasis.  I then plicated the anterior abdominal wall.  I marked the midline then estimated the redundancy.  I made sure that the patient was relaxed and then used multiple 0 Ethibond suture in an interrupted fashion to plicate the anterior abdominal rectus sheath.  Once this was completed, I  then turned my attention to the liposuction of the flanks. A toltal of 550 ml was aspirated. A 5 mm cannula was used through the open incisions to accomplish this.     I then yurne dmy attention to the umbilicus.  I trimmed it appropriately, then tacked down the 3 o'clock, 6 o'clock and 9 o'clock portions to the anterior abdominal wall. I then turned my attention to the excess skin of the anterior abdominal wall.  I flexed the patient to about 30 degrees and then estimated the excess skin and excised it.  Again, hemostasis was ensured.  Once this was completed, I then marked the new position where the umbilicus would come through. I then used 2-0 Strattafix sutures to quilt the abdominal wall.  I then closed the incision using #0 PDS suture in the superficial fascial system followed by 2-0 Monocryl sutures for the deep dermal layer and then 3-0 Monocryl Strattafix in a running intracuticular manner. The umbilical opening was then made and the umbilicus was sewn in with 2-0 Monocryl sutures and 4-0 Monocryl sutures.     The 2 cm standing cutaneous cone on the left breast IMF lateral incision was then excised and closed with 2-0 Monocryl suture and tape and glue.     The umbilicus was covered with Xeroform and a dry dressing. Prineo was placed over the abdominal incision. An abdominal binder was placed.  The patient tolerated the procedure well.  All counts correct at the end of the case.  The patient was extubated and transferred to a hospital bed in a flexed position.  She was then transferred to the PACU in a stable condition.  All counts were correct at the end of the case.

## 2022-10-21 NOTE — DISCHARGE INSTRUCTIONS
ABDOMINOPLASTY and/or PANNICULECTOMY POST-OPERATIVE INSTRUCTIONS    Instructions  Depending on the complexity of your surgery, you may need to be    admitted to the hospital for a few days.   Have someone drive you home after surgery and help you at home for week.   Get plenty of rest.   Follow balanced diet.   Decreased activity may promote constipation, so you may want to add    more raw fruit to your diet, and be sure to increase fluid intake.   Take pain medication as prescribed. Do not take aspirin or any products     containing aspirin.   Do not drink alcohol when taking pain medications.   Even when not taking pain medications, no alcohol for 3 weeks as it     causes fluid retention.   Do not smoke, as smoking delays healing and increases the risk of  complications.   If you are provided with an abdominal binder, wear it as instructed. Do     not wear a compression garment unless approved by your physician.    Activities   Turning on your side and pushing off with your arm when getting out of     bed will reduce stress on your incision.   Start walking as soon as possible, as this helps to reduce swelling and     lowers the chance of blood clots.   Do not drive until you are no longer taking any pain medications    (narcotics).    Do not drive until you have full range of motion in your legs and no    discomfort in your abdomen when lifting your legs.   No lifting greater than 5 lbs. for 6 weeks.   Resume sexual activity as comfort permits, usually 2-3 weeks     postoperatively.   Avoid straining of abdominal muscles. Strenuous exercise and activities     are restricted for 6 weeks.   Return to work in 3- 6 weeks as directed by your surgeon.    Incision Care   Your surgeon may use staples or sutures to close your incision. You may also     have 2 to 4 drains inserted following your surgery.   You may shower 48 hours after removal of the drainage tubes. Drains     may stay in for several weeks.   Avoid  Elevated liver tests N/V , abdominal pain exposing scars to sun for at least 12 months.   Always use a strong sunblock, if sun exposure is unavoidable (SPF 50 or    greater).   Keep the tape on and allow it to peel off over time.   Keep incisions clean and inspect daily for signs of infection.   Start moisturizing your abdomen and scar by day 10 after surgery.   No tub soaking while sutures or drains are in place.   May wear soft support underpants for comfort; may pad incision with     dressings for comfort.   Sleep with pillow under knees and head elevated on 2 pillows.   No tub soaking, bathing, or swimming while sutures or drains are in place.   May wear soft support underpants for comfort; may pad incision with    dressings for comfort.   Sleep with pillow under knees and head elevated on 2 pillows.    What to Expect   You may experience temporary pain, soreness, numbness of abdominal     skin, incision discomfort.   Maximum discomfort will occur the first few days.   You will have bruising and swelling of the abdomen. The majority of     bruising and swelling will subside in 6-8 weeks.   You may feel tired for several weeks or months.   One or more of your drains may remain in for several weeks.    Appearance   This procedure only removes the pannus, it does not narrow your    waistline.   You will walk slightly bent forward and gradually return to normal  posture over next 3 weeks.   Scars will be reddened for 6 months. After that, they will fade and soften.   The scar will extend from near one hipbone to the other, low on the  abdomen.     Follow-Up Care   Abdominal drains removed when less than 30 ml for 24 hours.   Usually, absorbable stitches are used. Surface staples (if used) removed in 2       weeks but may remain in longer if there is concern of incision separation.    Please note my office will call you 1-2 business days after your procedure to check up on how you're doing and to schedule your post-operative appointment.     When to Call    If you have increased swelling or bruising.   If swelling and redness persist after a few days.   If you have increased redness along the incision. If you have severe or                increased pain not relieved by medication.   If you have any side effects to medications; such as, rash, nausea,    headache, vomiting.   If you have an oral temperature over 100.4 degrees.   If you have any yellowish or greenish drainage from the incisions or    notice a foul odor.     If you have bleeding from the incisions that is difficult to control with light              pressure.   If you have loss of feeling or motion.     For Medical Questions, Please Call:   [phone], Monday-Friday, 8 a.m.- 4:30 pm    After hours and on weekends, call Hospital Paging at [phone] and ask        for the Plastic Surgeon on call.      FAT GRAFTING POST-OPERATIVE INSTRUCTIONS    Instructions      Have someone drive you home after surgery and help you at home for 1-2      days.     Get plenty of rest.     Follow balanced diet.     Decreased activity may promote constipation, so you may want to add      more raw fruit to your diet, and be sure to increase fluid intake.     Take pain medication as prescribed. Do not take aspirin or any products      containing aspirin unless approved by your surgeon.     Do not drink alcohol when taking pain medications.     Even when not taking pain medications, no alcohol for 3 weeks as it      causes fluid retention.     If you are taking vitamins with iron, resume these as tolerated.     Do not smoke, as smoking delays healing and increases the risk of      complications.    Activities     Do not drive until you are no longer taking any pain medications      (narcotics).     Start walking as soon as possible, this helps to reduce swelling and       lowers the chance of blood clots.     Unless stated on this form, discuss your time off work with your surgeon.    Treated Area Care      You may shower  after 24 hours. The ACE wrap (if used) may be rewrapped as needed (if too tight or loose). Use it for support and may subtitute with a sports bra if preferred.  Wear the compression garment (spandex type clothing or the provided sponge and binder) in the area where the liposuction was performed to harvest the fat for the fat injection for 2 weeks post opor per the surgeon's recommendation.     Avoid exposing scars to sun for at least 12 months.     Always use a strong sunblock, if sun exposure is unavoidable (SPF 50 or      greater).     Inspect daily for signs of infection.     No tub soaking, bathing, or swimming while sutures or drains are in place.     You may wear makeup with sunblock protection shortly.     Stay out of the sun until redness and bruising subsides (usually 48      Hours).    What to Expect     Temporary stinging, throbbing, burning sensation, redness, swelling,       bruising, and excess fullness.     Some swelling, bruising or redness in the donor and recipient sites.     Swelling and puffiness may last several weeks.     Redness and bruising usually lasts about 48 hours.     Appearance     Improved skin texture.     Firmer and smoother skin.    Follow-Up Care     With regular follow-up treatments, you can easily maintain your new       look.     Repeated treatments may be necessary.    When to Call     If you have increased swelling or bruising.     If swelling and redness persist after a few days.     If you have increased redness along the incision.     If you have severe or increased pain not relieved by medication.     If you have any side effects to medications; such as, rash, nausea,      headache, vomiting.     If you have an oral temperature over 100.4 degrees.     If you have any yellowish or greenish drainage from the incisions or      notice a foul odor.     If you have bleeding from the incisions that is difficult to control with      light pressure.     If you have loss of feeling  or motion.     If you have any sign of abscesses, open sores, skin peeling or lumpiness.    For Medical Questions, Please Call:     [phone], Monday - Friday, 8 a.m. - 4:30 p.m.     After hours and on weekends, call Hospital Paging at [phone] and      ask for the Plastic Surgeon on call.    While you were at the hospital today you received 975 mg Tylenol at 6:00 am. Maximum daily dosage is 4000 mg.    [facility]   Adult Discharge Orders & Instructions     For 24 hours after surgery    Get plenty of rest.  A responsible adult must stay with you for at least 24 hours after you leave the hospital.   Do not drive or use heavy equipment.  If you have weakness or tingling, don't drive or use heavy equipment until this feeling goes away.  Do not drink alcohol.  Avoid strenuous or risky activities.  Ask for help when climbing stairs.   You may feel lightheaded.  IF so, sit for a few minutes before standing.  Have someone help you get up.   If you have nausea (feel sick to your stomach): Drink only clear liquids such as apple juice, ginger ale, broth or 7-Up.  Rest may also help.  Be sure to drink enough fluids.  Move to a regular diet as you feel able.  You may have a slight fever. Call the doctor if your fever is over 100 F (37.7 C) (taken under the tongue) or lasts longer than 24 hours.  You may have a dry mouth, a sore throat, muscle aches or trouble sleeping.  These should go away after 24 hours.  Do not make important or legal decisions.     Call your doctor for any of the followin.  Signs of infection (fever, growing tenderness at the surgery site, a large amount of drainage or bleeding, severe pain, foul-smelling drainage, redness, swelling).    2. It has been over 8 to 10 hours since surgery and you are still not able to urinate (pass water).    3.  Headache for over 24 hours.                 4. Signs of Covid-19 infection (temperature over 100 degrees, shortness of breath, cough, loss  of taste/smell, generalized body aches, persistent headache,                  chills, sore throat, nausea/vomiting/diarrhea).    To contact Dr Boland call:  [phone] - during office hours  [phone] - After office hours, ask for the plastic surgery resident on call     random liver biopsy abnl EKG Thickened bladder wall

## 2022-10-21 NOTE — ANESTHESIA CARE TRANSFER NOTE
Patient: Pattie Easton    Procedure: Procedure(s):  Cosmetic ABDOMINOPLASTY, Left Breast Dog Ear removal  Cosmetic Liposuction of Abdomen and Flanks       Diagnosis: Excess skin of abdomen [L98.7]  Diagnosis Additional Information: No value filed.    Anesthesia Type:   General     Note:    Oropharynx: oropharynx clear of all foreign objects and spontaneously breathing  Level of Consciousness: drowsy  Oxygen Supplementation: nasal cannula  Level of Supplemental Oxygen (L/min / FiO2): 3  Independent Airway: airway patency satisfactory and stable  Dentition: dentition unchanged  Vital Signs Stable: post-procedure vital signs reviewed and stable  Report to RN Given: handoff report given  Patient transferred to: PACU    Handoff Report: Identifed the Patient, Identified the Reponsible Provider, Reviewed the pertinent medical history, Discussed the surgical course, Reviewed Intra-OP anesthesia mangement and issues during anesthesia, Set expectations for post-procedure period and Allowed opportunity for questions and acknowledgement of understanding      Vitals:  Vitals Value Taken Time   /52 10/21/22 0938   Temp     Pulse 98 10/21/22 0939   Resp 42 10/21/22 0939   SpO2 98 % 10/21/22 0939   Vitals shown include unvalidated device data.    Electronically Signed By: ALFREDO Lainez CRNA  October 21, 2022  9:41 AM

## 2022-10-21 NOTE — ANESTHESIA PROCEDURE NOTES
Airway       Patient location during procedure: OR       Procedure Start/Stop Times: 10/21/2022 7:11 AM  Staff -        CRNA: Axel Barba APRN CRNA       Performed By: other anesthesia staff  Consent for Airway        Urgency: elective  Indications and Patient Condition       Indications for airway management: sarah-procedural       Induction type:intravenous       Mask difficulty assessment: 1 - vent by mask    Final Airway Details       Final airway type: endotracheal airway       Successful airway: ETT - single and Oral  Endotracheal Airway Details        ETT size (mm): 7.0       Cuffed: yes       Successful intubation technique: direct laryngoscopy       DL Blade Type: MAC 3       Grade View of Cords: 1       Adjucts: stylet       Position: Right       Measured from: gums/teeth       Secured at (cm): 21       Bite block used: Oral Airway    Post intubation assessment        Placement verified by: capnometry, equal breath sounds and chest rise        Number of attempts at approach: 1       Secured with: cloth tape       Ease of procedure: easy       Dentition: Lips/oral mucosa injury and Intact    Medication(s) Administered   Medication Administration Time: 10/21/2022 7:11 AM    Additional Comments       Airway by med student

## 2022-10-21 NOTE — ANESTHESIA POSTPROCEDURE EVALUATION
Patient: Pattie Easton    Procedure: Procedure(s):  Cosmetic ABDOMINOPLASTY, Left Breast Dog Ear removal  Cosmetic Liposuction of Abdomen and Flanks       Anesthesia Type:  General    Note:     Postop Pain Control: Uneventful            Sign Out: Well controlled pain   PONV: No   Neuro/Psych: Uneventful            Sign Out: Acceptable/Baseline neuro status   Airway/Respiratory: Uneventful            Sign Out: Acceptable/Baseline resp. status   CV/Hemodynamics: Uneventful            Sign Out: Acceptable CV status; No obvious hypovolemia; No obvious fluid overload   Other NRE: NONE   DID A NON-ROUTINE EVENT OCCUR? No           Last vitals:  Vitals Value Taken Time   /68 10/21/22 1015   Temp 97.6  F (36.4  C) 10/21/22 1015   Pulse 66 10/21/22 1024   Resp 21 10/21/22 1024   SpO2 91 % 10/21/22 1024   Vitals shown include unvalidated device data.    Electronically Signed By: Isaías Zelaya MD  October 21, 2022  10:25 AM

## 2022-10-28 ENCOUNTER — OFFICE VISIT (OUTPATIENT)
Dept: SURGERY | Facility: CLINIC | Age: 42
End: 2022-10-28
Payer: COMMERCIAL

## 2022-10-28 DIAGNOSIS — L98.7 EXCESS SKIN OF ABDOMEN: Primary | ICD-10-CM

## 2022-10-28 PROCEDURE — 99024 POSTOP FOLLOW-UP VISIT: CPT | Performed by: PLASTIC SURGERY

## 2022-10-28 RX ORDER — TRAMADOL HYDROCHLORIDE 50 MG/1
50 TABLET ORAL EVERY 6 HOURS PRN
Qty: 10 TABLET | Refills: 0 | Status: SHIPPED | OUTPATIENT
Start: 2022-10-28 | End: 2022-11-02

## 2022-10-28 NOTE — LETTER
10/28/2022         RE: Pattie Easton  [address]  [facility] [address]        Dear Colleague,    Thank you for referring your patient, Pattie Easton, to the [facility]. Please see a copy of my visit note below.    Service Date: 10/28/2022    POSTOPERATIVE VISIT NOTE    PRESENTING COMPLAINT:  Postoperative visit, status post cosmetic abdominoplasty and cosmetic liposuction of bilateral flanks done 10/21/2022.    HISTORY OF PRESENTING COMPLAINT:  Ms. Easton is 41 years old and is a week out from her surgery.  She is doing very well in terms of her outcome and in ambulation, but is complaining of pain, although she has not taken any pain medicines for the last 24 hours, as she needed to drive to the clinic. Nonetheless, she is overall doing okay.    PHYSICAL EXAMINATION:  Vital signs are stable.  She is afebrile, in no obvious distress.  Everything is healing in well.  Aesthetics are good.    ASSESSMENT AND PLAN:  Based upon the above findings, a diagnosis of cosmetic abdominoplasty and liposuction was made.  I advised continuous pain medication usage.  I gave her a refill for tramadol.  I did encourage aggressive moisturization and being up and about, but not overdoing it.  I will see her back in a week's time to monitor her progress.  All her questions were answered.  She was happy with the visit.  All exam and discussion done in the presence of my medical student, Lilibeth Panchal.    JUN Boland MD        D: 10/31/2022   T: 10/31/2022   MT: Dara    Name:     PATTIE EASTON  MRN:      [id number]        Account:      [id number]   :      1980           Service Date: 10/28/2022       Document: [id number]      Again, thank you for allowing me to participate in the care of your patient.        Sincerely,        JUN Boland MD

## 2022-11-01 NOTE — PROGRESS NOTES
Service Date: 10/28/2022    POSTOPERATIVE VISIT NOTE    PRESENTING COMPLAINT:  Postoperative visit, status post cosmetic abdominoplasty and cosmetic liposuction of bilateral flanks done 10/21/2022.    HISTORY OF PRESENTING COMPLAINT:  Ms. Ohara is 41 years old and is a week out from her surgery.  She is doing very well in terms of her outcome and in ambulation, but is complaining of pain, although she has not taken any pain medicines for the last 24 hours, as she needed to drive to the clinic. Nonetheless, she is overall doing okay.    PHYSICAL EXAMINATION:  Vital signs are stable.  She is afebrile, in no obvious distress.  Everything is healing in well.  Aesthetics are good.    ASSESSMENT AND PLAN:  Based upon the above findings, a diagnosis of cosmetic abdominoplasty and liposuction was made.  I advised continuous pain medication usage.  I gave her a refill for tramadol.  I did encourage aggressive moisturization and being up and about, but not overdoing it.  I will see her back in a week's time to monitor her progress.  All her questions were answered.  She was happy with the visit.  All exam and discussion done in the presence of my medical student, Lilibeth Panchal.    JUN Boland MD        D: 10/31/2022   T: 10/31/2022   MT: Dara    Name:     MAYCO OHARA  MRN:      [id number]        Account:      [id number]   :      1980           Service Date: 10/28/2022       Document: [id number]

## 2022-11-03 ENCOUNTER — MYC MEDICAL ADVICE (OUTPATIENT)
Dept: SURGERY | Facility: CLINIC | Age: 42
End: 2022-11-03

## 2022-11-03 NOTE — TELEPHONE ENCOUNTER
See previous Mychart encounter from today  where this is being addressed. Message routed to  to review and will update pt with his response asap ..Caitlin Mendosa RN

## 2022-11-03 NOTE — TELEPHONE ENCOUNTER
Patient called in stating her FMLA paperwork was denied due to it being cosmetic. She wants to know if mental/emotional health would count for the procedure being medically neccessary. If not, she needs to go back to work on Monday as she is missing a lot of pay and needs to be cleared for surgery. Patient is requesting a call back ASAP to discuss.

## 2022-11-03 NOTE — LETTER
[facility]  [address] [address] [place] [id number]  [phone]          November 4, 2022    RE:  Pattie Easton                                                                                                                                                       [id number] [facility] [id number]            To whom it may concern:    Pattie Easton is under my professional care:  She may return to work with the following: The employee is UNABLE to return to work until 11/9/2022    When the patient returns to work, the following restrictions apply until 11/18/22:  Lift, carry, push, and pull no more than:  5 lbs       Sincerely,        JUN Boland MD

## 2022-11-04 NOTE — TELEPHONE ENCOUNTER
ok'd pt's request. Letter drafted and emailed to pt...Caitlin Rockwell     With restrictions: no lifting > 5 pounds for 4 weeks.     Thanks     Ana Lilia

## 2023-02-02 NOTE — PROGRESS NOTES
[facility]  Section of General Neurology  Return Patient Video Visit    Pattie Easton MRN# [id number]   Age: 42 year old YOB: 1980            Assessment and Plan:   Assessment:  Pattie Easton is a pleasant 42 year old female seen in follow up for mixed headaches.  She is doing much better and is only requiring imitrex PRN.  Due to previously significantly worsening headaches we had obtained MRI/MRA.  MRI was unrevealing but MRA showed possible small aneursyms.  Will obtain CTA to follow up on these findings.  Overall I am pleased with her progress.  All questions answered     Plan:  CTA head and neck  Continue imitrex 50 mg PRN, refilled  Follow up with me in 1 year       Varinder Canseco MD   of Neurology   [facility]      Interval history:     She is still getting occasional headaches.  She is able to use a pillow again.    Headaches remain stress related.    Blood pressure may be a factor.    Imitrex still helps when she needs it.   She feels really good overall.      A/P  Pattie Easton is a pleasant 41 year old female seen in follow up for mixed headaches.  They were in some ways related to neck tension and occipital nerve pain previously and treatments targeting these have helped.  New sleeping regimen has helped immensely as well.  She is doing great right now only needing imitrex sparingly.  MRI/MRA reviewed, with likely incidental findings but will obtain CTA before next visit to better understand what they represent/the significance of possible small aneurysm.        --Continue imitrex PRN  --CTA ordered, to be obtained shortly before follow up  --Continue lifestyle modifications  --Follow up in 6 months, sooner with issues or questions    Past Medical History:     Patient Active Problem List   Diagnosis     Hypertrophy of breast     Bilateral occipital neuralgia     Excess skin of abdomen     Past Medical History:    Diagnosis Date     NO ACTIVE PROBLEMS         Past Surgical History:     Past Surgical History:   Procedure Laterality Date     ABDOMINOPLASTY, PANNICULECTOMY, COMBINED Bilateral 10/21/2022    Procedure: Cosmetic ABDOMINOPLASTY, Left Breast Dog Ear removal;  Surgeon: JUN Boland MD;  Location: [facility]     CYSTECTOMY PILONIDAL       HYSTERECTOMY, PAP NO LONGER INDICATED  2018     LIPOSUCTION (LOCATION) Bilateral 10/21/2022    Procedure: Cosmetic Liposuction of Abdomen and Flanks;  Surgeon: JUN Boland MD;  Location: [facility] OR     MAMMOPLASTY REDUCTION BILATERAL Bilateral 11/5/2021    Procedure: MAMMOPLASTY, REDUCTION, BILATERAL;  Surgeon: JUN Boland MD;  Location: [facility]     NERVE BLOCK OCCIPITAL Bilateral 2/10/2022    Procedure: BLOCK, NERVE, OCCIPITAL;  Surgeon: Gómez Oliver MD;  Location: [facility] OR        Social History:     Social History     Tobacco Use     Smoking status: Former     Packs/day: 0.10     Years: 14.00     Pack years: 1.40     Types: Cigarettes     Smokeless tobacco: Former     Quit date: 10/26/2018     Tobacco comments:     2-3 cig/day   Vaping Use     Vaping Use: Never used   Substance Use Topics     Alcohol use: No     Comment: ETOH before she knew she was pregnant     Drug use: No        Family History:     Family History   Problem Relation Age of Onset     Alcohol/Drug Father      Hypertension Maternal Grandmother      Alcohol/Drug Sister         Medications:     Current Outpatient Medications   Medication Sig     ondansetron (ZOFRAN ODT) 4 MG ODT tab Take 1 tablet (4 mg) by mouth every 8 hours as needed for nausea (Patient not taking: Reported on 10/28/2022)     oxyCODONE (ROXICODONE) 5 MG tablet Take 1-2 tablets (5-10 mg) by mouth every 6 hours as needed for moderate to severe pain     senna-docusate (SENOKOT-S/PERICOLACE) 8.6-50 MG tablet Take 1-2 tablets by mouth 2 times daily     SUMAtriptan (IMITREX) 50 MG tablet Take 1 tablet (50 mg) by mouth at onset of  headache for migraine May repeat in 2 hours. Max 4 tablets/24 hours.     VITAMIN D, CHOLECALCIFEROL, PO Take by mouth daily     No current facility-administered medications for this visit.        Allergies:   No Known Allergies       Physical Exam:   General: Seated comfortably in no acute distress.  Neurologic:     Mental Status: Fully alert, attentive and oriented. Speech clear and fluent, no paraphasic errors.     Cranial Nerves: EOMI with normal smooth pursuit. Facial movements symmetric. Hearing not formally tested but intact to conversation.  No dysarthria.     Motor: No tremors or other abnormal movements observed.      Sensory:Not able to be tested virtually           Data: Pertinent prior to visit   Imaging:  MRA 7/2022  IMPRESSION:  1. Incidental persistent left trigeminal artery.  2. A 2 to 3 mm conically shaped outpouching is present at the left  anterior choroidal artery origin with probable vessel emanating from  the tip. This presumably represents incidental infundibulum, however  given size aneurysm cannot be excluded.  3. A 1 to 2 mm outpouching of the right internal carotid artery just  proximal to the ophthalmic artery origin. This could represent  aneurysm or infundibulum.  4. 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  5. Consider CTA follow-up for further characterization.    MRI BRAIN WITHOUT AND WITH CONTRAST  7/5/2022 4:47 PM     HISTORY:  Worsening headaches      TECHNIQUE:  Multiplanar, multisequence MRI of the brain without and  with 8ml gadavist     COMPARISON: None.     FINDINGS:  The cerebral hemispheres, brainstem, and cerebellum demonstrate normal  morphology and signal. No evidence of ischemia, hemorrhage, mass, mass  effect, or hydrocephalus. No abnormal enhancement or diffusion  restriction.     Marrow signal is within normal limits. The visualized paranasal  sinuses, tympanic cavities, and mastoid cavities are unremarkable.                                                                       IMPRESSION:  Unremarkable MRI of the head.    I personally reviewed the above imaging and agree with the findings in the report.                   The total time of this encounter today amounted to 21 minutes in total. This time included time spent with the patient on video, prep work, ordering tests, reviewing imaging, and performing post visit documentation.

## 2023-02-06 ENCOUNTER — VIRTUAL VISIT (OUTPATIENT)
Dept: NEUROLOGY | Facility: CLINIC | Age: 43
End: 2023-02-06
Payer: COMMERCIAL

## 2023-02-06 DIAGNOSIS — R51.9 MIXED HEADACHE: ICD-10-CM

## 2023-02-06 DIAGNOSIS — R90.89 ABNORMAL MRA, BRAIN: Primary | ICD-10-CM

## 2023-02-06 DIAGNOSIS — R51.9 WORSENING HEADACHES: ICD-10-CM

## 2023-02-06 PROCEDURE — 99213 OFFICE O/P EST LOW 20 MIN: CPT | Mod: 95 | Performed by: STUDENT IN AN ORGANIZED HEALTH CARE EDUCATION/TRAINING PROGRAM

## 2023-02-06 RX ORDER — SUMATRIPTAN 50 MG/1
50 TABLET, FILM COATED ORAL
Qty: 9 TABLET | Refills: 7 | Status: SHIPPED | OUTPATIENT
Start: 2023-02-06 | End: 2023-10-13

## 2023-02-06 NOTE — LETTER
2/6/2023         RE: Pattie Easton  [id number]  [facility] [place] [address]        Dear Colleague,    Thank you for referring your patient, Pattie Easton, to the [facility] [place]. Please see a copy of my visit note below.    [facility]  Section of General Neurology  Return Patient Video Visit    Pattie Easton MRN# [id number]   Age: 42 year old YOB: 1980            Assessment and Plan:   Assessment:  Pattie Easton is a pleasant 42 year old female seen in follow up for mixed headaches.  She is doing much better and is only requiring imitrex PRN.  Due to previously significantly worsening headaches we had obtained MRI/MRA.  MRI was unrevealing but MRA showed possible small aneursyms.  Will obtain CTA to follow up on these findings.  Overall I am pleased with her progress.  All questions answered     Plan:  CTA head and neck  Continue imitrex 50 mg PRN, refilled  Follow up with me in 1 year       Varinder Canseco MD   of Neurology   [facility]      Interval history:     She is still getting occasional headaches.  She is able to use a pillow again.    Headaches remain stress related.    Blood pressure may be a factor.    Imitrex still helps when she needs it.   She feels really good overall.      A/P  Pattie Easton is a pleasant 41 year old female seen in follow up for mixed headaches.  They were in some ways related to neck tension and occipital nerve pain previously and treatments targeting these have helped.  New sleeping regimen has helped immensely as well.  She is doing great right now only needing imitrex sparingly.  MRI/MRA reviewed, with likely incidental findings but will obtain CTA before next visit to better understand what they represent/the significance of possible small aneurysm.        --Continue imitrex PRN  --CTA ordered, to be obtained shortly before follow up  --Continue lifestyle  modifications  --Follow up in 6 months, sooner with issues or questions    Past Medical History:     Patient Active Problem List   Diagnosis     Hypertrophy of breast     Bilateral occipital neuralgia     Excess skin of abdomen     Past Medical History:   Diagnosis Date     NO ACTIVE PROBLEMS         Past Surgical History:     Past Surgical History:   Procedure Laterality Date     ABDOMINOPLASTY, PANNICULECTOMY, COMBINED Bilateral 10/21/2022    Procedure: Cosmetic ABDOMINOPLASTY, Left Breast Dog Ear removal;  Surgeon: UJN Boland MD;  Location: [facility]     CYSTECTOMY PILONIDAL       HYSTERECTOMY, PAP NO LONGER INDICATED  2018     LIPOSUCTION (LOCATION) Bilateral 10/21/2022    Procedure: Cosmetic Liposuction of Abdomen and Flanks;  Surgeon: JUN Boland MD;  Location: [facility]     MAMMOPLASTY REDUCTION BILATERAL Bilateral 11/5/2021    Procedure: MAMMOPLASTY, REDUCTION, BILATERAL;  Surgeon: JUN Boland MD;  Location: [facility]     NERVE BLOCK OCCIPITAL Bilateral 2/10/2022    Procedure: BLOCK, NERVE, OCCIPITAL;  Surgeon: Gómez Oliver MD;  Location: [facility]        Social History:     Social History     Tobacco Use     Smoking status: Former     Packs/day: 0.10     Years: 14.00     Pack years: 1.40     Types: Cigarettes     Smokeless tobacco: Former     Quit date: 10/26/2018     Tobacco comments:     2-3 cig/day   Vaping Use     Vaping Use: Never used   Substance Use Topics     Alcohol use: No     Comment: ETOH before she knew she was pregnant     Drug use: No        Family History:     Family History   Problem Relation Age of Onset     Alcohol/Drug Father      Hypertension Maternal Grandmother      Alcohol/Drug Sister         Medications:     Current Outpatient Medications   Medication Sig     ondansetron (ZOFRAN ODT) 4 MG ODT tab Take 1 tablet (4 mg) by mouth every 8 hours as needed for nausea (Patient not taking: Reported on 10/28/2022)     oxyCODONE (ROXICODONE) 5 MG tablet Take 1-2 tablets  (5-10 mg) by mouth every 6 hours as needed for moderate to severe pain     senna-docusate (SENOKOT-S/PERICOLACE) 8.6-50 MG tablet Take 1-2 tablets by mouth 2 times daily     SUMAtriptan (IMITREX) 50 MG tablet Take 1 tablet (50 mg) by mouth at onset of headache for migraine May repeat in 2 hours. Max 4 tablets/24 hours.     VITAMIN D, CHOLECALCIFEROL, PO Take by mouth daily     No current facility-administered medications for this visit.        Allergies:   No Known Allergies       Physical Exam:   General: Seated comfortably in no acute distress.  Neurologic:     Mental Status: Fully alert, attentive and oriented. Speech clear and fluent, no paraphasic errors.     Cranial Nerves: EOMI with normal smooth pursuit. Facial movements symmetric. Hearing not formally tested but intact to conversation.  No dysarthria.     Motor: No tremors or other abnormal movements observed.      Sensory:Not able to be tested virtually           Data: Pertinent prior to visit   Imaging:  MRA 7/2022  IMPRESSION:  1. Incidental persistent left trigeminal artery.  2. A 2 to 3 mm conically shaped outpouching is present at the left  anterior choroidal artery origin with probable vessel emanating from  the tip. This presumably represents incidental infundibulum, however  given size aneurysm cannot be excluded.  3. A 1 to 2 mm outpouching of the right internal carotid artery just  proximal to the ophthalmic artery origin. This could represent  aneurysm or infundibulum.  4. 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  5. Consider CTA follow-up for further characterization.    MRI BRAIN WITHOUT AND WITH CONTRAST  7/5/2022 4:47 PM     HISTORY:  Worsening headaches      TECHNIQUE:  Multiplanar, multisequence MRI of the brain without and  with 8ml gadavist     COMPARISON: None.     FINDINGS:  The cerebral hemispheres, brainstem, and cerebellum demonstrate normal  morphology and signal. No evidence of ischemia,  hemorrhage, mass, mass  effect, or hydrocephalus. No abnormal enhancement or diffusion  restriction.     Marrow signal is within normal limits. The visualized paranasal  sinuses, tympanic cavities, and mastoid cavities are unremarkable.                                                                      IMPRESSION:  Unremarkable MRI of the head.    I personally reviewed the above imaging and agree with the findings in the report.                   The total time of this encounter today amounted to 21 minutes in total. This time included time spent with the patient on video, prep work, ordering tests, reviewing imaging, and performing post visit documentation.      Pattie is a 42 year old who is being evaluated via a billable video visit.      How would you like to obtain your AVS? MyChart  If the video visit is dropped, the invitation should be resent by: Text to cell phone: [phone]  Will anyone else be joining your video visit? No        Video-Visit Details    Type of service:  Video Visit     Originating Location (pt. Location):home     Distant Location (provider location): off site   Platform used for Video Visit: Burt  Video data: 8:29-8:39 (10 minutes)  DEANNE Gracia, [facility] ([facility])            Again, thank you for allowing me to participate in the care of your patient.        Sincerely,        Rommel Canseco MD

## 2023-02-06 NOTE — PROGRESS NOTES
Pattie is a 42 year old who is being evaluated via a billable video visit.      How would you like to obtain your AVS? MyChart  If the video visit is dropped, the invitation should be resent by: Text to cell phone: [phone]  Will anyone else be joining your video visit? No        Video-Visit Details    Type of service:  Video Visit     Originating Location (pt. Location):home     Distant Location (provider location): off site   Platform used for Video Visit: well  Video data: 8:29-8:39 (10 minutes)  DEANNE Gracia, AYAN ([facility])

## 2023-02-06 NOTE — PATIENT INSTRUCTIONS
CTA head and neck  Continue imitrex 50 mg PRN, refilled  Follow up with me in 1 year  I will reach out with CTA data in the meanwhile

## 2023-02-10 ENCOUNTER — HOSPITAL ENCOUNTER (OUTPATIENT)
Dept: CT IMAGING | Facility: CLINIC | Age: 43
Discharge: HOME | End: 2023-02-10
Attending: STUDENT IN AN ORGANIZED HEALTH CARE EDUCATION/TRAINING PROGRAM | Admitting: STUDENT IN AN ORGANIZED HEALTH CARE EDUCATION/TRAINING PROGRAM
Payer: COMMERCIAL

## 2023-02-10 DIAGNOSIS — R90.89 ABNORMAL MRA, BRAIN: ICD-10-CM

## 2023-02-10 PROCEDURE — 250N000009 HC RX 250: Performed by: STUDENT IN AN ORGANIZED HEALTH CARE EDUCATION/TRAINING PROGRAM

## 2023-02-10 PROCEDURE — 70496 CT ANGIOGRAPHY HEAD: CPT

## 2023-02-10 PROCEDURE — 250N000011 HC RX IP 250 OP 636: Performed by: STUDENT IN AN ORGANIZED HEALTH CARE EDUCATION/TRAINING PROGRAM

## 2023-02-10 PROCEDURE — 70498 CT ANGIOGRAPHY NECK: CPT

## 2023-02-10 RX ORDER — IOPAMIDOL 755 MG/ML
68 INJECTION, SOLUTION INTRAVASCULAR ONCE
Status: COMPLETED | OUTPATIENT
Start: 2023-02-10 | End: 2023-02-10

## 2023-02-10 RX ADMIN — SODIUM CHLORIDE 100 ML: 9 INJECTION, SOLUTION INTRAVENOUS at 08:31

## 2023-02-10 RX ADMIN — IOPAMIDOL 68 ML: 755 INJECTION, SOLUTION INTRAVENOUS at 08:31

## 2023-06-03 ENCOUNTER — HEALTH MAINTENANCE LETTER (OUTPATIENT)
Age: 43
End: 2023-06-03

## 2023-08-14 ENCOUNTER — VIRTUAL VISIT (OUTPATIENT)
Dept: FAMILY MEDICINE | Facility: CLINIC | Age: 43
End: 2023-08-14
Payer: COMMERCIAL

## 2023-08-14 ENCOUNTER — TELEPHONE (OUTPATIENT)
Dept: CALL CENTER | Age: 43
End: 2023-08-14

## 2023-08-14 DIAGNOSIS — H00.011 HORDEOLUM EXTERNUM OF RIGHT UPPER EYELID: Primary | ICD-10-CM

## 2023-08-14 PROCEDURE — 99213 OFFICE O/P EST LOW 20 MIN: CPT | Mod: VID | Performed by: FAMILY MEDICINE

## 2023-08-14 NOTE — TELEPHONE ENCOUNTER
[facility] Call Center    Phone Message    May a detailed message be left on voicemail: yes     Reason for Call: Appointment Intake    Referring Provider Name: Jericho Lopez, DO in [facility]  Diagnosis and/or Symptoms: Hordeolum externum of right upper eyelid [H00.011], stye, persistent since June 2023. doing warm compresses and not improving.    Patient would like a call back from someone to see if we can get her in ASAP. Writer offered soonest available for 8/29 but patient declined and states she needs to get in now. If we cannot get her in soon for surgery she will go home and cut it herself. It's painful and irritating. Please call patient back at [phone] and advise.      Action Taken: Message routed to:  [facility]    Travel Screening: Not Applicable

## 2023-08-14 NOTE — PROGRESS NOTES
Pattie is a 42 year old who is being evaluated via a billable video visit.      How would you like to obtain your AVS? MyChart  If the video visit is dropped, the invitation should be resent by: Text to cell phone: [phone]  Will anyone else be joining your video visit? No          Assessment & Plan     Hordeolum externum of right upper eyelid  Exam limited as virtual appt. Discussed to continue to use warm compresses. Since ongoing for the last 2.5 months and not improving with warm compresses will refer to Optho for consideration of incision and drainage if deemed appropriate.   -- No surrounding erythema or evidence of infection.   - Adult Eye  Referral    The risks, benefits and treatment options of prescribed medications or other treatments have been discussed with the patient. The patient verbalized their understanding and should call or follow up if no improvement or if they develop further problems.      Jericho Lopez [facility]    Subjective   Pattie is a 42 year old, presenting for the following health issues:  No chief complaint on file.        8/14/2023    10:32 AM   Additional Questions   Roomed by Josiane KHALIL       History of Present Illness       Reason for visit:  Right eye stye  Symptom onset:  More than a month  Symptoms include:  Painful swelling  Symptom intensity:  Moderate  Symptom progression:  Worsening  Had these symptoms before:  Yes  Has tried/received treatment for these symptoms:  No  What makes it worse:  None  What makes it better:  None    She eats 2-3 servings of fruits and vegetables daily.She consumes 1 sweetened beverage(s) daily.She exercises with enough effort to increase her heart rate 9 or less minutes per day.  She exercises with enough effort to increase her heart rate 3 or less days per week.   She is taking medications regularly.     Stye on right upper eyelid. Has been present since June.   Felt like improving for a while initially  after warm compresses. but now has increased in size and not improving with warm compresses  Pea size stye on the right upper eyelid.   No vision changes.   No prior surgeries on the eyes besides lasix.   No surrounding skin erythema.     Review of Systems   Constitutional, HEENT, cardiovascular, pulmonary, gi and gu systems are negative, except as otherwise noted.      Objective    Vitals - Patient Reported  Pain Score: No Pain (0)        Physical Exam   GENERAL: Healthy, alert and no distress  EYES: Eyes grossly normal to inspection. Stye right upper eyelid lateral portion of eye lid.   RESP: No audible wheeze, cough, or visible cyanosis.  No visible retractions or increased work of breathing.    SKIN: Visible skin clear. No significant rash, abnormal pigmentation or lesions.  NEURO: Cranial nerves grossly intact.  Mentation and speech appropriate for age.  PSYCH: Mentation appears normal, affect normal/bright, judgement and insight intact, normal speech and appearance well-groomed.        Video-Visit Details    Type of service:  Video Visit     Originating Location (pt. Location): Other work    Distant Location (provider location):  On-site  Platform used for Video Visit: Aixa

## 2023-08-15 ENCOUNTER — TELEPHONE (OUTPATIENT)
Dept: OPHTHALMOLOGY | Facility: CLINIC | Age: 43
End: 2023-08-15
Payer: COMMERCIAL

## 2023-08-15 NOTE — TELEPHONE ENCOUNTER
LVM for patient regarding scheduling with plastics clinic for next available either at the [facility] or [facility]. Left my direct line for scheduling.

## 2023-08-15 NOTE — TELEPHONE ENCOUNTER
Non urgent, procedure slot, will not remove a painful stye, can be placed with either plastics doctor, here or  location. Pt should try warm compresses QID to help resolve stye while waiting for appt, can place on wait list.     Thanks,   K

## 2023-08-16 ENCOUNTER — TELEPHONE (OUTPATIENT)
Dept: OPHTHALMOLOGY | Facility: CLINIC | Age: 43
End: 2023-08-16
Payer: COMMERCIAL

## 2023-08-16 NOTE — TELEPHONE ENCOUNTER
"Spoke with patient regarding scheduling with [facility] plastics for painful stye patient would like removed. I scheduled patient for Tuesday 8/22 at 3:30pm for procedure. Patient stated that she is going to \"cut open the stye myself, if not taking care of\" I told the patient the only thing I can do is schedule a appt with the physician. Patient stated she will be here on Tuesday.   "

## 2023-08-18 ENCOUNTER — TELEPHONE (OUTPATIENT)
Dept: OPHTHALMOLOGY | Facility: CLINIC | Age: 43
End: 2023-08-18
Payer: COMMERCIAL

## 2023-08-22 ENCOUNTER — OFFICE VISIT (OUTPATIENT)
Dept: OPHTHALMOLOGY | Facility: CLINIC | Age: 43
End: 2023-08-22
Payer: COMMERCIAL

## 2023-08-22 ENCOUNTER — PRE VISIT (OUTPATIENT)
Dept: OPHTHALMOLOGY | Facility: CLINIC | Age: 43
End: 2023-08-22

## 2023-08-22 DIAGNOSIS — H00.11 CHALAZION OF RIGHT UPPER EYELID: Primary | ICD-10-CM

## 2023-08-22 PROCEDURE — 67800 REMOVE EYELID LESION: CPT | Mod: E3 | Performed by: OPHTHALMOLOGY

## 2023-08-22 PROCEDURE — 99203 OFFICE O/P NEW LOW 30 MIN: CPT | Mod: 25 | Performed by: OPHTHALMOLOGY

## 2023-08-22 RX ORDER — ERYTHROMYCIN 5 MG/G
OINTMENT OPHTHALMIC ONCE
Status: COMPLETED | OUTPATIENT
Start: 2023-08-22 | End: 2023-08-22

## 2023-08-22 RX ADMIN — ERYTHROMYCIN: 5 OINTMENT OPHTHALMIC at 17:01

## 2023-08-22 ASSESSMENT — CONF VISUAL FIELD
OS_INFERIOR_NASAL_RESTRICTION: 0
OD_SUPERIOR_NASAL_RESTRICTION: 0
OS_SUPERIOR_TEMPORAL_RESTRICTION: 0
METHOD: COUNTING FINGERS
OD_INFERIOR_NASAL_RESTRICTION: 0
OD_NORMAL: 1
OD_INFERIOR_TEMPORAL_RESTRICTION: 0
OS_INFERIOR_TEMPORAL_RESTRICTION: 0
OD_SUPERIOR_TEMPORAL_RESTRICTION: 0
OS_SUPERIOR_NASAL_RESTRICTION: 0
OS_NORMAL: 1

## 2023-08-22 ASSESSMENT — VISUAL ACUITY
OD_SC+: -1
OS_SC: 20/20
OD_SC: 20/20
METHOD: SNELLEN - LINEAR

## 2023-08-22 NOTE — PROGRESS NOTES
Chief Complaint(s) and History of Present Illness(es)     Eyelid Cyst Evaluation            Laterality: right upper lid    Associated symptoms: lid swelling, lid pain and discharge.  Negative for   eye pain    Pain scale: 0/10       Referred by Dr. Lopez for right upper eyelid bump.      Comments    Patient states lump has been present since first week of June. Warm   compresses were applied and it drained a little. Patient states some   discharge this morning. Patient states it has gone down in size a little   bit. Patient states some blurriness right eye. Patient denies new floaters   and flashes of light.   SERGO Shannon August 22, 2023 3:31 PM        Assessment & Plan     Pattie Easton is a 42 year old female with the following diagnoses:     ICD-10-CM    1. Chalazion of right upper eyelid  H00.11         Discussed options for treatment including conservative management. The patient opted to have the chalazion drained in clinic. Discussed r/b/a including scarring and possible recurrence as well as pain, bleeding, and bruising.       OPERATIVE NOTE: CHALAZION.    PRE-OPERATIVE DIAGNOSIS: Chalazion right upper lid.    POST-OPERATIVE DIAGNOSIS: Chalazion right upper lid.    PROCEDURE PERFORMED: Incision and drainage of chalazion right upper lid.    SURGEON: Joe Bonner    ASSISTANT: Dr. cMkeon    ANESTHESIA: Local infiltration with 2% lidocaine with epinephrine.    COMPLICATIONS: None    ESTIMATED BLOOD LOSS:  <5mL    HISTORY AND INDICATIONS: Patient presented with an enlarging chalazion in the involved eyelid.  This failed conservative medical management.  After the risks, benefits and alternatives of the proposed procedure were explained, informed consent was obtained.     PROCEDURE: Patient was brought to the minor procedure room and placed supine on the operating table.  Anesthesia was as listed above.  The area was prepped and draped in the typical fashion.  A chalazion clamp was placed over  the involved eyelid and the eyelid everted.  A crutiate incision was made over the chalazion and the lipogranulomatous material removed using the chalazion curette.  Scissors were used to break any septae.  The edges of the cruciate incision were excised using Alli scissors.  Hemostasis was obtained.  The lid was reverted to its normal position, the clamp removed, and the erythromycin antibiotic ointment applied.  The patient tolerated the procedure well and left in stable condition with a tube of antibiotic ointment.     I was present for the entire procedure. Joe Bonner MD       Patient disposition:   No follow-ups on file.     Ted Mckeon MD  PGY-2, Ophthalmology  [facility]  08/22/23           Attending Physician Attestation: Complete documentation of historical and exam elements from today's encounter can be found in the full encounter summary report (not reduplicated in this progress note). I personally obtained the chief complaint(s) and history of present illness. I confirmed and edited as necessary the review of systems, past medical/surgical history, family history, social history, and examination findings as documented by others; and I examined the patient myself. I personally reviewed the relevant tests, images, and reports as documented above. I formulated and edited as necessary the assessment and plan and discussed the findings and management plan with the patient.  -Joe Bonner MD

## 2023-08-22 NOTE — NURSING NOTE
Chief Complaints and History of Present Illnesses   Patient presents with    Eyelid Cyst Evaluation     Chief Complaint(s) and History of Present Illness(es)       Eyelid Cyst Evaluation              Laterality: right upper lid    Associated symptoms: lid swelling, lid pain and discharge.  Negative for eye pain    Pain scale: 0/10              Comments    Patient states lump has been present since first week of June. Warm compresses were applied and it drained a little. Patient states some discharge this morning. Patient states it has gone down in size a little bit. Patient states some blurriness right eye. Patient denies new floaters and flashes of light.   SERGO Shannon August 22, 2023 3:31 PM

## 2023-08-22 NOTE — PATIENT INSTRUCTIONS
Use cold compresses for the first 48 hours to minimize bruising and swelling. It works well to put a washcloth in a bowl of ice water and use the cold washcloth.     After 48 hours switch back to using a hot washcloth to clean around your eyelids in the morning and in the evening to prevent new styes from forming.    Apply the prescribed/provided ointment into the eye three times daily for five days. It will make your vision a bit blurry.

## 2023-08-22 NOTE — LETTER
2023         RE:  :  MRN: Pattie Easton  1980  [id number]     Dear Dr. Lopez,    Thank you for asking me to see your patient, Pattie Easton, for an oculoplastic   consultation.  My assessment and plan are below.  For further details, please see my attached clinic note.      Chief Complaint(s) and History of Present Illness(es)     Eyelid Cyst Evaluation            Laterality: right upper lid    Associated symptoms: lid swelling, lid pain and discharge.  Negative for   eye pain    Pain scale: 0/10       Referred by Dr. Lopez for right upper eyelid bump.      Comments    Patient states lump has been present since first week of . Warm   compresses were applied and it drained a little. Patient states some   discharge this morning. Patient states it has gone down in size a little   bit. Patient states some blurriness right eye. Patient denies new floaters   and flashes of light.   SERGO Shannon 2023 3:31 PM        Assessment & Plan     Pattie Easton is a 42 year old female with the following diagnoses:     ICD-10-CM    1. Chalazion of right upper eyelid  H00.11         Discussed options for treatment including conservative management. The patient opted to have the chalazion drained in clinic. Discussed r/b/a including scarring and possible recurrence as well as pain, bleeding, and bruising.       OPERATIVE NOTE: CHALAZION.    PRE-OPERATIVE DIAGNOSIS: Chalazion right upper lid.    POST-OPERATIVE DIAGNOSIS: Chalazion right upper lid.    PROCEDURE PERFORMED: Incision and drainage of chalazion right upper lid.    SURGEON: Joe Bonner    ASSISTANT: Dr. Mckeon    ANESTHESIA: Local infiltration with 2% lidocaine with epinephrine.    COMPLICATIONS: None    ESTIMATED BLOOD LOSS:  <5mL    HISTORY AND INDICATIONS: Patient presented with an enlarging chalazion in the involved eyelid.  This failed conservative medical management.  After the risks, benefits and alternatives of the  proposed procedure were explained, informed consent was obtained.     PROCEDURE: Patient was brought to the minor procedure room and placed supine on the operating table.  Anesthesia was as listed above.  The area was prepped and draped in the typical fashion.  A chalazion clamp was placed over the involved eyelid and the eyelid everted.  A crutiate incision was made over the chalazion and the lipogranulomatous material removed using the chalazion curette.  Scissors were used to break any septae.  The edges of the cruciate incision were excised using Alli scissors.  Hemostasis was obtained.  The lid was reverted to its normal position, the clamp removed, and the erythromycin antibiotic ointment applied.  The patient tolerated the procedure well and left in stable condition with a tube of antibiotic ointment.     I was present for the entire procedure. Joe Bonner MD       Patient disposition:   No follow-ups on file.     Ted Mckeon MD  PGY-2, Ophthalmology  [facility]  08/22/23            Again, thank you for allowing me to participate in the care of your patient.      Sincerely,    Joe Bonner MD  Department of Ophthalmology and Visual Neurosciences  [facility]    CC: Jericho Lopez, DO  [address] [facility] [id number]  Via In Basket

## 2023-10-13 ENCOUNTER — OFFICE VISIT (OUTPATIENT)
Dept: FAMILY MEDICINE | Facility: CLINIC | Age: 43
End: 2023-10-13
Payer: COMMERCIAL

## 2023-10-13 VITALS
HEART RATE: 76 BPM | RESPIRATION RATE: 16 BRPM | BODY MASS INDEX: 29.25 KG/M2 | DIASTOLIC BLOOD PRESSURE: 70 MMHG | TEMPERATURE: 98.1 F | WEIGHT: 193 LBS | SYSTOLIC BLOOD PRESSURE: 114 MMHG | OXYGEN SATURATION: 99 % | HEIGHT: 68 IN

## 2023-10-13 DIAGNOSIS — Z00.00 ROUTINE GENERAL MEDICAL EXAMINATION AT A HEALTH CARE FACILITY: Primary | ICD-10-CM

## 2023-10-13 DIAGNOSIS — R40.0 HAS DAYTIME DROWSINESS: ICD-10-CM

## 2023-10-13 DIAGNOSIS — R51.9 MIXED HEADACHE: ICD-10-CM

## 2023-10-13 LAB
ALBUMIN SERPL BCG-MCNC: 4.2 G/DL (ref 3.5–5.2)
ALP SERPL-CCNC: 66 U/L (ref 35–104)
ALT SERPL W P-5'-P-CCNC: 20 U/L (ref 0–50)
ANION GAP SERPL CALCULATED.3IONS-SCNC: 7 MMOL/L (ref 7–15)
AST SERPL W P-5'-P-CCNC: 22 U/L (ref 0–45)
BILIRUB SERPL-MCNC: 0.2 MG/DL
BUN SERPL-MCNC: 9.2 MG/DL (ref 6–20)
CALCIUM SERPL-MCNC: 8.9 MG/DL (ref 8.6–10)
CHLORIDE SERPL-SCNC: 107 MMOL/L (ref 98–107)
CHOLEST SERPL-MCNC: 188 MG/DL
CREAT SERPL-MCNC: 0.67 MG/DL (ref 0.51–0.95)
DEPRECATED HCO3 PLAS-SCNC: 25 MMOL/L (ref 22–29)
EGFRCR SERPLBLD CKD-EPI 2021: >90 ML/MIN/1.73M2
ERYTHROCYTE [DISTWIDTH] IN BLOOD BY AUTOMATED COUNT: 11.5 % (ref 10–15)
FERRITIN SERPL-MCNC: 179 NG/ML (ref 6–175)
GLUCOSE SERPL-MCNC: 106 MG/DL (ref 70–99)
HCT VFR BLD AUTO: 39.3 % (ref 35–47)
HDLC SERPL-MCNC: 61 MG/DL
HGB BLD-MCNC: 13.1 G/DL (ref 11.7–15.7)
IRON BINDING CAPACITY (ROCHE): 282 UG/DL (ref 240–430)
IRON SATN MFR SERPL: 35 % (ref 15–46)
IRON SERPL-MCNC: 98 UG/DL (ref 37–145)
LDLC SERPL CALC-MCNC: 116 MG/DL
MCH RBC QN AUTO: 32 PG (ref 26.5–33)
MCHC RBC AUTO-ENTMCNC: 33.3 G/DL (ref 31.5–36.5)
MCV RBC AUTO: 96 FL (ref 78–100)
NONHDLC SERPL-MCNC: 127 MG/DL
PLATELET # BLD AUTO: 280 10E3/UL (ref 150–450)
POTASSIUM SERPL-SCNC: 4.1 MMOL/L (ref 3.4–5.3)
PROT SERPL-MCNC: 7.2 G/DL (ref 6.4–8.3)
RBC # BLD AUTO: 4.09 10E6/UL (ref 3.8–5.2)
SODIUM SERPL-SCNC: 139 MMOL/L (ref 135–145)
TRIGL SERPL-MCNC: 57 MG/DL
TSH SERPL DL<=0.005 MIU/L-ACNC: 1.63 UIU/ML (ref 0.3–4.2)
WBC # BLD AUTO: 7.7 10E3/UL (ref 4–11)

## 2023-10-13 PROCEDURE — 82728 ASSAY OF FERRITIN: CPT | Performed by: NURSE PRACTITIONER

## 2023-10-13 PROCEDURE — 80061 LIPID PANEL: CPT | Performed by: NURSE PRACTITIONER

## 2023-10-13 PROCEDURE — 80053 COMPREHEN METABOLIC PANEL: CPT | Performed by: NURSE PRACTITIONER

## 2023-10-13 PROCEDURE — 82306 VITAMIN D 25 HYDROXY: CPT | Performed by: NURSE PRACTITIONER

## 2023-10-13 PROCEDURE — 99214 OFFICE O/P EST MOD 30 MIN: CPT | Mod: 25 | Performed by: NURSE PRACTITIONER

## 2023-10-13 PROCEDURE — 83550 IRON BINDING TEST: CPT | Performed by: NURSE PRACTITIONER

## 2023-10-13 PROCEDURE — 36415 COLL VENOUS BLD VENIPUNCTURE: CPT | Performed by: NURSE PRACTITIONER

## 2023-10-13 PROCEDURE — 85027 COMPLETE CBC AUTOMATED: CPT | Performed by: NURSE PRACTITIONER

## 2023-10-13 PROCEDURE — 99396 PREV VISIT EST AGE 40-64: CPT | Performed by: NURSE PRACTITIONER

## 2023-10-13 PROCEDURE — 84443 ASSAY THYROID STIM HORMONE: CPT | Performed by: NURSE PRACTITIONER

## 2023-10-13 PROCEDURE — 83540 ASSAY OF IRON: CPT | Performed by: NURSE PRACTITIONER

## 2023-10-13 RX ORDER — SUMATRIPTAN 50 MG/1
50 TABLET, FILM COATED ORAL
COMMUNITY
Start: 2023-10-13 | End: 2024-02-06

## 2023-10-13 ASSESSMENT — ENCOUNTER SYMPTOMS
DYSURIA: 0
COUGH: 0
PALPITATIONS: 0
FREQUENCY: 0
CONSTIPATION: 0
ABDOMINAL PAIN: 0
WEAKNESS: 0
HEARTBURN: 0
PARESTHESIAS: 0
NERVOUS/ANXIOUS: 0
EYE PAIN: 0
SHORTNESS OF BREATH: 0
CHILLS: 0
BREAST MASS: 0
NAUSEA: 0
HEMATURIA: 0
ARTHRALGIAS: 0
HEMATOCHEZIA: 0
MYALGIAS: 0
DIZZINESS: 0
JOINT SWELLING: 0
DIARRHEA: 0
SORE THROAT: 0
HEADACHES: 0
FEVER: 0

## 2023-10-13 ASSESSMENT — PAIN SCALES - GENERAL: PAINLEVEL: NO PAIN (0)

## 2023-10-13 NOTE — PROGRESS NOTES
SUBJECTIVE:   CC: Ptatie is an 42 year old who presents for preventive health visit.       10/13/2023     1:09 PM   Additional Questions   Roomed by Elissa MARION       Healthy Habits:     Getting at least 3 servings of Calcium per day:  NO    Bi-annual eye exam:  NO    Dental care twice a year:  NO    Sleep apnea or symptoms of sleep apnea:  Daytime drowsiness    Diet:  Breakfast skipped    Frequency of exercise:  1 day/week    Duration of exercise:  30-45 minutes    Taking medications regularly:  No    Barriers to taking medications:  Other    Medication side effects:  None    Social History     Tobacco Use    Smoking status: Former     Packs/day: 0.10     Years: 14.00     Additional pack years: 0.00     Total pack years: 1.40     Types: Cigarettes    Smokeless tobacco: Former     Quit date: 10/26/2018    Tobacco comments:     2-3 cig/day   Substance Use Topics    Alcohol use: No     Comment: ETOH before she knew she was pregnant             10/13/2023     1:07 PM   Alcohol Use   Prescreen: >3 drinks/day or >7 drinks/week? No     Reviewed orders with patient.  Reviewed health maintenance and updated orders accordingly - Yes  Lab work is in process  Labs reviewed in EPIC  BP Readings from Last 3 Encounters:   10/13/23 114/70   10/21/22 115/67   10/14/22 108/66    Wt Readings from Last 3 Encounters:   10/13/23 87.5 kg (193 lb)   10/14/22 83 kg (183 lb)   07/22/22 83.4 kg (183 lb 14.4 oz)                  Patient Active Problem List   Diagnosis    Hypertrophy of breast    Bilateral occipital neuralgia    Excess skin of abdomen     Past Surgical History:   Procedure Laterality Date    ABDOMINOPLASTY, PANNICULECTOMY, COMBINED Bilateral 10/21/2022    Procedure: Cosmetic ABDOMINOPLASTY, Left Breast Dog Ear removal;  Surgeon: JUN Boland MD;  Location: [facility]    CYSTECTOMY PILONIDAL      HYSTERECTOMY, PAP NO LONGER INDICATED  2018    LASIK      LIPOSUCTION (LOCATION) Bilateral 10/21/2022    Procedure: Cosmetic  Liposuction of Abdomen and Flanks;  Surgeon: JUN Boland MD;  Location: [facility]    MAMMOPLASTY REDUCTION BILATERAL Bilateral 11/05/2021    Procedure: MAMMOPLASTY, REDUCTION, BILATERAL;  Surgeon: JUN Boland MD;  Location: [facility]    NERVE BLOCK OCCIPITAL Bilateral 02/10/2022    Procedure: BLOCK, NERVE, OCCIPITAL;  Surgeon: Gómez Oliver MD;  Location: [facility]       Social History     Tobacco Use    Smoking status: Former     Packs/day: 0.10     Years: 14.00     Additional pack years: 0.00     Total pack years: 1.40     Types: Cigarettes    Smokeless tobacco: Former     Quit date: 10/26/2018    Tobacco comments:     2-3 cig/day   Substance Use Topics    Alcohol use: No     Comment: ETOH before she knew she was pregnant     Family History   Problem Relation Age of Onset    Alcohol/Drug Father     Hypertension Maternal Grandmother     Alcohol/Drug Sister     Glaucoma No family hx of     Macular Degeneration No family hx of          Current Outpatient Medications   Medication Sig Dispense Refill    SUMAtriptan (IMITREX) 50 MG tablet Take 1 tablet (50 mg) by mouth at onset of headache for migraine May repeat in 2 hours. Max 4 tablets/24 hours.      VITAMIN D, CHOLECALCIFEROL, PO Take by mouth daily       No Known Allergies  Recent Labs   Lab Test 03/22/21  1711   ALT 24   CR 0.61   GFRESTIMATED >90   GFRESTBLACK >90   POTASSIUM 3.8   TSH 1.42        Breast Cancer Screening:        3/22/2021     4:43 PM   Breast CA Risk Assessment (FHS-7)   Do you have a family history of breast, colon, or ovarian cancer? No / Unknown       click delete button to remove this line now  Mammogram Screening: Recommended annual mammography  Pertinent mammograms are reviewed under the imaging tab.        6/29/2011     9:15 AM   PAP / HPV   PAP (Historical) NIL      Reviewed and updated as needed this visit by clinical staff                  Reviewed and updated as needed this visit by Provider                 Past Medical  History:   Diagnosis Date    NO ACTIVE PROBLEMS       Past Surgical History:   Procedure Laterality Date    ABDOMINOPLASTY, PANNICULECTOMY, COMBINED Bilateral 10/21/2022    Procedure: Cosmetic ABDOMINOPLASTY, Left Breast Dog Ear removal;  Surgeon: JUN Boland MD;  Location: [facility]    CYSTECTOMY PILONIDAL      HYSTERECTOMY, PAP NO LONGER INDICATED  2018    LASIK      LIPOSUCTION (LOCATION) Bilateral 10/21/2022    Procedure: Cosmetic Liposuction of Abdomen and Flanks;  Surgeon: JUN Boland MD;  Location: [facility]    MAMMOPLASTY REDUCTION BILATERAL Bilateral 2021    Procedure: MAMMOPLASTY, REDUCTION, BILATERAL;  Surgeon: JUN Boland MD;  Location: [facility]    NERVE BLOCK OCCIPITAL Bilateral 02/10/2022    Procedure: BLOCK, NERVE, OCCIPITAL;  Surgeon: Gómez Oliver MD;  Location: [facility]     OB History    Para Term  AB Living   2 1 1 0 0 1   SAB IAB Ectopic Multiple Live Births   0 0 0 0 1      # Outcome Date GA Lbr Tremaine/2nd Weight Sex Delivery Anes PTL Lv   2 Term 07 40w0d 15:00 3.884 kg (8 lb 9 oz) F    MIKKI      Birth Comments: Uncomplicated       Name: Erick York                Birth Comments: System Generated. Please review and update pregnancy details.      Obstetric Comments   EDC 2012  Based on LMP.  Happy to be pregnant.       Review of Systems   Constitutional:  Negative for chills and fever.   HENT:  Negative for congestion, ear pain, hearing loss and sore throat.    Eyes:  Negative for pain and visual disturbance.   Respiratory:  Negative for cough and shortness of breath.    Cardiovascular:  Negative for chest pain, palpitations and peripheral edema.   Gastrointestinal:  Negative for abdominal pain, constipation, diarrhea, heartburn, hematochezia and nausea.   Breasts:  Negative for tenderness, breast mass and discharge.   Genitourinary:  Negative for dysuria, frequency, genital sores, hematuria, pelvic pain, urgency, vaginal bleeding  "and vaginal discharge.   Musculoskeletal:  Negative for arthralgias, joint swelling and myalgias.   Skin:  Negative for rash.   Neurological:  Negative for dizziness, weakness, headaches and paresthesias.   Psychiatric/Behavioral:  Negative for mood changes. The patient is not nervous/anxious.           OBJECTIVE:   LMP  (LMP Unknown) /70 (BP Location: Right arm, Cuff Size: Adult Regular)   Pulse 76   Temp 98.1  F (36.7  C) (Tympanic)   Resp 16   Ht 1.734 m (5' 8.25\")   Wt 87.5 kg (193 lb)   LMP  (LMP Unknown)   SpO2 99%   BMI 29.13 kg/m     Physical Exam  GENERAL: healthy, alert and no distress  EYES: Eyes grossly normal to inspection, PERRL and conjunctivae and sclerae normal  HENT: ear canals and TM's normal, nose and mouth without ulcers or lesions  NECK: no adenopathy, no asymmetry, masses, or scars and thyroid normal to palpation  RESP: lungs clear to auscultation - no rales, rhonchi or wheezes  CV: regular rate and rhythm, normal S1 S2, no S3 or S4, no murmur, click or rub, no peripheral edema and peripheral pulses strong  ABDOMEN: soft, nontender, no hepatosplenomegaly, no masses and bowel sounds normal  MS: no gross musculoskeletal defects noted, no edema  SKIN: no suspicious lesions or rashes  NEURO: Normal strength and tone, mentation intact and speech normal  PSYCH: mentation appears normal, affect normal/bright    Diagnostic Test Results:  Labs reviewed in Epic  Results for orders placed or performed in visit on 10/13/23   TSH with free T4 reflex     Status: Normal   Result Value Ref Range    TSH 1.63 0.30 - 4.20 uIU/mL   CBC with platelets     Status: Normal   Result Value Ref Range    WBC Count 7.7 4.0 - 11.0 10e3/uL    RBC Count 4.09 3.80 - 5.20 10e6/uL    Hemoglobin 13.1 11.7 - 15.7 g/dL    Hematocrit 39.3 35.0 - 47.0 %    MCV 96 78 - 100 fL    MCH 32.0 26.5 - 33.0 pg    MCHC 33.3 31.5 - 36.5 g/dL    RDW 11.5 10.0 - 15.0 %    Platelet Count 280 150 - 450 10e3/uL   Vitamin D Deficiency  "    Status: Normal   Result Value Ref Range    Vitamin D, Total (25-Hydroxy) 27 20 - 50 ng/mL    Narrative    Season, race, dietary intake, and treatment affect the concentration of 25-hydroxy-Vitamin D. Values may decrease during winter months and increase during summer months.    Vitamin D determination is routinely performed by an immunoassay specific for 25 hydroxyvitamin D3.  If an individual is on vitamin D2(ergocalciferol) supplementation, please specify 25 OH vitamin D2 and D3 level determination by LCMSMS test [id number].     Ferritin     Status: Abnormal   Result Value Ref Range    Ferritin 179 (H) 6 - 175 ng/mL   Iron and iron binding capacity     Status: Normal   Result Value Ref Range    Iron 98 37 - 145 ug/dL    Iron Binding Capacity 282 240 - 430 ug/dL    Iron Sat Index 35 15 - 46 %   Comprehensive metabolic panel (BMP + Alb, Alk Phos, ALT, AST, Total. Bili, TP)     Status: Abnormal   Result Value Ref Range    Sodium 139 135 - 145 mmol/L    Potassium 4.1 3.4 - 5.3 mmol/L    Carbon Dioxide (CO2) 25 22 - 29 mmol/L    Anion Gap 7 7 - 15 mmol/L    Urea Nitrogen 9.2 6.0 - 20.0 mg/dL    Creatinine 0.67 0.51 - 0.95 mg/dL    GFR Estimate >90 >60 mL/min/1.73m2    Calcium 8.9 8.6 - 10.0 mg/dL    Chloride 107 98 - 107 mmol/L    Glucose 106 (H) 70 - 99 mg/dL    Alkaline Phosphatase 66 35 - 104 U/L    AST 22 0 - 45 U/L    ALT 20 0 - 50 U/L    Protein Total 7.2 6.4 - 8.3 g/dL    Albumin 4.2 3.5 - 5.2 g/dL    Bilirubin Total 0.2 <=1.2 mg/dL   Lipid panel reflex to direct LDL Fasting     Status: Abnormal   Result Value Ref Range    Cholesterol 188 <200 mg/dL    Triglycerides 57 <150 mg/dL    Direct Measure HDL 61 >=50 mg/dL    LDL Cholesterol Calculated 116 (H) <=100 mg/dL    Non HDL Cholesterol 127 <130 mg/dL    Narrative    Cholesterol  Desirable:  <200 mg/dL    Triglycerides  Normal:  Less than 150 mg/dL  Borderline High:  150-199 mg/dL  High:  200-499 mg/dL  Very High:  Greater than or equal to 500 mg/dL    Direct  Measure HDL  Female:  Greater than or equal to 50 mg/dL   Male:  Greater than or equal to 40 mg/dL    LDL Cholesterol  Desirable:  <100mg/dL  Above Desirable:  100-129 mg/dL   Borderline High:  130-159 mg/dL   High:  160-189 mg/dL   Very High:  >= 190 mg/dL    Non HDL Cholesterol  Desirable:  130 mg/dL  Above Desirable:  130-159 mg/dL  Borderline High:  160-189 mg/dL  High:  190-219 mg/dL  Very High:  Greater than or equal to 220 mg/dL       ASSESSMENT/PLAN:     Problem List Items Addressed This Visit    None  Visit Diagnoses       Routine general medical examination at a health care facility    -  Primary    Relevant Orders    TSH with free T4 reflex (Completed)    CBC with platelets (Completed)    Vitamin D Deficiency (Completed)    Ferritin (Completed)    Iron and iron binding capacity (Completed)    Comprehensive metabolic panel (BMP + Alb, Alk Phos, ALT, AST, Total. Bili, TP) (Completed)    Lipid panel reflex to direct LDL Fasting (Completed)    Mixed headache        Relevant Medications    SUMAtriptan (IMITREX) 50 MG tablet    Other Relevant Orders    OFFICE/OUTPT VISIT,EST,LEVL IV (Completed)    Has daytime drowsiness        Relevant Orders    Adult Sleep Eval & Management  Referral    OFFICE/OUTPT VISIT,EST,LEVL IV (Completed)            Patient has been advised of split billing requirements and indicates understanding: Yes      COUNSELING:  Reviewed preventive health counseling, as reflected in patient instructions       Regular exercise       Healthy diet/nutrition       Vision screening       Hearing screening       Alcohol Use       Contraception       Family planning       Folic Acid       Colorectal Cancer Screening       Consider Hep C screening for all patients one time for ages 18-79 years       HIV screeninx in teen years, 1x in adult years, and at intervals if high risk       ([place])menopause management      BMI:   Estimated body mass index is 28.24 kg/m  as calculated from the  "following:    Height as of 10/14/22: 1.715 m (5' 7.5\").    Weight as of 10/14/22: 83 kg (183 lb).         She reports that she has quit smoking. Her smoking use included cigarettes. She has a 1.40 pack-year smoking history. She quit smokeless tobacco use about 4 years ago.          ALFREDO Tapia CNP  [facility]  "

## 2023-10-14 LAB — VIT D+METAB SERPL-MCNC: 27 NG/ML (ref 20–50)

## 2024-01-02 ASSESSMENT — SLEEP AND FATIGUE QUESTIONNAIRES
HOW LIKELY ARE YOU TO NOD OFF OR FALL ASLEEP WHILE SITTING AND TALKING TO SOMEONE: WOULD NEVER DOZE
HOW LIKELY ARE YOU TO NOD OFF OR FALL ASLEEP IN A CAR, WHILE STOPPED FOR A FEW MINUTES IN TRAFFIC: WOULD NEVER DOZE
HOW LIKELY ARE YOU TO NOD OFF OR FALL ASLEEP WHILE WATCHING TV: SLIGHT CHANCE OF DOZING
HOW LIKELY ARE YOU TO NOD OFF OR FALL ASLEEP WHILE SITTING QUIETLY AFTER LUNCH WITHOUT ALCOHOL: SLIGHT CHANCE OF DOZING
HOW LIKELY ARE YOU TO NOD OFF OR FALL ASLEEP WHILE SITTING AND READING: WOULD NEVER DOZE
HOW LIKELY ARE YOU TO NOD OFF OR FALL ASLEEP WHILE SITTING INACTIVE IN A PUBLIC PLACE: MODERATE CHANCE OF DOZING
HOW LIKELY ARE YOU TO NOD OFF OR FALL ASLEEP WHEN YOU ARE A PASSENGER IN A CAR FOR AN HOUR WITHOUT A BREAK: WOULD NEVER DOZE
HOW LIKELY ARE YOU TO NOD OFF OR FALL ASLEEP WHILE LYING DOWN TO REST IN THE AFTERNOON WHEN CIRCUMSTANCES PERMIT: SLIGHT CHANCE OF DOZING

## 2024-01-09 ENCOUNTER — OFFICE VISIT (OUTPATIENT)
Dept: SLEEP MEDICINE | Facility: CLINIC | Age: 44
End: 2024-01-09
Attending: NURSE PRACTITIONER
Payer: COMMERCIAL

## 2024-01-09 VITALS
SYSTOLIC BLOOD PRESSURE: 95 MMHG | HEIGHT: 68 IN | HEART RATE: 71 BPM | DIASTOLIC BLOOD PRESSURE: 67 MMHG | OXYGEN SATURATION: 97 % | RESPIRATION RATE: 12 BRPM | WEIGHT: 192.8 LBS | BODY MASS INDEX: 29.22 KG/M2

## 2024-01-09 DIAGNOSIS — R06.83 SNORING: ICD-10-CM

## 2024-01-09 DIAGNOSIS — R40.0 DAYTIME SLEEPINESS: ICD-10-CM

## 2024-01-09 DIAGNOSIS — R06.81 WITNESSED APNEIC SPELLS: Primary | ICD-10-CM

## 2024-01-09 PROCEDURE — 99204 OFFICE O/P NEW MOD 45 MIN: CPT

## 2024-01-09 NOTE — NURSING NOTE
"Chief Complaint   Patient presents with    Sleep Problem     Here for a consult for being tired all the time.       Initial BP 95/67   Pulse 71   Resp 12   Ht 1.727 m (5' 8\")   Wt 87.5 kg (192 lb 12.8 oz)   LMP  (LMP Unknown)   SpO2 97%   BMI 29.32 kg/m   Estimated body mass index is 29.32 kg/m  as calculated from the following:    Height as of this encounter: 1.727 m (5' 8\").    Weight as of this encounter: 87.5 kg (192 lb 12.8 oz).    Medication Reconciliation: complete  ESS: 5  Neck circumference: 14.25 inches / 36 centimeters.  DME: N/A  Natali Posada CMA      "

## 2024-01-09 NOTE — NURSING NOTE
HST pick-up, HST drop-off, and follow-up appointment with provider have all been scheduled.  Natali Posada, CMA
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
## 2024-01-09 NOTE — PATIENT INSTRUCTIONS
"          MY TREATMENT INFORMATION FOR SLEEP APNEA-  Pattie Easton    DOCTOR : ALFREDO Butler CNP    Am I having a sleep study at a sleep center?  --->Due to normal delays, you will be contacted within 2-4 weeks to schedule    Am I having a home sleep study?  --->Watch the video for the device you are using:    -/drop off device- https://www.[employer].com/watch?v=yGGFBdELGhk    Frequently asked questions:  1. What is Obstructive Sleep Apnea (MOHIT)? MOHIT is the most common type of sleep apnea. Apnea means, \"without breath.\"  Apnea is most often caused by narrowing or collapse of the upper airway as muscles relax during sleep.   Almost everyone has occasional apneas. Most people with sleep apnea have had brief interruptions at night frequently for many years.  The severity of sleep apnea is related to how frequent and severe the events are.   2. What are the consequences of MOHIT? Symptoms include: feeling sleepy during the day, snoring loudly, gasping or stopping of breathing, trouble sleeping, and occasionally morning headaches or heartburn at night.  Sleepiness can be serious and even increase the risk of falling asleep while driving. Other health consequences may include development of high blood pressure and other cardiovascular disease in persons who are susceptible. Untreated MOHIT can contribute to heart disease, stroke and diabetes.   3. What are the treatment options? In most situations, sleep apnea is a lifelong disease that must be managed with daily therapy. Medications are not effective for sleep apnea and surgery is generally not considered until other therapies have been tried. Your treatment is your choice . Continuous Positive Airway (CPAP) works right away and is the therapy that is effective in nearly everyone. An oral device to hold your jaw forward is usually the next most reliable option. Other options include postioning devices (to keep you off your back), weight loss, and surgery " including a tongue pacing device. There is more detail about some of these options below.  4. Are my sleep studies covered by insurance? Although we will request verification of coverage, we advise you also check in advance of the study to ensure there is coverage.    Important tips for those choosing CPAP and similar devices  For new devices, sign up for device TRUMAN to monitor your device for your followup visits  We encourage you to utilize the [employer] truman or website ([employer] ([employer]) to monitor your therapy progress and share the data with your healthcare team when you discuss your sleep apnea.                                                    Know your equipment:  CPAP is continuous positive airway pressure that prevents obstructive sleep apnea by keeping the throat from collapsing while you are sleeping. In most cases, the device is  smart  and can slowly self-adjusts if your throat collapses and keeps a record every day of how well you are treated-this information is available to you and your care team.  BPAP is bilevel positive airway pressure that keeps your throat open and also assists each breath with a pressure boost to maintain adequate breathing.  Special kinds of BPAP are used in patients who have inadequate breathing from lung or heart disease. In most cases, the device is  smart  and can slowly self-adjusts to assist breathing. Like CPAP, the device keeps a record of how well you are treated.  Your mask is your connection to the device. You get to choose what feels most comfortable and the staff will help to make sure if fits. Here: are some examples of the different masks that are available: Magnetic mask aids may assist with use but there are safety issues that should be addressed when considering with magnets* (see end of discussion).       Key points to remember on your journey with sleep apnea:  Sleep study.  PAP devices often need to be adjusted during a sleep study to show that  they are effective and adjusted right.  Good tips to remember: Try wearing just the mask during a quiet time during the day so your body adapts to wearing it. A humidifier is recommended for comfort in most cases to prevent drying of your nose and throat. Allergy medication from your provider may help you if you are having nasal congestion.  Getting settled-in. It takes more than one night for most of us to get used to wearing a mask. Try wearing just the mask during a quiet time during the day so your body adapts to wearing it. A humidifier is recommended for comfort in most cases. Our team will work with you carefully on the first day and will be in contact within 4 days and again at 2 and 4 weeks for advice and remote device adjustments. Your therapy is evaluated by the device each day.   Use it every night. The more you are able to sleep naturally for 7-8 hours, the more likely you will have good sleep and to prevent health risks or symptoms from sleep apnea. Even if you use it 4 hours it helps. Occasionally all of us are unable to use a medical therapy, in sleep apnea, it is not dangerous to miss one night.   Communicate. Call our skilled team on the number provided on the first day if your visit for problems that make it difficult to wear the device. Over 2 out of 3 patients can learn to wear the device long-term with help from our team. Remember to call our team or your sleep providers if you are unable to wear the device as we may have other solutions for those who cannot adapt to mask CPAP therapy. It is recommended that you sleep your sleep provider within the first 3 months and yearly after that if you are not having problems.   Use it for your health. We encourage use of CPAP masks during daytime quiet periods to allow your face and brain to adapt to the sensation of CPAP so that it will be a more natural sensation to awaken to at night or during naps. This can be very useful during the first few weeks  or months of adapting to CPAP though it does not help medically to wear CPAP during wakefulness and  should not be used as a strategy just to meet guidelines.  Take care of your equipment. Make sure you clean your mask and tubing using directions every day and that your filter and mask are replaced as recommended or if they are not working.     *Masks with magnets:  Updated Contraindications  Masks with magnetic components are contraindicated for use by patients where they, or anyone in close physical contact while using the mask, have the following:   Active medical implants that interact with magnets (i.e., pacemakers, implantable cardioverter defibrillators (ICD), neurostimulators, cerebrospinal fluid (CSF) shunts, insulin/infusion pumps)   Metallic implants/objects containing ferromagnetic material (i.e., aneurysm clips/flow disruption devices, embolic coils, stents, valves, electrodes, implants to restore hearing or balance with implanted magnets, ocular implants, metallic splinters in the eye)  Updated Warning  Keep the mask magnets at a safe distance of at least 6 inches (150 mm) away from implants or medical devices that may be adversely affected by magnetic interference. This warning applies to you or anyone in close physical contact with your mask. The magnets are in the frame and lower headgear clips, with a magnetic field strength of up to 400mT. When worn, they connect to secure the mask but may inadvertently detach while asleep.  Implants/medical devices, including those listed within contraindications, may be adversely affected if they change function under external magnetic fields or contain ferromagnetic materials that attract/repel to magnetic fields (some metallic implants, e.g., contact lenses with metal, dental implants, metallic cranial plates, screws, kezia hole covers, and bone substitute devices). Consult your physician and  of your implant / other medical device for information  on the potential adverse effects of magnetic fields.    BESIDES CPAP, WHAT OTHER THERAPIES ARE THERE?    Positioning Device  Positioning devices are generally used when sleep apnea is mild and only occurs on your back.This example shows a pillow that straps around the waist. It may be appropriate for those whose sleep study shows milder sleep apnea that occurs primarily when lying flat on one's back. Preliminary studies have shown benefit but effectiveness at home may need to be verified by a home sleep test. These devices are generally not covered by medical insurance.  Examples of devices that maintain sleeping on the back to prevent snoring and mild sleep apnea.    Belt type body positioner  http://[employer]/    Electronic reminder  http://nightshifttherapy.com/            Oral Appliance  What is oral appliance therapy?  An oral appliance device fits on your teeth at night like a retainer used after having braces. The device is made by a specialized dentist and requires several visits over 1-2 months before a manufactured device is made to fit your teeth and is adjusted to prevent your sleep apnea. Once an oral device is working properly, snoring should be improved. A home sleep test may be recommended at that time if to determine whether the sleep apnea is adequately treated.       Some things to remember:  -Oral devices are often, but not always, covered by your medical insurance. Be sure to check with your insurance provider.   -If you are referred for oral therapy, you will be given a list of specialized dentists to consider or you may choose to visit the Web site of the American Academy of Dental Sleep Medicine  -Oral devices are less likely to work if you have severe sleep apnea or are extremely overweight.     More detailed information  An oral appliance is a small acrylic device that fits over the upper and lower teeth  (similar to a retainer or a mouth guard). This device slightly moves jaw forward,  which moves the base of the tongue forward, opens the airway, improves breathing for effective treat snoring and obstructive sleep apnea in perhaps 7 out of 10 people .  The best working devices are custom-made by a dental device  after a mold is made of the teeth 1, 2, 3.  When is an oral appliance indicated?  Oral appliance therapy is recommended as a first-line treatment for patients with primary snoring, mild sleep apnea, and for patients with moderate sleep apnea who prefer appliance therapy to use of CPAP4, 5. Severity of sleep apnea is determined by sleep testing and is based on the number of respiratory events per hour of sleep.   How successful is oral appliance therapy?  The success rate of oral appliance therapy in patients with mild sleep apnea is 75-80% while in patients with moderate sleep apnea it is 50-70%. The chance of success in patients with severe sleep apnea is 40-50%. The research also shows that oral appliances have a beneficial effect on the cardiovascular health of MOHIT patients at the same magnitude as CPAP therapy7.  Oral appliances should be a second-line treatment in cases of severe sleep apnea, but if not completely successful then a combination therapy utilizing CPAP plus oral appliance therapy may be effective. Oral appliances tend to be effective in a broad range of patients although studies show that the patients who have the highest success are females, younger patients, those with milder disease, and less severe obesity. 3, 6.   Finding a dentist that practices dental sleep medicine  Specific training is available through the American Academy of Dental Sleep Medicine for dentists interested in working in the field of sleep. To find a dentist who is educated in the field of sleep and the use of oral appliances, near you, visit the Web site of the American Academy of Dental Sleep Medicine.    References  1. supriya Yarbrough al. Objectively measured vs self-reported  compliance during oral appliance therapy for sleep-disordered breathing. Chest 2013; 144(5): 2790-5264.  2. Nu, et al. Objective measurement of compliance during oral appliance therapy for sleep-disordered breathing. Thorax 2013; 68(1): 91-96.  3. Percy et al. Mandibular advancement devices in 620 men and women with MOHIT and snoring: tolerability and predictors of treatment success. Chest 2004; 125: 2594-9230.  4. Terri et al. Oral appliances for snoring and MOHIT: a review. Sleep 2006; 29: 244-262.  5. Saloni et al. Oral appliance treatment for MOHIT: an update. J Clin Sleep Med 2014; 10(2): 215-227.  6. Tom et al. Predictors of OSAH treatment outcome. J Dent Res 2007; 86: 1514-7646.      Weight Loss:    Weight loss is a long-term strategy that may improve sleep apnea in some patients.    Weight management is a personal decision and the decision should be based on your interest and the potential benefits.  If you are interested in exploring weight loss strategies, the following discussion covers the impact on weight loss on sleep apnea and the approaches that may be successful.    Being overweight does not necessarily mean you will have health consequences.  Those who have BMI over 35 or over 27 with existing medical conditions carries greater risk.   Weight loss decreases severity of sleep apnea in most people with obesity. For those with mild obesity who have developed snoring with weight gain, even 15-30 pound weight loss can improve and occasionally eliminate sleep apnea.  Structured and life-long dietary and health habits are necessary to lose weight and keep healthier weight levels.     Though there may be significant health benefits from weight loss, long-term weight loss is very difficult to achieve- studies show success with dietary management in less than 10% of people. In addition, substantial weight loss may require years of dietary control and may be difficult if patients have  severe obesity. In these cases, surgical management may be considered.  Finally, older individuals who have tolerated obesity without health complications may be less likely to benefit from weight loss strategies.      BMI 29    Surgery:    Surgery for obstructive sleep apnea is considered generally only when other therapies fail to work. Surgery may be discussed with you if you are having a difficult time tolerating CPAP and or when there is an abnormal structure that requires surgical correction.  Nose and throat surgeries often enlarge the airway to prevent collapse.  Most of these surgeries create pain for 1-2 weeks and up to half of the most common surgeries are not effective throughout life.  You should carefully discuss the benefits and drawbacks to surgery with your sleep provider and surgeon to determine if it is the best solution for you.   More information  Surgery for MOHIT is directed at areas that are responsible for narrowing or complete obstruction of the airway during sleep.  There are a wide range of procedures available to enlarge and/or stabilize the airway to prevent blockage of breathing in the three major areas where it can occur: the palate, tongue, and nasal regions.  Successful surgical treatment depends on the accurate identification of the factors responsible for obstructive sleep apnea in each person.  A personalized approach is required because there is no single treatment that works well for everyone.  Because of anatomic variation, consultation with an examination by a sleep surgeon is a critical first step in determining what surgical options are best for each patient.  In some cases, examination during sedation may be recommended in order to guide the selection of procedures.  Patients will be counseled about risks and benefits as well as the typical recovery course after surgery. Surgery is typically not a cure for a person s MOHIT.  However, surgery will often significantly improve  one s MOHIT severity (termed  success rate ).  Even in the absence of a cure, surgery will decrease the cardiovascular risk associated with OSA7; improve overall quality of life8 (sleepiness, functionality, sleep quality, etc).  Palate Procedures:  Patients with MOHIT often have narrowing of their airway in the region of their tonsils and uvula.  The goals of palate procedures are to widen the airway in this region as well as to help the tissues resist collapse.  Modern palate procedure techniques focus on tissue conservation and soft tissue rearrangement, rather than tissue removal.  Often the uvula is preserved in this procedure. Residual sleep apnea is common in patient after pharyngoplasty with an average reduction in sleep apnea events of 33%2.    Tongue Procedures:  Exam while patients are awake, the muscles that surround the throat are active and keep this region open for breathing. These muscles relax during sleep, allowing the tongue and other structures to collapse and block breathing.  There are several different tongue procedures available.  Selection of a tongue base procedure depends on characteristics seen on physical exam.  Generally, procedures are aimed at removing bulky tissues in this area or preventing the back of the tongue from falling back during sleep.  Success rates for tongue surgery range from 50-62%3.  Hypoglossal Nerve Stimulation:  Hypoglossal nerve stimulation has recently received approval from the [place] Food and Drug Administration for the treatment of obstructive sleep apnea.  This is based on research showing that the system was safe and effective in treating sleep apnea6.  Results showed that the median AHI score decreased 68%, from 29.3 to 9.0. This therapy uses an implant system that senses breathing patterns and delivers mild stimulation to airway muscles, which keeps the airway open during sleep.  The system consists of three fully implanted components: a small generator  (similar in size to a pacemaker), a breathing sensor, and a stimulation lead.  Using a small handheld remote, a patient turns the therapy on before bed and off upon awakening.    Candidates for this device must be greater than 18 years of age, have moderate to severe MOHIT (AHI between 15-65), BMI less than 35, have tried CPAP/oral appliance for at least 8 weeks without success, and have appropriate upper airway anatomy (determined by a sleep endoscopy performed by Dr. Chris Calero).  Hypoglossal Nerve Stimulation Pathway:    The sleep surgeon s office will work with the patient through the insurance prior-authorization process (including communications and appeals).    Nasal Procedures:  Nasal obstruction can interfere with nasal breathing during the day and night.  Studies have shown that relief of nasal obstruction can improve the ability of some patients to tolerate positive airway pressure therapy for obstructive sleep apnea1.  Treatment options include medications such as nasal saline, topical corticosteroid and antihistamine sprays, and oral medications such as antihistamines or decongestants. Non-surgical treatments can include external nasal dilators for selected patients. If these are not successful by themselves, surgery can improve the nasal airway either alone or in combination with these other options.  Combination Procedures:  Combination of surgical procedures and other treatments may be recommended, particularly if patients have more than one area of narrowing or persistent positional disease.  The success rate of combination surgery ranges from 66-80%2,3.    References  Veronica HOSKINS. The Role of the Nose in Snoring and Obstructive Sleep Apnoea: An Update.  Eur Arch Otorhinolaryngol. 2011; 268: 1365-73.   Moni SM; Sheree JA; Yoselyn JR; Pallanch JF; Mike VAN; Gavin BAEZ; Evan RAI. Surgical modifications of the upper airway for obstructive sleep apnea in adults: a systematic review and meta-analysis.  SLEEP 2010;33(10):0483-0083. Pauly HERNÁNDEZ. Hypopharyngeal surgery in obstructive sleep apnea: an evidence-based medicine review.  Arch Otolaryngol Head Neck Surg. 2006 Feb;132(2):206-13.  Miguel YAKSHAT, Trista Y, Rudy FERNANDEZ. The efficacy of anatomically based multilevel surgery for obstructive sleep apnea. Otolaryngol Head Neck Surg. 2003 Oct;129(4):327-35.  Kezirian E, Goldberg A. Hypopharyngeal Surgery in Obstructive Sleep Apnea: An Evidence-Based Medicine Review. Arch Otolaryngol Head Neck Surg. 2006 Feb;132(2):206-13.  Nat PJ et al. Upper-Airway Stimulation for Obstructive Sleep Apnea.  N Engl J Med. 2014 Jan 9;370(2):139-49.  Roney Y et al. Increased Incidence of Cardiovascular Disease in Middle-aged Men with Obstructive Sleep Apnea. Am J Respir Crit Care Med; 2002 166: 159-165  Galvez EM et al. Studying Life Effects and Effectiveness of Palatopharyngoplasty (SLEEP) study: Subjective Outcomes of Isolated Uvulopalatopharyngoplasty. Otolaryngol Head Neck Surg. 2011; 144: 623-631.  WHAT IF I ONLY HAVE SNORING?  Mandibular advancement devices, lateral sleep positioning, long-term weight loss and treatment of nasal allergies have been shown to improve snoring.  Exercising tongue muscles with a game (https://[employer]/us/truman/soundly-reduce-snoring/mr8575236939) or stimulating the tongue during the day with a device (https://doi.org/10.3390/wnj81283748) have improved snoring in some individuals.    Remember to Drive Safe... Drive Alive     Sleep health profoundly affects your health, mood, and your safety.  Thirty three percent of the population (one in three of us) is not getting enough sleep and many have a sleep disorder. Not getting enough sleep or having an untreated / undertreated sleep condition may make us sleepy without even knowing it. In fact, our driving could be dramatically impaired due to our sleep health. As your provider, here are some things I would like you to know about driving:     Here are some  warning signs for impairment and dangerous drowsy driving:              -Having been awake more than 16 hours               -Looking tired               -Eyelid drooping              -Head nodding (it could be too late at this point)              -Driving for more than 30 minutes     Some things you could do to make the driving safer if you are experiencing some drowsiness:              -Stop driving and rest              -Call for transportation              -Make sure your sleep disorder is adequately treated     Some things that have been shown NOT to work when experiencing drowsiness while driving:              -Turning on the radio              -Opening windows              -Eating any  distracting  /  entertaining  foods (e.g., sunflower seeds, candy, or any other)              -Talking on the phone      Your decision may not only impact your life, but also the life of others. Please, remember to drive safe for yourself and all of us.

## 2024-01-31 NOTE — PROGRESS NOTES
[facility]  Section of General Neurology  Return Patient  Virtual Visit    Pattie Easton MRN# [id number]   Age: 43 year old YOB: 1980            Assessment and Plan:   Assessment:  Pattie Easton is a pleasant 43 year old female seen in follow up for mixed headaches.  They are migrainous in nature at times but also provoked by neck pain and with elements of R>L occipital neuralgia as well.   Overall she feels better than previously but still not quite where she would want to be ideally.  Discussed options.  The neck pain is most limiting and where she describes the pain seems to represent occipital neuralgia most prominently.   She previously had a modest response to an injection and would like to re trial this to see if her symptoms can be improved further. Not to the point where she would like to re try a medication.   Plan as below, all questions answered.      Plan:  Agree with trial of acupuncture, reach out if a script would help  Continue imitrex 50 mg PRN  To discussion will not start a daily medication again  Referral to repeat previously helpful bilateral occiptial nerve blocks  To follow up in 1 year sooner with any issues questions or changes      Varinder Canseco MD   of Neurology   [facility]      Interval history:     Now following with sleep medicine---in work up for sleep apnea.   CTA--as below demonstrates likely infundibulum no clear aneurysm.    Headaches--touch and go.    Not as consistent.  Still improved since when we met.    Neck tension remains a big variable.  Still doing stretch and massage.    Kaleidoscope vision at times too.    Keeps imitrex on her at work/at home/ all over just in case.    Discussed repeating occipital nerve block.  She feels maybe it is worsened to the point where she needs it.    Worse on right but present on left too  Previous nerve blocks helped.    To look into if insurance would  cover it.    Is crocheting now and hasn't worsened neck tightness which is good news.   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    Not to point she wants to try daily medications    A/P at previous visit  Pattie Easton is a pleasant 42 year old female seen in follow up for mixed headaches.  She is doing much better and is only requiring imitrex PRN.  Due to previously significantly worsening headaches we had obtained MRI/MRA.  MRI was unrevealing but MRA showed possible small aneursyms.  Will obtain CTA to follow up on these findings.  Overall I am pleased with her progress.  All questions answered     Plan:  CTA head and neck  Continue imitrex 50 mg PRN, refilled  Follow up with me in 1 year        Varinder Canseco MD   of Neurology   [facility]      Past Medical History:     Patient Active Problem List   Diagnosis    Hypertrophy of breast    Bilateral occipital neuralgia    Excess skin of abdomen     Past Medical History:   Diagnosis Date    NO ACTIVE PROBLEMS         Past Surgical History:     Past Surgical History:   Procedure Laterality Date    ABDOMINOPLASTY, PANNICULECTOMY, COMBINED Bilateral 10/21/2022    Procedure: Cosmetic ABDOMINOPLASTY, Left Breast Dog Ear removal;  Surgeon: JUN Boland MD;  Location: [facility] OR    CYSTECTOMY PILONIDAL      HYSTERECTOMY, PAP NO LONGER INDICATED  2018    LASIK      LIPOSUCTION (LOCATION) Bilateral 10/21/2022    Procedure: Cosmetic Liposuction of Abdomen and Flanks;  Surgeon: JUN Boland MD;  Location: [facility] OR    MAMMOPLASTY REDUCTION BILATERAL Bilateral 11/05/2021    Procedure: MAMMOPLASTY, REDUCTION, BILATERAL;  Surgeon: JUN Boland MD;  Location: [facility] OR    NERVE BLOCK OCCIPITAL Bilateral 02/10/2022    Procedure: BLOCK, NERVE, OCCIPITAL;  Surgeon: Gómez Oliver MD;  Location: [facility] OR        Social History:     Social History     Tobacco Use     Smoking status: Former     Packs/day: 0.10     Years: 14.00     Additional pack years: 0.00     Total pack years: 1.40     Types: Cigarettes     Quit date: 10/11/2023     Years since quittin.3     Passive exposure: Never    Smokeless tobacco: Former     Quit date: 10/26/2018    Tobacco comments:     Sporadic smoking prior to quitting.    Vaping Use    Vaping Use: Former    Substances: Nicotine, Flavoring    Devices: Disposable   Substance Use Topics    Alcohol use: Not Currently    Drug use: No        Family History:     Family History   Problem Relation Age of Onset    Alcohol/Drug Father     Hypertension Maternal Grandmother     Alcohol/Drug Sister     Glaucoma No family hx of     Macular Degeneration No family hx of         Medications:     Current Outpatient Medications   Medication Sig    SUMAtriptan (IMITREX) 50 MG tablet Take 1 tablet (50 mg) by mouth at onset of headache for migraine May repeat in 2 hours. Max 4 tablets/24 hours.    VITAMIN D, CHOLECALCIFEROL, PO Take by mouth daily     No current facility-administered medications for this visit.        Allergies:   No Known Allergies     Review of Systems:   As noted above     Physical Exam:   General: Seated comfortably in no acute distress.  Neurologic:     Mental Status: Fully alert, attentive and oriented. Speech clear and fluent, no paraphasic errors.     Cranial Nerves: EOM appear intact. Facial movements symmetric. Hearing not formally tested but intact to conversation.  No dysarthria.  Neck ROM modest, some limitations from pain at times.      Motor: No tremors or other abnormal movements observed.      Sensory:Not able to be tested virtually            Data: Pertinent prior to visit   Imaging:  Imaging:  MRA 2022  IMPRESSION:  1. Incidental persistent left trigeminal artery.  2. A 2 to 3 mm conically shaped outpouching is present at the left  anterior choroidal artery origin with probable vessel emanating from  the tip. This presumably  represents incidental infundibulum, however  given size aneurysm cannot be excluded.  3. A 1 to 2 mm outpouching of the right internal carotid artery just  proximal to the ophthalmic artery origin. This could represent  aneurysm or infundibulum.  4. 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  5. Consider CTA follow-up for further characterization.     MRI BRAIN WITHOUT AND WITH CONTRAST  7/5/2022 4:47 PM     HISTORY:  Worsening headaches      TECHNIQUE:  Multiplanar, multisequence MRI of the brain without and  with 8ml gadavist     COMPARISON: None.     FINDINGS:  The cerebral hemispheres, brainstem, and cerebellum demonstrate normal  morphology and signal. No evidence of ischemia, hemorrhage, mass, mass  effect, or hydrocephalus. No abnormal enhancement or diffusion  restriction.     Marrow signal is within normal limits. The visualized paranasal  sinuses, tympanic cavities, and mastoid cavities are unremarkable.                                                                      IMPRESSION:  Unremarkable MRI of the head.     I personally reviewed the above imaging and agree with the findings in the report.         CT ANGIOGRAM OF THE HEAD AND NECK WITH CONTRAST  2/10/2023 8:45 AM      HISTORY: Questionable aneurysms. Abnormal MRA, brain.     TECHNIQUE:  CT angiography with an injection of 68 mL Isovue-370 IV  with scans through the head and neck. Images were transferred to a  separate 3-D workstation where multiplanar reformations and 3-D images  were created. Estimates of carotid stenoses are made relative to the  distal internal carotid artery diameters except as noted. Radiation  dose for this scan was reduced using automated exposure control,  adjustment of the mA and/or kV according to patient size, or iterative  reconstruction technique.     COMPARISON: MRA 7/5/2022.      CT HEAD FINDINGS: No contrast enhancing lesions. Cerebral blood flow  is grossly normal.      CT  ANGIOGRAM HEAD FINDINGS:  The major intracranial arteries including  the proximal branches of the anterior cerebral, middle cerebral, and  posterior cerebral arteries appear patent without vascular cutoff. No  definite intracranial aneurysm identified. Tubular structure  projecting posteriorly from the left ICA terminus may represent  infundibulum of the left choroidal artery. No significant stenosis.      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     Small or absent left A1 segment with a patent anterior communicating  artery. The anterior communicating artery appears fenestrated.     CT ANGIOGRAM NECK FINDINGS:   Normal origin of the great vessels from the aortic arch.      Right carotid artery: The right common and internal carotid arteries  are patent. No significant stenosis or atherosclerotic disease in the  carotid artery.      Left carotid artery: The left common and internal carotid arteries are  patent. No significant stenosis or atherosclerotic disease in the  carotid artery.      Vertebral arteries: Vertebral arteries are patent without evidence of  dissection. No significant stenosis.      Other findings: None.                                                                       IMPRESSION:  1. Normal CTA of the neck.  2. 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  3. No definite intracranial aneurysm. Presumed infundibulum at the  posterior aspect of the left ICA terminus probably at the left  choroidal artery.  4. 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 This time included time spent with the patient/on video, prep work, ordering tests, and performing post visit documentation.    The longitudinal plan of care for migraine  headaches, occipital neuralgia was addressed during this visit. Due to the added complexity in care, I will continue to support Ms. Easton in the subsequent management of this condition(s) and with the ongoing continuity of care of this condition(s).

## 2024-02-06 ENCOUNTER — VIRTUAL VISIT (OUTPATIENT)
Dept: NEUROLOGY | Facility: CLINIC | Age: 44
End: 2024-02-06
Payer: COMMERCIAL

## 2024-02-06 DIAGNOSIS — M54.81 BILATERAL OCCIPITAL NEURALGIA: Primary | ICD-10-CM

## 2024-02-06 DIAGNOSIS — R51.9 MIXED HEADACHE: ICD-10-CM

## 2024-02-06 DIAGNOSIS — M54.2 NECK PAIN: ICD-10-CM

## 2024-02-06 PROCEDURE — G2211 COMPLEX E/M VISIT ADD ON: HCPCS | Mod: 95 | Performed by: STUDENT IN AN ORGANIZED HEALTH CARE EDUCATION/TRAINING PROGRAM

## 2024-02-06 PROCEDURE — 99214 OFFICE O/P EST MOD 30 MIN: CPT | Mod: 95 | Performed by: STUDENT IN AN ORGANIZED HEALTH CARE EDUCATION/TRAINING PROGRAM

## 2024-02-06 RX ORDER — SUMATRIPTAN 50 MG/1
50 TABLET, FILM COATED ORAL
Qty: 18 TABLET | Refills: 11 | Status: SHIPPED | OUTPATIENT
Start: 2024-02-06

## 2024-02-06 NOTE — PROGRESS NOTES
Pattie is a 43 year old who is being evaluated via a billable video visit.      How would you like to obtain your AVS? MyChart  If the video visit is dropped, the invitation should be resent by: Text to cell phone: [phone]  Will anyone else be joining your video visit? No        Video-Visit Details    Type of service:  Video Visit   Video data: 8:31-8:48 AM     Originating Location (pt. Location): at work, in [place]    Distant Location (provider location):  On-site  Platform used for Video Visit: Aixa

## 2024-02-06 NOTE — LETTER
2/6/2024         RE: Pattie Easton  [address]  [place] [address]        Dear Colleague,    Thank you for referring your patient, Pattie Easton, to the [facility]. Please see a copy of my visit note below.    [facility]/[place]  Section of General Neurology  Return Patient  Virtual Visit    Pattie Easton MRN# [id number]   Age: 43 year old YOB: 1980            Assessment and Plan:   Assessment:  Pattie Easton is a pleasant 43 year old female seen in follow up for mixed headaches.  They are migrainous in nature at times but also provoked by neck pain and with elements of R>L occipital neuralgia as well.   Overall she feels better than previously but still not quite where she would want to be ideally.  Discussed options.  The neck pain is most limiting and where she describes the pain seems to represent occipital neuralgia most prominently.   She previously had a modest response to an injection and would like to re trial this to see if her symptoms can be improved further. Not to the point where she would like to re try a medication.   Plan as below, all questions answered.      Plan:  Agree with trial of acupuncture, reach out if a script would help  Continue imitrex 50 mg PRN  To discussion will not start a daily medication again  Referral to repeat previously helpful bilateral occiptial nerve blocks  To follow up in 1 year sooner with any issues questions or changes      Varinder Canseco MD   of Neurology   [facility]      Interval history:     Now following with sleep medicine---in work up for sleep apnea.   CTA--as below demonstrates likely infundibulum no clear aneurysm.    Headaches--touch and go.    Not as consistent.  Still improved since when we met.    Neck tension remains a big variable.  Still doing stretch and massage.    Kaleidoscope vision at times too.    Keeps imitrex on her at  work/at home/ all over just in case.    Discussed repeating occipital nerve block.  She feels maybe it is worsened to the point where she needs it.    Worse on right but present on left too  Previous nerve blocks helped.    To look into if insurance would cover it.    Is crocheting now and hasn't worsened neck tightness which is good news.   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    Not to point she wants to try daily medications    A/P at previous visit  Pattie Easton is a pleasant 42 year old female seen in follow up for mixed headaches.  She is doing much better and is only requiring imitrex PRN.  Due to previously significantly worsening headaches we had obtained MRI/MRA.  MRI was unrevealing but MRA showed possible small aneursyms.  Will obtain CTA to follow up on these findings.  Overall I am pleased with her progress.  All questions answered     Plan:  CTA head and neck  Continue imitrex 50 mg PRN, refilled  Follow up with me in 1 year        Varinder Canseco MD   of Neurology   [facility]      Past Medical History:     Patient Active Problem List   Diagnosis     Hypertrophy of breast     Bilateral occipital neuralgia     Excess skin of abdomen     Past Medical History:   Diagnosis Date     NO ACTIVE PROBLEMS         Past Surgical History:     Past Surgical History:   Procedure Laterality Date     ABDOMINOPLASTY, PANNICULECTOMY, COMBINED Bilateral 10/21/2022    Procedure: Cosmetic ABDOMINOPLASTY, Left Breast Dog Ear removal;  Surgeon: JUN Boland MD;  Location: [facility]     CYSTECTOMY PILONIDAL       HYSTERECTOMY, PAP NO LONGER INDICATED  2018     LASIK       LIPOSUCTION (LOCATION) Bilateral 10/21/2022    Procedure: Cosmetic Liposuction of Abdomen and Flanks;  Surgeon: JUN Boland MD;  Location: [facility]     MAMMOPLASTY REDUCTION BILATERAL Bilateral 11/05/2021    Procedure: MAMMOPLASTY,  REDUCTION, BILATERAL;  Surgeon: JUN Boland MD;  Location: [facility]     NERVE BLOCK OCCIPITAL Bilateral 02/10/2022    Procedure: BLOCK, NERVE, OCCIPITAL;  Surgeon: Gómez Oliver MD;  Location: [facility] OR        Social History:     Social History     Tobacco Use     Smoking status: Former     Packs/day: 0.10     Years: 14.00     Additional pack years: 0.00     Total pack years: 1.40     Types: Cigarettes     Quit date: 10/11/2023     Years since quittin.3     Passive exposure: Never     Smokeless tobacco: Former     Quit date: 10/26/2018     Tobacco comments:     Sporadic smoking prior to quitting.    Vaping Use     Vaping Use: Former     Substances: Nicotine, Flavoring     Devices: Disposable   Substance Use Topics     Alcohol use: Not Currently     Drug use: No        Family History:     Family History   Problem Relation Age of Onset     Alcohol/Drug Father      Hypertension Maternal Grandmother      Alcohol/Drug Sister      Glaucoma No family hx of      Macular Degeneration No family hx of         Medications:     Current Outpatient Medications   Medication Sig     SUMAtriptan (IMITREX) 50 MG tablet Take 1 tablet (50 mg) by mouth at onset of headache for migraine May repeat in 2 hours. Max 4 tablets/24 hours.     VITAMIN D, CHOLECALCIFEROL, PO Take by mouth daily     No current facility-administered medications for this visit.        Allergies:   No Known Allergies     Review of Systems:   As noted above     Physical Exam:   General: Seated comfortably in no acute distress.  Neurologic:     Mental Status: Fully alert, attentive and oriented. Speech clear and fluent, no paraphasic errors.     Cranial Nerves: EOM appear intact. Facial movements symmetric. Hearing not formally tested but intact to conversation.  No dysarthria.  Neck ROM modest, some limitations from pain at times.      Motor: No tremors or other abnormal movements observed.      Sensory:Not able to be tested virtually            Data:  Pertinent prior to visit   Imaging:  Imaging:  MRA 7/2022  IMPRESSION:  1. Incidental persistent left trigeminal artery.  2. A 2 to 3 mm conically shaped outpouching is present at the left  anterior choroidal artery origin with probable vessel emanating from  the tip. This presumably represents incidental infundibulum, however  given size aneurysm cannot be excluded.  3. A 1 to 2 mm outpouching of the right internal carotid artery just  proximal to the ophthalmic artery origin. This could represent  aneurysm or infundibulum.  4. 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  5. Consider CTA follow-up for further characterization.     MRI BRAIN WITHOUT AND WITH CONTRAST  7/5/2022 4:47 PM     HISTORY:  Worsening headaches      TECHNIQUE:  Multiplanar, multisequence MRI of the brain without and  with 8ml gadavist     COMPARISON: None.     FINDINGS:  The cerebral hemispheres, brainstem, and cerebellum demonstrate normal  morphology and signal. No evidence of ischemia, hemorrhage, mass, mass  effect, or hydrocephalus. No abnormal enhancement or diffusion  restriction.     Marrow signal is within normal limits. The visualized paranasal  sinuses, tympanic cavities, and mastoid cavities are unremarkable.                                                                      IMPRESSION:  Unremarkable MRI of the head.     I personally reviewed the above imaging and agree with the findings in the report.         CT ANGIOGRAM OF THE HEAD AND NECK WITH CONTRAST  2/10/2023 8:45 AM      HISTORY: Questionable aneurysms. Abnormal MRA, brain.     TECHNIQUE:  CT angiography with an injection of 68 mL Isovue-370 IV  with scans through the head and neck. Images were transferred to a  separate 3-D workstation where multiplanar reformations and 3-D images  were created. Estimates of carotid stenoses are made relative to the  distal internal carotid artery diameters except as noted. Radiation  dose for this  scan was reduced using automated exposure control,  adjustment of the mA and/or kV according to patient size, or iterative  reconstruction technique.     COMPARISON: MRA 7/5/2022.      CT HEAD FINDINGS: No contrast enhancing lesions. Cerebral blood flow  is grossly normal.      CT ANGIOGRAM HEAD FINDINGS:  The major intracranial arteries including  the proximal branches of the anterior cerebral, middle cerebral, and  posterior cerebral arteries appear patent without vascular cutoff. No  definite intracranial aneurysm identified. Tubular structure  projecting posteriorly from the left ICA terminus may represent  infundibulum of the left choroidal artery. No significant stenosis.      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     Small or absent left A1 segment with a patent anterior communicating  artery. The anterior communicating artery appears fenestrated.     CT ANGIOGRAM NECK FINDINGS:   Normal origin of the great vessels from the aortic arch.      Right carotid artery: The right common and internal carotid arteries  are patent. No significant stenosis or atherosclerotic disease in the  carotid artery.      Left carotid artery: The left common and internal carotid arteries are  patent. No significant stenosis or atherosclerotic disease in the  carotid artery.      Vertebral arteries: Vertebral arteries are patent without evidence of  dissection. No significant stenosis.      Other findings: None.                                                                       IMPRESSION:  1. Normal CTA of the neck.  2. 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  3. No definite intracranial aneurysm. Presumed infundibulum at the  posterior aspect of the left ICA terminus probably at the left  choroidal artery.  4. 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 This time included time spent with the patient/on video, prep work, ordering tests, and performing post visit documentation.    The longitudinal plan of care for migraine headaches, occipital neuralgia was addressed during this visit. Due to the added complexity in care, I will continue to support Ms. Easton in the subsequent management of this condition(s) and with the ongoing continuity of care of this condition(s).      Pattie is a 43 year old who is being evaluated via a billable video visit.      How would you like to obtain your AVS? MyChart  If the video visit is dropped, the invitation should be resent by: Text to cell phone: [phone]  Will anyone else be joining your video visit? No        Video-Visit Details    Type of service:  Video Visit   Video data: 8:31-8:48 AM     Originating Location (pt. Location): at work, in [place]    Distant Location (provider location):  On-site  Platform used for Video Visit: Aiax        Again, thank you for allowing me to participate in the care of your patient.        Sincerely,        Rommel Canseco MD

## 2024-02-13 ENCOUNTER — OFFICE VISIT (OUTPATIENT)
Dept: SLEEP MEDICINE | Facility: CLINIC | Age: 44
End: 2024-02-13
Payer: COMMERCIAL

## 2024-02-13 DIAGNOSIS — R40.0 DAYTIME SLEEPINESS: ICD-10-CM

## 2024-02-13 DIAGNOSIS — R06.83 SNORING: ICD-10-CM

## 2024-02-13 DIAGNOSIS — R06.81 WITNESSED APNEIC SPELLS: ICD-10-CM

## 2024-02-13 PROCEDURE — G0399 HOME SLEEP TEST/TYPE 3 PORTA: HCPCS | Performed by: INTERNAL MEDICINE

## 2024-02-13 NOTE — PROGRESS NOTES
Pt is completing a home sleep test. Pt was instructed on how to put on the Noxturnal T3 device and associated equipment before going to bed and given the opportunity to practice putting it on before leaving the sleep center. Pt was reminded to bring the home sleep test kit back to the center tomorrow, at agreed upon time for download and reporting.   Neck circumference: 36 CM / 14 inches.

## 2024-02-14 ENCOUNTER — DOCUMENTATION ONLY (OUTPATIENT)
Dept: SLEEP MEDICINE | Facility: CLINIC | Age: 44
End: 2024-02-14
Payer: COMMERCIAL

## 2024-02-14 NOTE — PROGRESS NOTES
This HSAT was performed using a Noxturnal T3 device which recorded snore, sound, movement activity, body position, nasal pressure, oronasal thermal airflow, pulse, oximetry and both chest and abdominal respiratory effort. HSAT data was restricted to the time patient states they were in bed.     HSAT was scored using 1B 4% hypopnea rule.     AHI: 2.1.  Snoring was reported as loud.  Time with SpO2 below 89% was 0.1 minutes.   Overall signal quality was good     Pt will follow up with sleep provider to determine appropriate therapy.     Ordering Provider, Taz Porter APRN CNP C. Oyugi, BA, RPSGT, RST System Clinical Specialist/ 2/14/2024

## 2024-02-14 NOTE — PROGRESS NOTES
HST POST-STUDY QUESTIONNAIRE    What time did you go to bed?  7:50p  How long do you think it took to fall asleep?  30min?  What time did you wake up to start the day?  5am  Did you get up during the night at all?  no  If you woke up, do you remember approximately what time(s)? 12 or 1 I can't remember and around 3 or 4?  Did you have any difficulty with the equipment?  No  Did you us any type of treatment with this study?  None  Was the head of the bed elevated? No  Did you sleep in a recliner?  No  Did you stop using CPAP at least 3 days before this test?  NA  Any other information you'd like us to know? -

## 2024-02-15 NOTE — PROCEDURES
"HOME SLEEP STUDY INTERPRETATION    Patient: Pattie Easotn  MRN: [id number]  YOB: 1980  Study Date: 2/13/2024  Referring Provider: Breanna Camilo; APRN CNP  Ordering Provider: Taz FUENTES CNP     Indications for Home Study: Pattie Easton is a 43 year old female who presents with symptoms suggestive of obstructive sleep apnea.    Estimated body mass index is 29.32 kg/m  as calculated from the following:    Height as of 1/9/24: 1.727 m (5' 8\").    Weight as of 1/9/24: 87.5 kg (192 lb 12.8 oz).  Total score - [place]: 5 (1/9/2024 11:00 AM)  Total Score: 2 (1/2/2024  4:09 PM)    Data: A full night home sleep study was performed recording the standard physiologic parameters including body position, movement, sound, nasal pressure, thermal oral airflow, chest and abdominal movements with respiratory inductance plethysmography, and oxygen saturation by pulse oximetry. Pulse rate was estimated by oximetry recording. This study was considered adequate based on > 4 hours of quality oximetry and respiratory recording. As specified by the AASM Manual for the Scoring of Sleep and Associated events, version 2.3, Rule VIII.D 1B, 4% oxygen desaturation scoring for hypopneas is used as a standard of care on all home sleep apnea testing.    Analysis Time:  561.5 minutes    Respiration:   Sleep Associated Hypoxemia: sustained hypoxemia was not present. Baseline oxygen saturation was 95%.  Time with saturation less than or equal to 88% was 0 minutes. The lowest oxygen saturation was 87%.   Snoring: Snoring was present.  Respiratory events: The home study revealed a presence of 5 obstructive apneas and 5 mixed and central apneas. There were 10 hypopneas resulting in a combined apnea/hypopnea index [AHI] of 2.1 events per hour.  AHI was 3.5 per hour supine, N/A per hour prone, N/A per hour on left side, and 1 per hour on right side.   Pattern: Excluding events noted above, respiratory rate and pattern was " Normal.    Position: Percent of time spent: supine - 45.7%, prone - 0%, on left - 0%, on right - 54.3%.    Heart Rate: By pulse oximetry normal rate was noted.     Assessment:   Patient did not rule in for obstructive sleep apnea.  Sleep associated hypoxemia was not present.    Recommendations:  Consider positional therapy.  Suggest optimizing sleep hygiene and avoiding sleep deprivation.  Weight management.    Diagnosis Code(s): Snoring R06.83    Julio Eastman DO, February 15, 2024   Diplomate, American Board of Internal Medicine, Sleep Medicine

## 2024-02-17 ENCOUNTER — HEALTH MAINTENANCE LETTER (OUTPATIENT)
Age: 44
End: 2024-02-17

## 2024-04-26 ENCOUNTER — VIRTUAL VISIT (OUTPATIENT)
Dept: FAMILY MEDICINE | Facility: CLINIC | Age: 44
End: 2024-04-26
Payer: COMMERCIAL

## 2024-04-26 DIAGNOSIS — K90.49 FOOD INTOLERANCE: Primary | ICD-10-CM

## 2024-04-26 PROCEDURE — 99442 PR PHYSICIAN TELEPHONE EVALUATION 11-20 MIN: CPT | Performed by: NURSE PRACTITIONER

## 2024-05-10 ENCOUNTER — LAB (OUTPATIENT)
Dept: LAB | Facility: CLINIC | Age: 44
End: 2024-05-10
Payer: COMMERCIAL

## 2024-05-10 DIAGNOSIS — K90.49 FOOD INTOLERANCE: ICD-10-CM

## 2024-05-10 PROCEDURE — 86231 EMA EACH IG CLASS: CPT | Mod: 90

## 2024-05-10 PROCEDURE — 99000 SPECIMEN HANDLING OFFICE-LAB: CPT

## 2024-05-10 PROCEDURE — 86364 TISS TRNSGLTMNASE EA IG CLAS: CPT

## 2024-05-10 PROCEDURE — 82607 VITAMIN B-12: CPT

## 2024-05-10 PROCEDURE — 86258 DGP ANTIBODY EACH IG CLASS: CPT

## 2024-05-10 PROCEDURE — 82784 ASSAY IGA/IGD/IGG/IGM EACH: CPT

## 2024-05-10 PROCEDURE — 36415 COLL VENOUS BLD VENIPUNCTURE: CPT

## 2024-05-11 LAB — VIT B12 SERPL-MCNC: 464 PG/ML (ref 232–1245)

## 2024-05-13 LAB
ENDOMYSIUM IGA TITR SER IF: NORMAL {TITER}
IGA SERPL-MCNC: 221 MG/DL (ref 84–499)

## 2024-05-14 LAB
GLIADIN IGA SER-ACNC: 2.8 U/ML
GLIADIN IGG SER-ACNC: 0.7 U/ML
TTG IGA SER-ACNC: 0.5 U/ML
TTG IGG SER-ACNC: 0.7 U/ML

## 2024-09-13 ENCOUNTER — PATIENT OUTREACH (OUTPATIENT)
Dept: CARE COORDINATION | Facility: CLINIC | Age: 44
End: 2024-09-13
Payer: COMMERCIAL

## 2024-09-26 ENCOUNTER — VIRTUAL VISIT (OUTPATIENT)
Dept: FAMILY MEDICINE | Facility: CLINIC | Age: 44
End: 2024-09-26
Payer: COMMERCIAL

## 2024-09-26 DIAGNOSIS — F90.2 ATTENTION DEFICIT HYPERACTIVITY DISORDER (ADHD), COMBINED TYPE: Primary | ICD-10-CM

## 2024-09-26 DIAGNOSIS — Z12.31 VISIT FOR SCREENING MAMMOGRAM: ICD-10-CM

## 2024-09-26 PROCEDURE — 99213 OFFICE O/P EST LOW 20 MIN: CPT | Mod: 95 | Performed by: NURSE PRACTITIONER

## 2024-09-26 RX ORDER — DEXTROAMPHETAMINE SACCHARATE, AMPHETAMINE ASPARTATE MONOHYDRATE, DEXTROAMPHETAMINE SULFATE AND AMPHETAMINE SULFATE 5; 5; 5; 5 MG/1; MG/1; MG/1; MG/1
20 CAPSULE, EXTENDED RELEASE ORAL DAILY
Qty: 30 CAPSULE | Refills: 0 | Status: SHIPPED | OUTPATIENT
Start: 2024-11-25 | End: 2024-12-25

## 2024-09-26 RX ORDER — DEXTROAMPHETAMINE SACCHARATE, AMPHETAMINE ASPARTATE MONOHYDRATE, DEXTROAMPHETAMINE SULFATE AND AMPHETAMINE SULFATE 5; 5; 5; 5 MG/1; MG/1; MG/1; MG/1
20 CAPSULE, EXTENDED RELEASE ORAL DAILY
Qty: 30 CAPSULE | Refills: 0 | Status: SHIPPED | OUTPATIENT
Start: 2024-10-26 | End: 2024-11-25

## 2024-09-26 RX ORDER — DEXTROAMPHETAMINE SACCHARATE, AMPHETAMINE ASPARTATE MONOHYDRATE, DEXTROAMPHETAMINE SULFATE AND AMPHETAMINE SULFATE 5; 5; 5; 5 MG/1; MG/1; MG/1; MG/1
20 CAPSULE, EXTENDED RELEASE ORAL DAILY
Qty: 30 CAPSULE | Refills: 0 | Status: SHIPPED | OUTPATIENT
Start: 2024-09-26 | End: 2024-10-26

## 2024-09-26 ASSESSMENT — ANXIETY QUESTIONNAIRES
6. BECOMING EASILY ANNOYED OR IRRITABLE: MORE THAN HALF THE DAYS
1. FEELING NERVOUS, ANXIOUS, OR ON EDGE: SEVERAL DAYS
GAD7 TOTAL SCORE: 6
5. BEING SO RESTLESS THAT IT IS HARD TO SIT STILL: NEARLY EVERY DAY
2. NOT BEING ABLE TO STOP OR CONTROL WORRYING: NOT AT ALL
3. WORRYING TOO MUCH ABOUT DIFFERENT THINGS: NOT AT ALL
7. FEELING AFRAID AS IF SOMETHING AWFUL MIGHT HAPPEN: NOT AT ALL
GAD7 TOTAL SCORE: 6

## 2024-09-26 ASSESSMENT — PATIENT HEALTH QUESTIONNAIRE - PHQ9
5. POOR APPETITE OR OVEREATING: NOT AT ALL
SUM OF ALL RESPONSES TO PHQ QUESTIONS 1-9: 6

## 2024-09-26 NOTE — PROGRESS NOTES
Pattie is a 43 year old who is being evaluated via a billable video visit.    How would you like to obtain your AVS? MyChart  If the video visit is dropped, the invitation should be resent by: Text to cell phone: [phone]  Will anyone else be joining your video visit? No      Assessment & Plan     Attention deficit hyperactivity disorder (ADHD), combined type  Patient has history of ADHD confirmed in chart has not been taking medications for greater than 3 years has noticed that her symptoms are not controlled would like to restart her ADHD medication.  - amphetamine-dextroamphetamine (ADDERALL XR) 20 MG 24 hr capsule; Take 1 capsule (20 mg) by mouth daily.  - amphetamine-dextroamphetamine (ADDERALL XR) 20 MG 24 hr capsule; Take 1 capsule (20 mg) by mouth daily.  - amphetamine-dextroamphetamine (ADDERALL XR) 20 MG 24 hr capsule; Take 1 capsule (20 mg) by mouth daily.    Visit for screening mammogram  - MA Screening Bilateral w/ [place]; Future  - REVIEW OF HEALTH MAINTENANCE PROTOCOL ORDERS            See Patient Instructions    Subjective   Pattie is a 43 year old, presenting for the following health issues:  A.D.H.D      9/26/2024    11:28 AM   Additional Questions   Roomed by jose l   Accompanied by self     HPI     Medication Followup of ADHD   Taking Medication as prescribed: would like to get back on aderall, she has not been on it since 2020, she was taking 20mg x2 times a day and going through [facility]   Side Effects:  she is having trouble concentrating   Medication Helping Symptoms:  NA       9/26/2024    11:31 AM   PHQ   PHQ-9 Total Score 6   Q9: Thoughts of better off dead/self-harm past 2 weeks Not at all          9/26/2024    11:31 AM   [place]-7 SCORE   Total Score 6              Review of Systems  Constitutional, HEENT, cardiovascular, pulmonary, gi and gu systems are negative, except as otherwise noted.      Objective           Vitals:  No vitals were obtained today due to virtual  visit.    Physical Exam   GENERAL: alert and no distress  EYES: Eyes grossly normal to inspection.  No discharge or erythema, or obvious scleral/conjunctival abnormalities.  RESP: No audible wheeze, cough, or visible cyanosis.    SKIN: Visible skin clear. No significant rash, abnormal pigmentation or lesions.  NEURO: Cranial nerves grossly intact.  Mentation and speech appropriate for age.  PSYCH: Appropriate affect, tone, and pace of words          Video-Visit Details    Type of service:  Video Visit   Originating Location (pt. Location): Home    Distant Location (provider location):  On-site  Platform used for Video Visit: Aixa  Signed Electronically by: ALFREDO Tapia CNP

## 2024-09-27 ENCOUNTER — PATIENT OUTREACH (OUTPATIENT)
Dept: CARE COORDINATION | Facility: CLINIC | Age: 44
End: 2024-09-27
Payer: COMMERCIAL

## 2024-10-07 ENCOUNTER — MYC MEDICAL ADVICE (OUTPATIENT)
Dept: FAMILY MEDICINE | Facility: CLINIC | Age: 44
End: 2024-10-07
Payer: COMMERCIAL

## 2024-10-07 DIAGNOSIS — F90.2 ATTENTION DEFICIT HYPERACTIVITY DISORDER (ADHD), COMBINED TYPE: Primary | ICD-10-CM

## 2024-10-10 RX ORDER — DEXTROAMPHETAMINE SACCHARATE, AMPHETAMINE ASPARTATE, DEXTROAMPHETAMINE SULFATE AND AMPHETAMINE SULFATE 2.5; 2.5; 2.5; 2.5 MG/1; MG/1; MG/1; MG/1
10 TABLET ORAL 2 TIMES DAILY
Qty: 30 TABLET | Refills: 0 | Status: SHIPPED | OUTPATIENT
Start: 2024-10-10 | End: 2024-10-25

## 2024-10-25 ENCOUNTER — VIRTUAL VISIT (OUTPATIENT)
Dept: FAMILY MEDICINE | Facility: CLINIC | Age: 44
End: 2024-10-25
Payer: COMMERCIAL

## 2024-10-25 DIAGNOSIS — F90.2 ATTENTION DEFICIT HYPERACTIVITY DISORDER (ADHD), COMBINED TYPE: Primary | ICD-10-CM

## 2024-10-25 PROCEDURE — 99214 OFFICE O/P EST MOD 30 MIN: CPT | Mod: 95 | Performed by: NURSE PRACTITIONER

## 2024-10-25 RX ORDER — DEXTROAMPHETAMINE SACCHARATE, AMPHETAMINE ASPARTATE, DEXTROAMPHETAMINE SULFATE AND AMPHETAMINE SULFATE 5; 5; 5; 5 MG/1; MG/1; MG/1; MG/1
20 TABLET ORAL 2 TIMES DAILY
Qty: 60 TABLET | Refills: 0 | Status: SHIPPED | OUTPATIENT
Start: 2024-11-24 | End: 2024-12-24

## 2024-10-25 RX ORDER — DEXTROAMPHETAMINE SACCHARATE, AMPHETAMINE ASPARTATE, DEXTROAMPHETAMINE SULFATE AND AMPHETAMINE SULFATE 5; 5; 5; 5 MG/1; MG/1; MG/1; MG/1
20 TABLET ORAL 2 TIMES DAILY
Qty: 60 TABLET | Refills: 0 | Status: SHIPPED | OUTPATIENT
Start: 2024-12-24 | End: 2025-01-23

## 2024-10-25 RX ORDER — DEXTROAMPHETAMINE SACCHARATE, AMPHETAMINE ASPARTATE, DEXTROAMPHETAMINE SULFATE AND AMPHETAMINE SULFATE 5; 5; 5; 5 MG/1; MG/1; MG/1; MG/1
20 TABLET ORAL 2 TIMES DAILY
Qty: 60 TABLET | Refills: 0 | Status: SHIPPED | OUTPATIENT
Start: 2024-10-25 | End: 2024-11-24

## 2024-10-25 NOTE — PROGRESS NOTES
"Pattie is a 43 year old who is being evaluated via a billable video visit.    How would you like to obtain your AVS? MyChart  If the video visit is dropped, the invitation should be resent by: Text to cell phone: [phone]  Will anyone else be joining your video visit? No      Assessment & Plan     Attention deficit hyperactivity disorder (ADHD), combined type  Patient did not tolerate long-acting Adderall is on 10 mg immediate release twice a day not controlling her symptoms we will attempt 20 mg immediate release Adderall we will fill for 3 months if not improving patient will follow back up before your next refill  - amphetamine-dextroamphetamine (ADDERALL) 20 MG tablet; Take 1 tablet (20 mg) by mouth 2 times daily.  - amphetamine-dextroamphetamine (ADDERALL) 20 MG tablet; Take 1 tablet (20 mg) by mouth 2 times daily.  - amphetamine-dextroamphetamine (ADDERALL) 20 MG tablet; Take 1 tablet (20 mg) by mouth 2 times daily.          BMI  Estimated body mass index is 29.32 kg/m  as calculated from the following:    Height as of 1/9/24: 1.727 m (5' 8\").    Weight as of 1/9/24: 87.5 kg (192 lb 12.8 oz).   Weight management plan: Discussed healthy diet and exercise guidelines      See Patient Instructions    Subjective   Pattie is a 43 year old, presenting for the following health issues:  A.D.H.D        10/25/2024    12:08 PM   Additional Questions   Roomed by Emiliana     History of Present Illness       Reason for visit:  ADHD    She eats 0-1 servings of fruits and vegetables daily.She consumes 0 sweetened beverage(s) daily.She exercises with enough effort to increase her heart rate 9 or less minutes per day.  She exercises with enough effort to increase her heart rate 3 or less days per week.   She is taking medications regularly.               Review of Systems  Constitutional, HEENT, cardiovascular, pulmonary, gi and gu systems are negative, except as otherwise noted.      Objective    Vitals - Patient " Reported  Weight (Patient Reported): 79.8 kg (176 lb)      Vitals:  No vitals were obtained today due to virtual visit.    Physical Exam   GENERAL: alert and no distress  EYES: Eyes grossly normal to inspection.  No discharge or erythema, or obvious scleral/conjunctival abnormalities.  RESP: No audible wheeze, cough, or visible cyanosis.    SKIN: Visible skin clear. No significant rash, abnormal pigmentation or lesions.  NEURO: Cranial nerves grossly intact.  Mentation and speech appropriate for age.  PSYCH: Appropriate affect, tone, and pace of words    No results found for any visits on 10/25/24.      Video-Visit Details    Type of service:  Video Visit   Originating Location (pt. Location): Home    Distant Location (provider location):  On-site  Platform used for Video Visit: Leatha  Signed Electronically by: ALFREDO Tapia CNP

## 2024-10-30 NOTE — PROGRESS NOTES
"CLINICAL NUTRITION SERVICES - REASSESSMENT NOTE     Nutrition Prescription    RECOMMENDATIONS FOR MDs/PROVIDERS TO ORDER:  Consider SLP consult.  Pt reports dysphagia to solid foods  Consider liberal diet    Malnutrition Status:    Severe malnutrition in the context of acute illness    Recommendations already ordered by Registered Dietitian (RD):  Calorie counts 10/31-11/2  Ensure TID    Future/Additional Recommendations:  May need to consider enteral nutrition support if aggressive care warranted     EVALUATION OF THE PROGRESS TOWARD GOALS   Diet: 2 g Sodium 1500 ml FR, Magic cup with dinner  Intake: 25 to 50 to 100% per flowsheets  - ordering only one meal per day over past 3 days     NEW FINDINGS   Per patient and family at bedside, po intake is poor due to decline in appetite which began three weeks ago.  Consumed \"part of an omelet\" this am, has not been interested in lunch (@ 1500).  Yesterday, pt ate a yogurt and grapes.  Reports some dysphagia to solid foods, \"difficult to describe\", liquids are easier; denies coughing or choking with eating. Noted pt being treated for pneumonia, ? aspiration. Per pt, primary barrier to PO intake is poor appetite.  Pt amenable to ensure.    Weight: 75 kg, down from admit but may be related to diuresis    Labs: reviewed    Meds: reviewed and notable for IV bumex, aldactone, thera vit, thiamin; CHO coverage and Lantus.  Miralax and senna    GI: LBM yesterday    MALNUTRITION  % Intake: </= 50% for >/= 5 days (severe)  % Weight Loss: Weight loss does not meet criteria  Subcutaneous Fat Loss: Facial region:  mild and Upper arm:  mild to moderate  Muscle Loss: Temporal:  mild, Dorsal hand:  mild, Upper leg (quadricep, hamstring):  mild, and Posterior calf:  mild  Fluid Accumulation/Edema: Does not meet criteria  Malnutrition Diagnosis: Severe malnutrition in the context of acute illness    Previous Goals   Patient to consume % of nutritionally adequate meal trays TID, or the " "Pattie is a 43 year old who is being evaluated via a billable telephone visit.    What phone number would you like to be contacted at? [phone]  How would you like to obtain your AVS? MyChart  Originating Location (pt. Location): Home    Distant Location (provider location):  On-site    Assessment & Plan     Food intolerance  Patient would like a referral to nutritionist will also screen for celiac's labs pending referral placed  - Vitamin B12  - IgA [LAB73]  - Deamidated Giladin Peptide Melyssa IgA IgG [[id number]]  - Tissue transglutaminase melyssa IgA and IgG [[id number]]  - Endomysial Antibody IgA by IFA [[id number]]  - Nutrition Referral        BMI  Estimated body mass index is 29.32 kg/m  as calculated from the following:    Height as of 1/9/24: 1.727 m (5' 8\").    Weight as of 1/9/24: 87.5 kg (192 lb 12.8 oz).   Weight management plan: Discussed healthy diet and exercise guidelines      See Patient Instructions    Subjective   Pattie is a 43 year old, presenting for the following health issues:  Referral        4/26/2024     2:13 PM   Additional Questions   Roomed by Elissa MORTON     Patient would like a referral to a dietician. She has been dealing with fatigue and some other issues and someone recommended that she see a dietician.           Review of Systems  Constitutional, HEENT, cardiovascular, pulmonary, gi and gu systems are negative, except as otherwise noted.      Objective           Vitals:  No vitals were obtained today due to virtual visit.    Physical Exam   General: Alert and no distress //Respiratory: No audible wheeze, cough, or shortness of breath // Psychiatric:  Appropriate affect, tone, and pace of words      No results found.      Phone call duration: 15 minutes  Signed Electronically by: ALFREDO Tapia CNP    " "equivalent with supplements/snacks   Evaluation: Not met    Previous Nutrition Diagnosis  Increased nutrient needs related to hypercatabolism as evidenced by heart failure.     Evaluation: No longer applicable, nutrition diagnosis changed below    CURRENT NUTRITION DIAGNOSIS  Inadequate oral intake related to decrease in appetite, dysphagia to solids as evidenced by multiple missed meals, intake reportedly <50% usual for several days      INTERVENTIONS  Implementation  Medical food supplement therapy    Goals  Patient to consume % of nutritionally adequate meal trays TID, or the equivalent with supplements/snacks.    Monitoring/Evaluation  Progress toward goals will be monitored and evaluated per protocol.    Anita Mayfield, RD, LD, CNSC  \" Clinical Dietitian\" on Vocera  Weekend/Holiday RD - \"Weekend Clinical Dietitian\" on vocera    "

## 2024-11-23 ENCOUNTER — HEALTH MAINTENANCE LETTER (OUTPATIENT)
Age: 44
End: 2024-11-23

## 2024-12-06 ENCOUNTER — HOSPITAL ENCOUNTER (OUTPATIENT)
Dept: MAMMOGRAPHY | Facility: CLINIC | Age: 44
Discharge: HOME | End: 2024-12-06
Attending: NURSE PRACTITIONER | Admitting: NURSE PRACTITIONER
Payer: COMMERCIAL

## 2024-12-06 DIAGNOSIS — Z12.31 VISIT FOR SCREENING MAMMOGRAM: ICD-10-CM

## 2024-12-06 PROCEDURE — 77067 SCR MAMMO BI INCL CAD: CPT

## 2024-12-06 PROCEDURE — 77063 BREAST TOMOSYNTHESIS BI: CPT

## 2025-01-09 ENCOUNTER — VIRTUAL VISIT (OUTPATIENT)
Dept: FAMILY MEDICINE | Facility: CLINIC | Age: 45
End: 2025-01-09
Payer: COMMERCIAL

## 2025-01-09 DIAGNOSIS — F90.2 ATTENTION DEFICIT HYPERACTIVITY DISORDER (ADHD), COMBINED TYPE: Primary | ICD-10-CM

## 2025-01-09 RX ORDER — DEXTROAMPHETAMINE SACCHARATE, AMPHETAMINE ASPARTATE, DEXTROAMPHETAMINE SULFATE AND AMPHETAMINE SULFATE 5; 5; 5; 5 MG/1; MG/1; MG/1; MG/1
20 TABLET ORAL 2 TIMES DAILY
Qty: 60 TABLET | Refills: 0 | Status: SHIPPED | OUTPATIENT
Start: 2025-01-21 | End: 2025-02-20

## 2025-01-09 RX ORDER — DEXTROAMPHETAMINE SACCHARATE, AMPHETAMINE ASPARTATE, DEXTROAMPHETAMINE SULFATE AND AMPHETAMINE SULFATE 5; 5; 5; 5 MG/1; MG/1; MG/1; MG/1
20 TABLET ORAL 2 TIMES DAILY
Qty: 60 TABLET | Refills: 0 | Status: SHIPPED | OUTPATIENT
Start: 2025-03-21 | End: 2025-04-20

## 2025-01-09 RX ORDER — DEXTROAMPHETAMINE SACCHARATE, AMPHETAMINE ASPARTATE, DEXTROAMPHETAMINE SULFATE AND AMPHETAMINE SULFATE 5; 5; 5; 5 MG/1; MG/1; MG/1; MG/1
20 TABLET ORAL 2 TIMES DAILY
Qty: 60 TABLET | Refills: 0 | Status: SHIPPED | OUTPATIENT
Start: 2025-02-21 | End: 2025-03-23

## 2025-01-09 NOTE — PROGRESS NOTES
Pattie is a 44 year old who is being evaluated via a billable video visit.    How would you like to obtain your AVS? MyChart  If the video visit is dropped, the invitation should be resent by: Text to cell phone: [phone]  Will anyone else be joining your video visit? No      Assessment & Plan     Attention deficit hyperactivity disorder (ADHD), combined type   Controlled no change in treatment plan   - amphetamine-dextroamphetamine (ADDERALL) 20 MG tablet; Take 1 tablet (20 mg) by mouth 2 times daily.  - amphetamine-dextroamphetamine (ADDERALL) 20 MG tablet; Take 1 tablet (20 mg) by mouth 2 times daily.  - amphetamine-dextroamphetamine (ADDERALL) 20 MG tablet; Take 1 tablet (20 mg) by mouth 2 times daily.            See Patient Instructions    Subjective   Pattie is a 44 year old, presenting for the following health issues:  RECHECK      1/9/2025    10:44 AM   Additional Questions   Roomed by Daly HALEY   Accompanied by self     HPI   Medication follow up on the adderall and patient is at work so send the link to her cell and she will step out to a private place to do visit.            Review of Systems  Constitutional, HEENT, cardiovascular, pulmonary, gi and gu systems are negative, except as otherwise noted.      Objective           Vitals:  No vitals were obtained today due to virtual visit.    Physical Exam   GENERAL: alert and no distress  EYES: Eyes grossly normal to inspection.  No discharge or erythema, or obvious scleral/conjunctival abnormalities.  RESP: No audible wheeze, cough, or visible cyanosis.    SKIN: Visible skin clear. No significant rash, abnormal pigmentation or lesions.  NEURO: Cranial nerves grossly intact.  Mentation and speech appropriate for age.  PSYCH: Appropriate affect, tone, and pace of words    No results found for any visits on 01/09/25.      Video-Visit Details    Type of service:  Video Visit   Originating Location (pt. Location): Home    Distant Location (provider location):   On-site  Platform used for Video Visit: Leatha  Signed Electronically by: ALFREDO Tapia CNP

## 2025-02-10 ENCOUNTER — VIRTUAL VISIT (OUTPATIENT)
Dept: NEUROLOGY | Facility: CLINIC | Age: 45
End: 2025-02-10
Payer: COMMERCIAL

## 2025-02-10 DIAGNOSIS — G43.E19 INTRACTABLE CHRONIC MIGRAINE WITH AURA AND WITHOUT STATUS MIGRAINOSUS: Primary | ICD-10-CM

## 2025-02-10 PROCEDURE — 98007 SYNCH AUDIO-VIDEO EST HI 40: CPT

## 2025-02-10 PROCEDURE — G2211 COMPLEX E/M VISIT ADD ON: HCPCS

## 2025-02-10 NOTE — PROGRESS NOTES
Virtual Visit Details    Type of service:  Video Visit     Originating Location (pt. Location): Home    Distant Location (provider location):  Off-site  Platform used for Video Visit: [facility]   Headache Neurology Consult    February 10, 2025     Pattie Easton MRN# [id number]   YOB: 1980 Age: 44 year old     Referring provider: Breanna Camilo          Assessment and Recommendations:     Pattie Easton is a 44 year old female who presents for further evaluation of headaches.     Her headache presentation meets criteria for chronic migraine with visual aura.  She also describes headache features which could represent right occipital neuralgia, though she has not responded to occipital nerve blocks in the past.    Her virtual neurologic examination is overall intact today.  Since headache onset, she has had imaging including MRA MRI brain, CTA head and neck, which have demonstrated likely infundibulum but no other structural or vascular cause for her headaches.    We discussed the following treatment strategy:  - For acute treatment of mild headache, she may use ibuprofen as needed, not to exceed 14 days per month to avoid medication overuse.   - For acute treatment of moderate to severe headache, she may use sumatriptan  mg taken at onset of headache with a repeat dose taken after 2 hours if needed. Use should not exceed 9 days per month to avoid medication overuse.  - For muscle tension with headache, she may try methocarbamol 500 mg as needed.     Her current frequency and severity of headaches warrant prevention.  - She has previously tried and failed gabapentin, nortriptyline. Potentially sedating medications such as topiramate will be avoided as she already struggles with fatigue. Antihypertensives will be avoided as her baseline blood pressure is low (90s/60s).  - I recommend a trial of botulinum toxin injections following a chronic migraine  protocol. General procedure, risks, side effects reviewed today. Will work to obtain prior authorization for this. Recommend a trial of 3 round prior to determining effectiveness.   - Alternatively, consider CGRP inhibitors.     Will plan to have patient return to the [facility] later this month to start injections but can consider transitioning to the [facility] as this may be more conveniently located for her.     I spent 40 minutes today on patient care including chart review, history, medical discussion, documentation, and care coordination.    Marti Cisse PA-C  Headache Neurology  [facility]            Chief Complaint:     Chief Complaint   Patient presents with    Headache           History is obtained from the patient and medical record.      Pattie Easton is a 44 year old female who presents for further evaluation of headaches. She is referred to the headache clinic to further discuss Botox injections for chronic migraine.    She reports she has had headaches for the past 3 years or more.  She describes headaches as located throughout the right side, or across the occiput bilaterally.  With headache, she will have associated muscle tension at the back of her neck.  Her headaches are a constant aching pain, with occasional severe sharp pain which radiates throughout the right side up on provocation of the occipital area.  She rates the intensity of her headaches as an 8-9/10.  She has some degree of headache constantly.  She can have visual aura which she describes as a kaleidoscope-like effect lasting about 30 minutes.  She denies nausea, but endorses phonophobia, photophobia.  Denies focal paresthesias or weakness, unilateral autonomic features, positional or exertional component, pulsatile tinnitus, dizziness, associated fevers or other illness.    Pattie currently experienced 30/30 headache days per month with 15+/30 severe headache days per month. Headaches last  greater than 4 hours in duration as she has some headache present constantly.     She currently manages headaches with ibuprofen as needed, as well as sumatriptan 50 mg.  She does not find sumatriptan to be very effective.  She was recently prescribed methocarbamol but has not yet started this.      Other muscle relaxants in the past have been too sedating.  She recalls trying occipital nerve blocks in the past without relief.  She has previously tried and failed gabapentin and nortriptyline for headache prophylaxis.  She did not tolerate these due to side effects.    Stress can be a headache trigger.    She feels as though she generally sleeps okay, but has vivid dreams so does not always feel well rested. Struggles with daytime fatigue  She drinks 1 cup of coffee in the morning.  Denies any known family history of headaches.    She tends to have lower blood pressure.    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    Current headache treatments:  Acute therapies:  - Ibuprofen   - Sumatriptan 50 mg - not super effective   - Methocarbamol     Preventative therapies:    Supportive therapies:    Previous treatments tried:  Acute therapies:  - Tizanidine - sedating  - Cyclobenzaprine - sedating     Preventative therapies:  - Gabapentin 300 mg - felt unstable  - Nortriptyline 25 mg - not effective, side effects (does not recall)  - Occipital nerve blocks - not effective     Supportive therapies:                Past Medical History:     Past Medical History:   Diagnosis Date    NO ACTIVE PROBLEMS               Past Surgical History:     Past Surgical History:   Procedure Laterality Date    ABDOMINOPLASTY, PANNICULECTOMY, COMBINED Bilateral 10/21/2022    Procedure: Cosmetic ABDOMINOPLASTY, Left Breast Dog Ear removal;  Surgeon: JUN Boland MD;  Location: [facility] OR    CYSTECTOMY PILONIDAL      HYSTERECTOMY, PAP NO LONGER INDICATED  2018    LASIK      LIPOSUCTION (LOCATION) Bilateral 10/21/2022     Procedure: Cosmetic Liposuction of Abdomen and Flanks;  Surgeon: JUN Boland MD;  Location: [facility] OR    MAMMOPLASTY REDUCTION BILATERAL Bilateral 2021    Procedure: MAMMOPLASTY, REDUCTION, BILATERAL;  Surgeon: JUN Boland MD;  Location: [facility] OR    NERVE BLOCK OCCIPITAL Bilateral 02/10/2022    Procedure: BLOCK, NERVE, OCCIPITAL;  Surgeon: Gómez Oliver MD;  Location: [facility] OR             Social History:     Social History     Socioeconomic History    Marital status:      Spouse name: Not on file    Number of children: Not on file    Years of education: Not on file    Highest education level: Not on file   Occupational History    Not on file   Tobacco Use    Smoking status: Former     Current packs/day: 0.00     Average packs/day: 0.1 packs/day for 14.0 years (1.4 ttl pk-yrs)     Types: Cigarettes     Start date: 10/11/2009     Quit date: 10/11/2023     Years since quittin.3     Passive exposure: Never    Smokeless tobacco: Former     Quit date: 10/26/2018    Tobacco comments:     Sporadic smoking prior to quitting.    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 Not Asked   Social History Narrative    Lives in [place] with [place] GREGG, and lives in [place] with her son - plans to move to [place].   Korrine smokes 2-3 cig/day.  No indoor cats/kittens.     Social Drivers of Health     Financial Resource Strain: Low Risk  (10/13/2023)    Financial Resource Strain     Within the past 12 months, have you or your family members you live with been unable to get utilities (heat, electricity) when it was really needed?: No   Food Insecurity: Low Risk  (10/13/2023)    Food Insecurity     Within the past 12 months, did you worry that your food would run out before you got money to buy more?: No     Within the past 12 months, did the food you bought just not last and you didn t have money to get more?: No   Transportation Needs: Low Risk  (10/13/2023)    Transportation Needs     Within the past 12 months, has lack of transportation kept you from medical appointments, getting your medicines, non-medical meetings or appointments, work, or from getting things that you need?: No   Physical Activity: Not on file   Stress: Not on file   Social Connections: Unknown (12/29/2021)    Received from [facility], [facility] & [facility]    Social Connections     Frequency of Communication with Friends and Family: Not on file   Interpersonal Safety: Low Risk  (10/13/2023)    Interpersonal Safety     Do you feel physically and emotionally safe where you currently live?: Yes     Within the past 12 months, have you been hit, slapped, kicked or otherwise physically hurt by someone?: No     Within the past 12 months, have you been humiliated or emotionally abused in other ways by your partner or ex-partner?: No   Housing Stability: Low Risk  (10/13/2023)    Housing Stability     Do you have housing? : Yes     Are you worried about losing your housing?: No             Family History:     Family History   Problem Relation Age of Onset    Alcohol/Drug Father     Hypertension Maternal Grandmother     Alcohol/Drug Sister     Glaucoma No family hx of     Macular Degeneration No family hx of               Allergies:    No Known Allergies          Medications:     Current Outpatient Medications:     amphetamine-dextroamphetamine (ADDERALL) 20 MG tablet, Take 1 tablet (20 mg) by mouth 2 times daily., Disp: 60 tablet, Rfl: 0    [START ON 2/21/2025] amphetamine-dextroamphetamine (ADDERALL) 20 MG tablet, Take 1 tablet (20 mg) by mouth 2 times daily., Disp: 60 tablet, Rfl: 0    [START ON 3/21/2025] amphetamine-dextroamphetamine (ADDERALL) 20 MG tablet, Take 1 tablet (20 mg) by mouth 2 times daily., Disp: 60 tablet, Rfl: 0    methocarbamol (ROBAXIN) 500 MG tablet, Take 1 tablet (500 mg) by mouth 4 times daily as needed for muscle spasms., Disp: 120 tablet, Rfl: 11    SUMAtriptan (IMITREX) 50 MG tablet, Take 1 tablet (50 mg) by mouth at onset of headache for migraine. May repeat in 2 hours. Max 4 tablets/24 hours., Disp: 18 tablet, Rfl: 11    VITAMIN D, CHOLECALCIFEROL, PO, Take by mouth daily, Disp: , Rfl:           Physical Exam:   No vitals were assessed today.    General: In no acute distress.  Neck: Normal range of motion with lateral head movements and neck flexion.  Eyes: No conjunctival injection, no scleral icterus.     Neurologic Exam:  Mental Status Exam: Alert, awake and oriented to situation. No dysarthria. Speech of normal fluency.  Cranial Nerves: EOMs intact, facial movements symmetric, hearing intact to conversation, tongue midline and fully mobile. No tongue atrophy or fasciculations.    Motor: No drift in upper extremities. Able to stand from a seated position without use of arms. No tremors or abnormal movements noted.  Coordination: With arms outstretched, able to touch nose using index finger accurately bilaterally. Normal finger tapping bilaterally.    Gait: Normal stance and gait.            Data:     MRA Brain (7/5/2022)  IMPRESSION:  1. Incidental persistent left trigeminal artery.  2. A 2 to 3 mm conically shaped outpouching is present at the left  anterior choroidal artery origin with  probable vessel emanating from  the tip. This presumably represents incidental infundibulum, however  given size aneurysm cannot be excluded.  3. A 1 to 2 mm outpouching of the right internal carotid artery just  proximal to the ophthalmic artery origin. This could represent  aneurysm or infundibulum.  4. 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  5. Consider CTA follow-up for further characterization.    MRI Brain with and without (7/5/2022)  IMPRESSION:  Unremarkable MRI of the head.    CTA Head Neck (2/10/23)  IMPRESSION:  1. Normal CTA of the neck.  2. 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  3. No definite intracranial aneurysm. Presumed infundibulum at the  posterior aspect of the left ICA terminus probably at the left  choroidal artery.  4. 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

## 2025-02-10 NOTE — NURSING NOTE
Current patient location: [address] [place] [id number]    Is the patient currently in the state of [place]? YES    Visit mode: VIDEO    If the visit is dropped, the patient can be reconnected by:TELEPHONE VISIT: Phone number:   Telephone Information:   Mobile [phone]       Will anyone else be joining the visit? NO  (If patient encounters technical issues they should call [phone]    Are changes needed to the allergy or medication list? Pt stated no med changes    Are refills needed on medications prescribed by this physician? NO    Rooming Documentation:  Questionnaire(s) not done per department protocol    Reason for visit: Headache    Johanne HINESF

## 2025-02-10 NOTE — LETTER
2/10/2025      Pattie Easton  [address]  [facility] [address]      Dear Colleague,    Thank you for referring your patient, Pattie Easton, to the [facility] NEUROLOGY CLINICS [facility]. Please see a copy of my visit note below.    Virtual Visit Details    Type of service:  Video Visit     Originating Location (pt. Location): Home    Distant Location (provider location):  Off-site  Platform used for Video Visit: [facility]   Headache Neurology Consult    February 10, 2025     Pattie Easton MRN# [id number]   YOB: 1980 Age: 44 year old     Referring provider: Breanna Camilo          Assessment and Recommendations:     Pattei Easton is a 44 year old female who presents for further evaluation of headaches.     Her headache presentation meets criteria for chronic migraine with visual aura.  She also describes headache features which could represent right occipital neuralgia, though she has not responded to occipital nerve blocks in the past.    Her virtual neurologic examination is overall intact today.  Since headache onset, she has had imaging including MRA MRI brain, CTA head and neck, which have demonstrated likely infundibulum but no other structural or vascular cause for her headaches.    We discussed the following treatment strategy:  - For acute treatment of mild headache, she may use ibuprofen as needed, not to exceed 14 days per month to avoid medication overuse.   - For acute treatment of moderate to severe headache, she may use sumatriptan  mg taken at onset of headache with a repeat dose taken after 2 hours if needed. Use should not exceed 9 days per month to avoid medication overuse.  - For muscle tension with headache, she may try methocarbamol 500 mg as needed.     Her current frequency and severity of headaches warrant prevention.  - She has previously tried and failed gabapentin, nortriptyline. Potentially sedating  medications such as topiramate will be avoided as she already struggles with fatigue. Antihypertensives will be avoided as her baseline blood pressure is low (90s/60s).  - I recommend a trial of botulinum toxin injections following a chronic migraine protocol. General procedure, risks, side effects reviewed today. Will work to obtain prior authorization for this. Recommend a trial of 3 round prior to determining effectiveness.   - Alternatively, consider CGRP inhibitors.     Will plan to have patient return to the [facility] later this month to start injections but can consider transitioning to the [facility] as this may be more conveniently located for her.     I spent 40 minutes today on patient care including chart review, history, medical discussion, documentation, and care coordination.    Marti Cisse PA-C  Headache Neurology  [facility] Neurology [facility]            Chief Complaint:     Chief Complaint   Patient presents with     Headache           History is obtained from the patient and medical record.      Pattie Easton is a 44 year old female who presents for further evaluation of headaches. She is referred to the headache clinic to further discuss Botox injections for chronic migraine.    She reports she has had headaches for the past 3 years or more.  She describes headaches as located throughout the right side, or across the occiput bilaterally.  With headache, she will have associated muscle tension at the back of her neck.  Her headaches are a constant aching pain, with occasional severe sharp pain which radiates throughout the right side up on provocation of the occipital area.  She rates the intensity of her headaches as an 8-9/10.  She has some degree of headache constantly.  She can have visual aura which she describes as a kaleidoscope-like effect lasting about 30 minutes.  She denies nausea, but endorses phonophobia, photophobia.  Denies focal paresthesias or weakness,  unilateral autonomic features, positional or exertional component, pulsatile tinnitus, dizziness, associated fevers or other illness.    She currently manages headaches with ibuprofen as needed, as well as sumatriptan 50 mg.  She does not find sumatriptan to be very effective.  She was recently prescribed methocarbamol but has not yet started this.      Other muscle relaxants in the past have been too sedating.  She recalls trying occipital nerve blocks in the past without relief.  She has previously tried and failed gabapentin and nortriptyline for headache prophylaxis.  She did not tolerate these due to side effects.    Stress can be a headache trigger.    She feels as though she generally sleeps okay, but has vivid dreams so does not always feel well rested. Struggles with daytime fatigue  She drinks 1 cup of coffee in the morning.  Denies any known family history of headaches.    She tends to have lower blood pressure.    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    Current headache treatments:  Acute therapies:  - Ibuprofen   - Sumatriptan 50 mg - not super effective   - Methocarbamol     Preventative therapies:    Supportive therapies:    Previous treatments tried:  Acute therapies:  - Tizanidine - sedating  - Cyclobenzaprine - sedating     Preventative therapies:  - Gabapentin 300 mg - felt unstable  - Nortriptyline 25 mg - not effective, side effects (does not recall)  - Occipital nerve blocks - not effective     Supportive therapies:                Past Medical History:     Past Medical History:   Diagnosis Date     NO ACTIVE PROBLEMS               Past Surgical History:     Past Surgical History:   Procedure Laterality Date     ABDOMINOPLASTY, PANNICULECTOMY, COMBINED Bilateral 10/21/2022    Procedure: Cosmetic ABDOMINOPLASTY, Left Breast Dog Ear removal;  Surgeon: JUN Boland MD;  Location: [facility] OR     CYSTECTOMY PILONIDAL       HYSTERECTOMY, PAP NO LONGER INDICATED  2018     LASIK        LIPOSUCTION (LOCATION) Bilateral 10/21/2022    Procedure: Cosmetic Liposuction of Abdomen and Flanks;  Surgeon: JUN Boland MD;  Location: [facility] OR     MAMMOPLASTY REDUCTION BILATERAL Bilateral 2021    Procedure: MAMMOPLASTY, REDUCTION, BILATERAL;  Surgeon: JUN Boland MD;  Location: [facility] OR     NERVE BLOCK OCCIPITAL Bilateral 02/10/2022    Procedure: BLOCK, NERVE, OCCIPITAL;  Surgeon: Gómez Oliver MD;  Location: [facility] OR             Social History:     Social History     Socioeconomic History     Marital status:      Spouse name: Not on file     Number of children: Not on file     Years of education: Not on file     Highest education level: Not on file   Occupational History     Not on file   Tobacco Use     Smoking status: Former     Current packs/day: 0.00     Average packs/day: 0.1 packs/day for 14.0 years (1.4 ttl pk-yrs)     Types: Cigarettes     Start date: 10/11/2009     Quit date: 10/11/2023     Years since quittin.3     Passive exposure: Never     Smokeless tobacco: Former     Quit date: 10/26/2018     Tobacco comments:     Sporadic smoking prior to quitting.    Vaping Use     Vaping status: Former     Substances: Nicotine, Flavoring     Devices: Disposable   Substance and Sexual Activity     Alcohol use: Not Currently     Drug use: No     Sexual activity: Yes     Partners: Male   Other Topics Concern      Service No     Blood Transfusions No     Caffeine Concern Yes     Comment: Advised not more than 16 ounces/day     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 Not Asked   Social History Narrative    Lives in  [place] with [place], and lives in [place] with her son - plans to move to [place].  Korrine smokes 2-3 cig/day.  No indoor cats/kittens.     Social Drivers of Health     Financial Resource Strain: Low Risk  (10/13/2023)    Financial Resource Strain      Within the past 12 months, have you or your family members you live with been unable to get utilities (heat, electricity) when it was really needed?: No   Food Insecurity: Low Risk  (10/13/2023)    Food Insecurity      Within the past 12 months, did you worry that your food would run out before you got money to buy more?: No      Within the past 12 months, did the food you bought just not last and you didn t have money to get more?: No   Transportation Needs: Low Risk  (10/13/2023)    Transportation Needs      Within the past 12 months, has lack of transportation kept you from medical appointments, getting your medicines, non-medical meetings or appointments, work, or from getting things that you need?: No   Physical Activity: Not on file   Stress: Not on file   Social Connections: Unknown (12/29/2021)    [facility]    Social Connections      Frequency of Communication with Friends and Family: Not on file   Interpersonal Safety: Low Risk  (10/13/2023)    Interpersonal Safety      Do you feel physically and emotionally safe where you currently live?: Yes      Within the past 12 months, have you been hit, slapped, kicked or otherwise physically hurt by someone?: No      Within the past 12 months, have you been humiliated or emotionally abused in other ways by your partner or ex-partner?: No   Housing Stability: Low Risk  (10/13/2023)    Housing Stability      Do you have housing? : Yes      Are you worried about losing your housing?: No             Family History:     Family History   Problem Relation Age of Onset     Alcohol/Drug Father      Hypertension Maternal Grandmother       Alcohol/Drug Sister      Glaucoma No family hx of      Macular Degeneration No family hx of              Allergies:    No Known Allergies          Medications:     Current Outpatient Medications:      amphetamine-dextroamphetamine (ADDERALL) 20 MG tablet, Take 1 tablet (20 mg) by mouth 2 times daily., Disp: 60 tablet, Rfl: 0     [START ON 2/21/2025] amphetamine-dextroamphetamine (ADDERALL) 20 MG tablet, Take 1 tablet (20 mg) by mouth 2 times daily., Disp: 60 tablet, Rfl: 0     [START ON 3/21/2025] amphetamine-dextroamphetamine (ADDERALL) 20 MG tablet, Take 1 tablet (20 mg) by mouth 2 times daily., Disp: 60 tablet, Rfl: 0     methocarbamol (ROBAXIN) 500 MG tablet, Take 1 tablet (500 mg) by mouth 4 times daily as needed for muscle spasms., Disp: 120 tablet, Rfl: 11     SUMAtriptan (IMITREX) 50 MG tablet, Take 1 tablet (50 mg) by mouth at onset of headache for migraine. May repeat in 2 hours. Max 4 tablets/24 hours., Disp: 18 tablet, Rfl: 11     VITAMIN D, CHOLECALCIFEROL, PO, Take by mouth daily, Disp: , Rfl:           Physical Exam:   No vitals were assessed today.    General: In no acute distress.  Neck: Normal range of motion with lateral head movements and neck flexion.  Eyes: No conjunctival injection, no scleral icterus.     Neurologic Exam:  Mental Status Exam: Alert, awake and oriented to situation. No dysarthria. Speech of normal fluency.  Cranial Nerves: EOMs intact, facial movements symmetric, hearing intact to conversation, tongue midline and fully mobile. No tongue atrophy or fasciculations.    Motor: No drift in upper extremities. Able to stand from a seated position without use of arms. No tremors or abnormal movements noted.  Coordination: With arms outstretched, able to touch nose using index finger accurately bilaterally. Normal finger tapping bilaterally.    Gait: Normal stance and gait.            Data:     MRA Brain (7/5/2022)  IMPRESSION:  1. Incidental persistent left trigeminal artery.  2.  A 2 to 3 mm conically shaped outpouching is present at the left  anterior choroidal artery origin with probable vessel emanating from  the tip. This presumably represents incidental infundibulum, however  given size aneurysm cannot be excluded.  3. A 1 to 2 mm outpouching of the right internal carotid artery just  proximal to the ophthalmic artery origin. This could represent  aneurysm or infundibulum.  4. 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  5. Consider CTA follow-up for further characterization.    MRI Brain with and without (7/5/2022)  IMPRESSION:  Unremarkable MRI of the head.    CTA Head Neck (2/10/23)  IMPRESSION:  1. Normal CTA of the neck.  2. 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  3. No definite intracranial aneurysm. Presumed infundibulum at the  posterior aspect of the left ICA terminus probably at the left  choroidal artery.  4. 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              Again, thank you for allowing me to participate in the care of your patient.        Sincerely,        ROSMERY VIGIL PA-C    Electronically signed

## 2025-02-20 ENCOUNTER — OFFICE VISIT (OUTPATIENT)
Dept: NEUROLOGY | Facility: CLINIC | Age: 45
End: 2025-02-20
Payer: COMMERCIAL

## 2025-02-20 DIAGNOSIS — G43.E19 INTRACTABLE CHRONIC MIGRAINE WITH AURA AND WITHOUT STATUS MIGRAINOSUS: Primary | ICD-10-CM

## 2025-02-20 DIAGNOSIS — R51.9 MIXED HEADACHE: ICD-10-CM

## 2025-02-20 NOTE — LETTER
"2/20/2025      Pattie Easton  [address] [facility] [address]      Dear Colleague,    Thank you for referring your patient, Pattie Easton, to the [facility] NEUROLOG[facility] CLINICS [facility]. Please see a copy of my visit note below.    [facility]  Botulinum Toxin Procedure    Marti Cisse PA-C  Headache Neurology    February 20, 2025    Procedure: OnabotulinumtoxinA injections for chronic migraine  Indication: Chronic migraine    Pattie Easton suffers from severe intractable headaches. She was referred by Marti Cisse PA-C for onabotulinumtoxinA injections for headache.      Headache description from initial encounter: \"She describes headaches as located throughout the right side, or across the occiput bilaterally.  With headache, she will have associated muscle tension at the back of her neck.  Her headaches are a constant aching pain, with occasional severe sharp pain which radiates throughout the right side up on provocation of the occipital area.  She rates the intensity of her headaches as an 8-9/10.  She has some degree of headache constantly. She can have visual aura which she describes as a kaleidoscope-like effect lasting about 30 minutes.  She denies nausea, but endorses phonophobia, photophobia.\"    Prior to initiation of botulinum toxin injections, Pattie reported 30/30 headache days per month with 15+/30 severe headache days per month. Her headaches are quite disabling and often interfere with her ability to function normally.    This is her first round of injections today.    She has attempted other migraine prophylactic treatments in the past, which have included: gabapentin, nortriptyline. Potentially sedating medications such as topiramate are avoided as she already struggles with fatigue. Antihypertensives are avoided as her baseline blood pressure is low (90s/60s).      She currently takes no other medications for headache prevention.    Patient's pain was assessed prior " to the procedure. She rated her pain today as low-moderate.      The procedure was explained to the patient. Benefits of the treatment were discussed including headache and migraine reduction. Risks of the procedure were reviewed including but not limited to pain, bruising, bleeding, infection, and weakness of muscles injected or those distal to injection sites. Alternatives were discussed. The patient voiced understanding of the risks and benefits. All questions answered and patient consented to proceed.    Procedural Pause: Procedural pause was conducted to verify correct patient identity, procedure to be performed, correct side and site, correct patient position, and special requirements. Appropriate hand hygiene was utilized, and each injection site was prepped with alcohol wipes or Chloraprep swab.     Procedure Details:   200 units of onabotulinumtoxinA was diluted in 4 mL 0.9% normal saline.   A total of 150 units of onabotulinumtoxinA were injected using 30 gauge 0.5 inch needles into the muscles listed below. 50 units of onabotulinumtoxinA were wasted.     Injection Sites: Total = 150 units onabotulinumtoxinA     Procerus muscles - 5 units into the procerus muscle (5 units total)     muscles - 5 units into the left  muscle and 5 units into the right  muscle (1 injection site per muscle) (10 units total)    Frontalis muscles - 5 units into the left superior frontalis muscle and 5 units into the right superior frontalis muscle (2 injection sites per muscle) (10 units total)    Temporalis muscles - 12.5 units into the left temporalis muscle and 12.5 units into the right temporalis muscle (2 injection sites per muscle) (25 units total)    Occipitalis muscles - 12.5 units into the left occipitalis muscle and 12.5 units into the right occipitalis muscle (2 injection sites per muscle) (25 units total)    Splenius Capitis muscles - 12.5 units into the left splenius capitis muscle and  12.5 units into the right splenius capitis muscle (2 injection sites per muscle, divided into 2/3 anteriorly and 1/3 posteriorly) (25 units total)      Trapezius muscles - 25 units into the left trapezius muscle and 25 units into the right trapezius muscle (3 injection sites per muscle, divided into 5 units, 10 units, 10 units, medial to lateral) (50 units total)      Patient tolerated the procedure well without immediate complications. She will follow up in clinic for assessment of the effectiveness of treatment. She did not report any change in her pain level after the botulinumtoxinA injection procedure.      Marti Cisse PA-C  Headache Neurology  [facility] Neurology [facility]      Again, thank you for allowing me to participate in the care of your patient.        Sincerely,        Marti Cisse PA-C    Electronically signed

## 2025-02-20 NOTE — PROGRESS NOTES
"[facility]  Botulinum Toxin Procedure    Marti Cisse PA-C  Headache Neurology    February 20, 2025    Procedure: OnabotulinumtoxinA injections for chronic migraine  Indication: Chronic migraine    Pattie Easton suffers from severe intractable headaches. She was referred by Marti Cisse PA-C for onabotulinumtoxinA injections for headache.      Headache description from initial encounter: \"She describes headaches as located throughout the right side, or across the occiput bilaterally.  With headache, she will have associated muscle tension at the back of her neck.  Her headaches are a constant aching pain, with occasional severe sharp pain which radiates throughout the right side up on provocation of the occipital area.  She rates the intensity of her headaches as an 8-9/10.  She has some degree of headache constantly. She can have visual aura which she describes as a kaleidoscope-like effect lasting about 30 minutes.  She denies nausea, but endorses phonophobia, photophobia.\"    Prior to initiation of botulinum toxin injections, Pattie reported 30/30 headache days per month with 15+/30 severe headache days per month. Her headaches are quite disabling and often interfere with her ability to function normally.    This is her first round of injections today.    She has attempted other migraine prophylactic treatments in the past, which have included: gabapentin, nortriptyline. Potentially sedating medications such as topiramate are avoided as she already struggles with fatigue. Antihypertensives are avoided as her baseline blood pressure is low (90s/60s).      She currently takes no other medications for headache prevention.    Patient's pain was assessed prior to the procedure. She rated her pain today as low-moderate.      The procedure was explained to the patient. Benefits of the treatment were discussed including headache and migraine reduction. Risks of the procedure were reviewed including but not " limited to pain, bruising, bleeding, infection, and weakness of muscles injected or those distal to injection sites. Alternatives were discussed. The patient voiced understanding of the risks and benefits. All questions answered and patient consented to proceed.    Procedural Pause: Procedural pause was conducted to verify correct patient identity, procedure to be performed, correct side and site, correct patient position, and special requirements. Appropriate hand hygiene was utilized, and each injection site was prepped with alcohol wipes or Chloraprep swab.     Procedure Details:   200 units of onabotulinumtoxinA was diluted in 4 mL 0.9% normal saline.   A total of 150 units of onabotulinumtoxinA were injected using 30 gauge 0.5 inch needles into the muscles listed below. 50 units of onabotulinumtoxinA were wasted.     Injection Sites: Total = 150 units onabotulinumtoxinA     Procerus muscles - 5 units into the procerus muscle (5 units total)     muscles - 5 units into the left  muscle and 5 units into the right  muscle (1 injection site per muscle) (10 units total)    Frontalis muscles - 5 units into the left superior frontalis muscle and 5 units into the right superior frontalis muscle (2 injection sites per muscle) (10 units total)    Temporalis muscles - 12.5 units into the left temporalis muscle and 12.5 units into the right temporalis muscle (2 injection sites per muscle) (25 units total)    Occipitalis muscles - 12.5 units into the left occipitalis muscle and 12.5 units into the right occipitalis muscle (2 injection sites per muscle) (25 units total)    Splenius Capitis muscles - 12.5 units into the left splenius capitis muscle and 12.5 units into the right splenius capitis muscle (2 injection sites per muscle, divided into 2/3 anteriorly and 1/3 posteriorly) (25 units total)      Trapezius muscles - 25 units into the left trapezius muscle and 25 units into the right trapezius  muscle (3 injection sites per muscle, divided into 5 units, 10 units, 10 units, medial to lateral) (50 units total)      Patient tolerated the procedure well without immediate complications. She will follow up in clinic for assessment of the effectiveness of treatment. She did not report any change in her pain level after the botulinumtoxinA injection procedure.      Marti Cisse PA-C  Headache Neurology  [facility] Neurology [facility]

## 2025-03-10 ENCOUNTER — PATIENT OUTREACH (OUTPATIENT)
Dept: CARE COORDINATION | Facility: CLINIC | Age: 45
End: 2025-03-10
Payer: COMMERCIAL

## 2025-04-03 ENCOUNTER — VIRTUAL VISIT (OUTPATIENT)
Dept: FAMILY MEDICINE | Facility: CLINIC | Age: 45
End: 2025-04-03
Payer: COMMERCIAL

## 2025-04-03 DIAGNOSIS — F90.2 ATTENTION DEFICIT HYPERACTIVITY DISORDER (ADHD), COMBINED TYPE: ICD-10-CM

## 2025-04-03 PROCEDURE — 98005 SYNCH AUDIO-VIDEO EST LOW 20: CPT | Performed by: NURSE PRACTITIONER

## 2025-04-03 RX ORDER — DEXTROAMPHETAMINE SACCHARATE, AMPHETAMINE ASPARTATE, DEXTROAMPHETAMINE SULFATE AND AMPHETAMINE SULFATE 5; 5; 5; 5 MG/1; MG/1; MG/1; MG/1
20 TABLET ORAL 2 TIMES DAILY
Qty: 60 TABLET | Refills: 0 | Status: SHIPPED | OUTPATIENT
Start: 2025-05-19 | End: 2025-06-18

## 2025-04-03 RX ORDER — DEXTROAMPHETAMINE SACCHARATE, AMPHETAMINE ASPARTATE, DEXTROAMPHETAMINE SULFATE AND AMPHETAMINE SULFATE 5; 5; 5; 5 MG/1; MG/1; MG/1; MG/1
20 TABLET ORAL 2 TIMES DAILY
Qty: 60 TABLET | Refills: 0 | Status: SHIPPED | OUTPATIENT
Start: 2025-04-19 | End: 2025-05-19

## 2025-04-03 RX ORDER — DEXTROAMPHETAMINE SACCHARATE, AMPHETAMINE ASPARTATE, DEXTROAMPHETAMINE SULFATE AND AMPHETAMINE SULFATE 5; 5; 5; 5 MG/1; MG/1; MG/1; MG/1
20 TABLET ORAL 2 TIMES DAILY
Qty: 60 TABLET | Refills: 0 | Status: SHIPPED | OUTPATIENT
Start: 2025-06-19 | End: 2025-07-19

## 2025-04-03 RX ORDER — DEXTROAMPHETAMINE SACCHARATE, AMPHETAMINE ASPARTATE, DEXTROAMPHETAMINE SULFATE AND AMPHETAMINE SULFATE 5; 5; 5; 5 MG/1; MG/1; MG/1; MG/1
20 TABLET ORAL 2 TIMES DAILY
Qty: 60 TABLET | Refills: 0 | Status: CANCELLED | OUTPATIENT
Start: 2025-04-03

## 2025-04-03 NOTE — PROGRESS NOTES
"Pattie is a 44 year old who is being evaluated via a billable video visit.    How would you like to obtain your AVS? MyChart  If the video visit is dropped, the invitation should be resent by: Text to cell phone: [phone]  Will anyone else be joining your video visit? No  {If patient encounters technical issues they should call [phone] :211171}    {PROVIDER CHARTING PREFERENCE:091550}    Subjective   Pattie is a 44 year old, presenting for the following health issues:  A.D.H.D        4/3/2025     2:02 PM   Additional Questions   Roomed by Emiliana MORTON      Medication Followup of ADHD  Taking Medication as prescribed: yes  Side Effects:  None  Medication Helping Symptoms:  yes  {additonal problems for provider to add (Optional):916049}    {ROS Picklists (Optional):024889}      Objective           Vitals:  No vitals were obtained today due to virtual visit.    Physical Exam   {video visit exam brief selected:178999}    {Diagnostic Test Results (Optional):446295}      Video-Visit Details    Type of service:  Video Visit   Originating Location (pt. Location): {video visit patient location:544923::\"Home\"}  {PROVIDER LOCATION On-site should be selected for visits conducted from your clinic location or adjoining [facility] hospital, academic office, or other nearby [facility] building. Off-site should be selected for all other provider locations, including home:868269}  Distant Location (provider location):  {virtual location provider:336525}  Platform used for Video Visit: {Virtual Visit Platforms:737135::\"BUKA\"}  Signed Electronically by: ALFREDO aTpia CNP  {Email feedback regarding this note to [email or web address]   :614544}  " significant rash, abnormal pigmentation or lesions.  NEURO: Cranial nerves grossly intact.  Mentation and speech appropriate for age.  PSYCH: Appropriate affect, tone, and pace of words          Video-Visit Details    Type of service:  Video Visit   Originating Location (pt. Location): Home    Distant Location (provider location):  On-site  Platform used for Video Visit: Aixa  Signed Electronically by: ALFREDO Tapia CNP

## 2025-04-04 ENCOUNTER — TELEPHONE (OUTPATIENT)
Dept: NEUROLOGY | Facility: CLINIC | Age: 45
End: 2025-04-04
Payer: COMMERCIAL

## 2025-04-04 NOTE — LETTER
April 24, 2025      Pattie Easton  [address] [address] [address]        Dear Pattie,     We attempted to reach you about your insurance.     At this time [employer] insurance is still showing up as active.  We have verified benefits for [employer] O[employer] and [employer] already.  No PA is required for [employer] and patient meets policy.  Please let me know when the BCBS OOS expires and I can update the insurance note on the referral.      So it looks like everything should be good to go and the only thing needed would be the BCBS OOS expiration date.      If possible could you provide this when able? You can reach us at [phone] if you have any additional questions.         Sincerely,        [facility] [place].

## 2025-04-04 NOTE — TELEPHONE ENCOUNTER
[facility] Call Center    Phone Message    May a detailed message be left on voicemail: yes     Reason for Call: Other: Pattie calling to request a call back regarding her next botox appointment on 5/15/25. Pattie stated that she is dropping BCBS and only having [employer] Insurance and is inquiring if a prior authorization would be needed and would like to be proactive.     Action Taken: Message routed to:  Other: [facility]    Travel Screening: Not Applicable     Date of Service:

## 2025-04-04 NOTE — TELEPHONE ENCOUNTER
[facility] Call Center    Phone Message    May a detailed message be left on voicemail: yes     Reason for Call: Other:       Pt calling in due to receiving a phone call from a nurse that she needed to upload a copy of her new insurance card (front and back), so a prior authorization could be done. Pt states she did upload that insurance to a [employer] message. The insurance card can be found in the [employer] message that the patient sent in on 02/07/2025.     Pt's call back # [phone] if any further action needs to be taken.  Action Taken: Message routed to:  Other: [facility]    Travel Screening: Not Applicable                                                                          08-Nov-2024

## 2025-04-23 NOTE — TELEPHONE ENCOUNTER
Attempted to contact patient regarding [employer] message sent and message below. LM for callback.    BCBS OOS insurance is still showing up as active.  We have verified benefits for [employer] and [employer] already.  No PA is required for [employer] and patient meets policy.  Please let me know when the BCBS OOS expires and I can update the insurance note on the referral.        So it looks like everything should be good to go and the only thing needed would be the BCBS OOS expiration date.      If possible could you provide this when able?    Graciela Cross MA  [facility]

## 2025-04-24 NOTE — TELEPHONE ENCOUNTER
Unable to reach patient at this time. Letter sent to patient.    Graciela Cross MA  [facility]

## 2025-04-28 ENCOUNTER — TELEPHONE (OUTPATIENT)
Dept: NEUROLOGY | Facility: CLINIC | Age: 45
End: 2025-04-28
Payer: COMMERCIAL

## 2025-04-28 NOTE — TELEPHONE ENCOUNTER
[facility] Call Center    Phone Message    May a detailed message be left on voicemail: yes     Reason for Call: Other: Patient would like a call back to inform her of why she received a voicemail on 04/23/2025. Patient can be reached at     Action Taken: Message routed to:  Other: [facility]    Travel Screening: Not Applicable

## 2025-04-29 NOTE — TELEPHONE ENCOUNTER
Called patient to find out exactly what phone call is she referring to and advise.     Patient states that she received a voicemail on 4/23 stating for her to give our office a call back.     Advised patient that on our end, I do not see that anything is needed from her at this time. Also, patient Botox has already been approved and she is good to go for her appointment so far with Marti Cisse PA-C on 5/15/2025.    Patient understands and just wanted to check and make sure everything is okay.     Advised patient to contact our clinic at [phone] if any other questions.       Etelvina Daly MA

## 2025-05-15 ENCOUNTER — OFFICE VISIT (OUTPATIENT)
Dept: NEUROLOGY | Facility: CLINIC | Age: 45
End: 2025-05-15
Payer: COMMERCIAL

## 2025-05-15 DIAGNOSIS — G43.E19 INTRACTABLE CHRONIC MIGRAINE WITH AURA AND WITHOUT STATUS MIGRAINOSUS: Primary | ICD-10-CM

## 2025-05-15 NOTE — PROGRESS NOTES
"[facility]  Botulinum Toxin Procedure    Marti Cisse PA-C  Headache Neurology    May 15, 2025    Procedure: OnabotulinumtoxinA injections for chronic migraine  Indication: Chronic migraine    Pattie Easton suffers from severe intractable headaches. She was referred by Marti Cisse PA-C for onabotulinumtoxinA injections for headache.       Headache description from initial encounter: \"She describes headaches as located throughout the right side, or across the occiput bilaterally.  With headache, she will have associated muscle tension at the back of her neck.  Her headaches are a constant aching pain, with occasional severe sharp pain which radiates throughout the right side up on provocation of the occipital area.  She rates the intensity of her headaches as an 8-9/10.  She has some degree of headache constantly. She can have visual aura which she describes as a kaleidoscope-like effect lasting about 30 minutes.  She denies nausea, but endorses phonophobia, photophobia.\"     Prior to initiation of botulinum toxin injections, Pattie reported 30/30 headache days per month with 15+/30 severe headache days per month. Her headaches are quite disabling and often interfere with her ability to function normally.    Date of last injections: 2/20/2025 (this is her 2nd round of injections today)  Date of last office visit: 2/10/2025 - Marti Cisse PA-C    Pattie reports 25/30 headache days per month currently, with 4-8 severe headache days per month. She has noticed a wearing off phenomenon prior to this round of botulinum toxin injections, lasting 2-3 weeks.     Pattie reports the following benefits of botulinum toxin injections from their last round: Headaches are no longer affecting her whole head. She has been able to have some headache free days. She recently started at a new job. She can tell that the headaches overall are less intense.     She has attempted other migraine prophylactic treatments in the " past, which have included: gabapentin, nortriptyline. Potentially sedating medications such as topiramate are avoided as she already struggles with fatigue. Antihypertensives are avoided as her baseline blood pressure is low (90s/60s).       She currently takes no other medications for headache prevention.    Patient's pain was assessed prior to the procedure. She rated her pain today as 4 out of 10.      The procedure was explained to the patient. Benefits of the treatment were discussed including headache and migraine reduction. Risks of the procedure were reviewed including but not limited to pain, bruising, bleeding, infection, and weakness of muscles injected or those distal to injection sites. Alternatives were discussed. The patient voiced understanding of the risks and benefits. All questions answered and patient consented to proceed.    Procedural Pause: Procedural pause was conducted to verify correct patient identity, procedure to be performed, correct side and site, correct patient position, and special requirements. Appropriate hand hygiene was utilized, and each injection site was prepped with alcohol wipes or Chloraprep swab.     Procedure Details:   200 units of onabotulinumtoxinA was diluted in 4 mL 0.9% normal saline.   A total of 150 units of onabotulinumtoxinA were injected using 30 gauge 0.5 inch needles into the muscles listed below. 50 units of onabotulinumtoxinA were wasted.     Injection Sites: Total = 150 units onabotulinumtoxinA     Procerus muscles - 5 units into the procerus muscle (5 units total)     muscles - 5 units into the left  muscle and 5 units into the right  muscle (1 injection site per muscle) (10 units total)    Frontalis muscles - 5 units into the left superior frontalis muscle and 5 units into the right superior frontalis muscle (2 injection sites per muscle) (10 units total)    Temporalis muscles - 12.5 units into the left temporalis muscle  and 12.5 units into the right temporalis muscle (2 injection sites per muscle) (25 units total)    Occipitalis muscles - 12.5 units into the left occipitalis muscle and 12.5 units into the right occipitalis muscle (2 injection sites per muscle) (25 units total)    Splenius Capitis muscles - 12.5 units into the left splenius capitis muscle and 12.5 units into the right splenius capitis muscle (2 injection sites per muscle, divided into 2/3 anteriorly and 1/3 posteriorly) (25 units total)      Trapezius muscles - 25 units into the left trapezius muscle and 25 units into the right trapezius muscle (3 injection sites per muscle, divided into 5 units, 10 units, 10 units, medial to lateral) (50 units total)      Patient tolerated the procedure well without immediate complications. She will follow up in clinic for assessment of the effectiveness of treatment. She did not report any change in her pain level after the botulinumtoxinA injection procedure.      Marti Cisse PA-C  Headache Neurology  [facility]

## 2025-05-15 NOTE — LETTER
"5/15/2025      Pattie Easton  [address] [address] [address]      Dear Colleague,    Thank you for referring your patient, Pattie Easton, to the [facility]. Please see a copy of my visit note below.    [facility]  Botulinum Toxin Procedure    Marti Cisse PA-C  Headache Neurology    May 15, 2025    Procedure: OnabotulinumtoxinA injections for chronic migraine  Indication: Chronic migraine    Pattie Easton suffers from severe intractable headaches. She was referred by Marti Csise PA-C for onabotulinumtoxinA injections for headache.       Headache description from initial encounter: \"She describes headaches as located throughout the right side, or across the occiput bilaterally.  With headache, she will have associated muscle tension at the back of her neck.  Her headaches are a constant aching pain, with occasional severe sharp pain which radiates throughout the right side up on provocation of the occipital area.  She rates the intensity of her headaches as an 8-9/10.  She has some degree of headache constantly. She can have visual aura which she describes as a kaleidoscope-like effect lasting about 30 minutes.  She denies nausea, but endorses phonophobia, photophobia.\"     Prior to initiation of botulinum toxin injections, Pattie reported 30/30 headache days per month with 15+/30 severe headache days per month. Her headaches are quite disabling and often interfere with her ability to function normally.    Date of last injections: 2/20/2025 (this is her 2nd round of injections today)  Date of last office visit: 2/10/2025 - Mrati Cisse PA-C    Pattie reports 25/30 headache days per month currently, with 4-8 severe headache days per month. She has noticed a wearing off phenomenon prior to this round of botulinum toxin injections, lasting 2-3 weeks.     Pattie reports the following benefits of botulinum toxin injections from their last round: Headaches are " no longer affecting her whole head. She has been able to have some headache free days. She recently started at a new job. She can tell that the headaches overall are less intense.     She has attempted other migraine prophylactic treatments in the past, which have included: gabapentin, nortriptyline. Potentially sedating medications such as topiramate are avoided as she already struggles with fatigue. Antihypertensives are avoided as her baseline blood pressure is low (90s/60s).       She currently takes no other medications for headache prevention.    Patient's pain was assessed prior to the procedure. She rated her pain today as 4 out of 10.      The procedure was explained to the patient. Benefits of the treatment were discussed including headache and migraine reduction. Risks of the procedure were reviewed including but not limited to pain, bruising, bleeding, infection, and weakness of muscles injected or those distal to injection sites. Alternatives were discussed. The patient voiced understanding of the risks and benefits. All questions answered and patient consented to proceed.    Procedural Pause: Procedural pause was conducted to verify correct patient identity, procedure to be performed, correct side and site, correct patient position, and special requirements. Appropriate hand hygiene was utilized, and each injection site was prepped with alcohol wipes or Chloraprep swab.     Procedure Details:   200 units of onabotulinumtoxinA was diluted in 4 mL 0.9% normal saline.   A total of 150 units of onabotulinumtoxinA were injected using 30 gauge 0.5 inch needles into the muscles listed below. 50 units of onabotulinumtoxinA were wasted.     Injection Sites: Total = 150 units onabotulinumtoxinA     Procerus muscles - 5 units into the procerus muscle (5 units total)     muscles - 5 units into the left  muscle and 5 units into the right  muscle (1 injection site per muscle) (10 units  total)    Frontalis muscles - 5 units into the left superior frontalis muscle and 5 units into the right superior frontalis muscle (2 injection sites per muscle) (10 units total)    Temporalis muscles - 12.5 units into the left temporalis muscle and 12.5 units into the right temporalis muscle (2 injection sites per muscle) (25 units total)    Occipitalis muscles - 12.5 units into the left occipitalis muscle and 12.5 units into the right occipitalis muscle (2 injection sites per muscle) (25 units total)    Splenius Capitis muscles - 12.5 units into the left splenius capitis muscle and 12.5 units into the right splenius capitis muscle (2 injection sites per muscle, divided into 2/3 anteriorly and 1/3 posteriorly) (25 units total)      Trapezius muscles - 25 units into the left trapezius muscle and 25 units into the right trapezius muscle (3 injection sites per muscle, divided into 5 units, 10 units, 10 units, medial to lateral) (50 units total)      Patient tolerated the procedure well without immediate complications. She will follow up in clinic for assessment of the effectiveness of treatment. She did not report any change in her pain level after the botulinumtoxinA injection procedure.      Marti Cisse PA-C  Headache Neurology  [facility]      Again, thank you for allowing me to participate in the care of your patient.        Sincerely,        Marti Cisse PA-C    Electronically signed

## 2025-05-25 ENCOUNTER — NURSE TRIAGE (OUTPATIENT)
Dept: NURSING | Facility: CLINIC | Age: 45
End: 2025-05-25
Payer: COMMERCIAL

## 2025-05-25 ENCOUNTER — HOSPITAL ENCOUNTER (EMERGENCY)
Facility: CLINIC | Age: 45
Discharge: HOME | End: 2025-05-25
Attending: PHYSICIAN ASSISTANT | Admitting: PHYSICIAN ASSISTANT
Payer: COMMERCIAL

## 2025-05-25 VITALS
DIASTOLIC BLOOD PRESSURE: 74 MMHG | HEART RATE: 111 BPM | SYSTOLIC BLOOD PRESSURE: 125 MMHG | TEMPERATURE: 97.9 F | OXYGEN SATURATION: 98 % | RESPIRATION RATE: 16 BRPM

## 2025-05-25 DIAGNOSIS — H66.91 ACUTE RIGHT OTITIS MEDIA: ICD-10-CM

## 2025-05-25 PROCEDURE — 99203 OFFICE O/P NEW LOW 30 MIN: CPT | Performed by: PHYSICIAN ASSISTANT

## 2025-05-25 PROCEDURE — G0463 HOSPITAL OUTPT CLINIC VISIT: HCPCS | Performed by: PHYSICIAN ASSISTANT

## 2025-05-25 RX ORDER — FLUTICASONE PROPIONATE 50 MCG
1 SPRAY, SUSPENSION (ML) NASAL DAILY
Qty: 16 G | Refills: 0 | Status: SHIPPED | OUTPATIENT
Start: 2025-05-25

## 2025-05-25 RX ORDER — BENZONATATE 100 MG/1
100 CAPSULE ORAL 3 TIMES DAILY PRN
Qty: 42 CAPSULE | Refills: 0 | Status: SHIPPED | OUTPATIENT
Start: 2025-05-25 | End: 2025-06-24

## 2025-05-25 RX ORDER — ALBUTEROL SULFATE 90 UG/1
2 INHALANT RESPIRATORY (INHALATION) EVERY 4 HOURS PRN
Qty: 18 G | Refills: 0 | Status: SHIPPED | OUTPATIENT
Start: 2025-05-25

## 2025-05-25 ASSESSMENT — COLUMBIA-SUICIDE SEVERITY RATING SCALE - C-SSRS
2. HAVE YOU ACTUALLY HAD ANY THOUGHTS OF KILLING YOURSELF IN THE PAST MONTH?: NO
6. HAVE YOU EVER DONE ANYTHING, STARTED TO DO ANYTHING, OR PREPARED TO DO ANYTHING TO END YOUR LIFE?: NO
1. IN THE PAST MONTH, HAVE YOU WISHED YOU WERE DEAD OR WISHED YOU COULD GO TO SLEEP AND NOT WAKE UP?: NO

## 2025-05-25 NOTE — TELEPHONE ENCOUNTER
Illness going around the family. Yesterday right ear drum, can't hear anything. Looked at the ear drum, it's red, right side. Can it clear up on its own? 7:20 a.m. appointment on Wednesday. Can it wait until then? She is going to go to urgent care today.    Tiffanie Smith RN  [facility]    Reason for Disposition   Earache  (Exceptions: brief ear pain of < 60 minutes duration, earache occurring during air travel    Additional Information   Negative: Moving the earlobe or touching the ear clearly increases the pain   Negative: Foreign body stuck in the ear (e.g., bug, piece of cotton)   Negative: Followed an ear injury   Negative: [1] Recently diagnosed with otitis media AND [2] currently on oral antibiotics   Negative: [1] Stiff neck (can't touch chin to chest) AND [2] fever   Negative: [1] Bony area of skull behind the ear is pink or swollen AND [2] fever   Negative: Fever > 104 F (40 C)   Negative: Patient sounds very sick or weak to the triager   Negative: [1] SEVERE pain AND [2] not improved 2 hours after taking analgesic medication (e.g., ibuprofen or acetaminophen)     Pain at 2   Negative: Walking is very unsteady or feels very dizzy   Negative: Sudden onset of ear pain after long - thin object was inserted into the ear canal (e.g., pencil, Q-tip)   Negative: Diabetes mellitus or weak immune system (e.g., HIV positive, cancer chemo, splenectomy, organ transplant, chronic steroids)   Negative: New blurred vision or vision changes   Negative: White, yellow, or green discharge   Negative: Bloody discharge or unexplained bleeding from ear canal    Protocols used: Earache-A-AH

## 2025-05-25 NOTE — ED PROVIDER NOTES
History     Chief Complaint   Patient presents with    Ear Fullness     HPI  Pattie Easton is a 44 year old female who presents to urgent care with concern over 2 to 3-day history of right ear pain, pressure.  Patient developed URI symptoms approximately 1 week ago including sore throat, nasal congestion, cough.  Last 2 days she has had ear discomfort.  She denies any current fever, myalgias, dyspnea, wheezing, vomiting, diarrhea, abdominal pain.  She has attempted some OTC cold medications without relief.      Allergies:  No Known Allergies    Problem List:    Patient Active Problem List    Diagnosis Date Noted    Excess skin of abdomen 08/15/2022     Priority: Medium     Added automatically from request for surgery [id number]      Bilateral occipital neuralgia 02/03/2022     Priority: Medium     Added automatically from request for surgery [id number]      Hypertrophy of breast 11/02/2021     Priority: Medium     Added automatically from request for surgery [id number]          Past Medical History:    Past Medical History:   Diagnosis Date    NO ACTIVE PROBLEMS        Past Surgical History:    Past Surgical History:   Procedure Laterality Date    ABDOMINOPLASTY, PANNICULECTOMY, COMBINED Bilateral 10/21/2022    Procedure: Cosmetic ABDOMINOPLASTY, Left Breast Dog Ear removal;  Surgeon: JUN Boland MD;  Location: [facility] OR    CYSTECTOMY PILONIDAL      HYSTERECTOMY, PAP NO LONGER INDICATED  2018    LASIK      LIPOSUCTION (LOCATION) Bilateral 10/21/2022    Procedure: Cosmetic Liposuction of Abdomen and Flanks;  Surgeon: JUN Boland MD;  Location: [facility] OR    MAMMOPLASTY REDUCTION BILATERAL Bilateral 11/05/2021    Procedure: MAMMOPLASTY, REDUCTION, BILATERAL;  Surgeon: JUN Boland MD;  Location: [facility] OR    NERVE BLOCK OCCIPITAL Bilateral 02/10/2022    Procedure: BLOCK, NERVE, OCCIPITAL;  Surgeon: Gómez Oliver MD;  Location: [facility] OR       Family History:    Family History   Problem Relation Age of  Onset    Alcohol/Drug Father     Hypertension Maternal Grandmother     Alcohol/Drug Sister     Glaucoma No family hx of     Macular Degeneration No family hx of        Social History:  Marital Status:   [2]  Social History     Tobacco Use    Smoking status: Former     Current packs/day: 0.00     Average packs/day: 0.1 packs/day for 14.0 years (1.4 ttl pk-yrs)     Types: Cigarettes     Start date: 10/11/2009     Quit date: 10/11/2023     Years since quittin.6     Passive exposure: Never    Smokeless tobacco: Former     Quit date: 10/26/2018    Tobacco comments:     Sporadic smoking prior to quitting.    Vaping Use    Vaping status: Former    Substances: Nicotine, Flavoring    Devices: Disposable   Substance Use Topics    Alcohol use: Not Currently    Drug use: No      Medications:    albuterol (PROAIR HFA/PROVENTIL HFA/VENTOLIN HFA) 108 (90 Base) MCG/ACT inhaler  amoxicillin-clavulanate (AUGMENTIN) 875-125 MG tablet  benzonatate (TESSALON) 100 MG capsule  fluticasone (FLONASE) 50 MCG/ACT nasal spray  amphetamine-dextroamphetamine (ADDERALL) 20 MG tablet  [START ON 2025] amphetamine-dextroamphetamine (ADDERALL) 20 MG tablet  methocarbamol (ROBAXIN) 500 MG tablet  SUMAtriptan (IMITREX) 50 MG tablet  VITAMIN D, CHOLECALCIFEROL, PO      Review of Systems  CONSTITUTIONAL:NEGATIVE for fever, chills, change in weight  INTEGUMENTARY/SKIN: NEGATIVE for worrisome rashes, moles or lesions  EYES: NEGATIVE for vision changes or irritation  ENT/MOUTH: POSITIVE for right ear pain, improved sore throat, nasal congestion   RESP:POSITIVE for cough and NEGATIVE for dyspnea, wheezing   GI: NEGATIVE for nausea, vomiting, diarrhea, abdominal pain   Physical Exam   BP: 125/74  Pulse: 111  Temp: 97.9  F (36.6  C)  Resp: 16  SpO2: 98 %  Physical Exam  GENERAL APPEARANCE: healthy, alert and no distress  EYES: EOMI,  PERRL, conjunctiva clear  HENT: ear canals are clear, right TM is erythematous, left TM has some serous fluid  present.   Nose and mouth without ulcers, erythema or lesions  NECK: supple, nontender, no lymphadenopathy  RESP: lungs clear to auscultation - no rales, rhonchi or wheezes  CV: regular rates and rhythm, normal S1 S2, no murmur noted  SKIN: no suspicious lesions or rashes  ED Course        Procedures       Critical Care time:  none  None       Medications - No data to display    Assessments & Plan (with Medical Decision Making)     I have reviewed the nursing notes.    I have reviewed the findings, diagnosis, plan and need for follow up with the patient.       Discharge Medication List as of 5/25/2025  1:46 PM        START taking these medications    Details   albuterol (PROAIR HFA/PROVENTIL HFA/VENTOLIN HFA) 108 (90 Base) MCG/ACT inhaler Inhale 2 puffs into the lungs every 4 hours as needed for shortness of breath, wheezing or cough., Disp-18 g, R-0, E-PrescribePharmacy may dispense brand covered by insurance (Proair, or proventil or ventolin or generic albuterol inhaler)      amoxicillin-clavulanate (AUGMENTIN) 875-125 MG tablet Take 1 tablet by mouth 2 times daily for 7 days., Disp-14 tablet, R-0, E-Prescribe      benzonatate (TESSALON) 100 MG capsule Take 1 capsule (100 mg) by mouth 3 times daily as needed for cough., Disp-42 capsule, R-0, E-Prescribe      fluticasone (FLONASE) 50 MCG/ACT nasal spray Spray 1 spray into both nostrils daily., Disp-16 g, R-0, E-Prescribe           Final diagnoses:   Acute right otitis media     44-year-old female presents to urgent care with concern over 2 to 3-day history of right ear pain and pressure which was preceded by URI symptoms including sore throat, nasal congestion, cough.  Physical exam findings do appear consistent with acute otitis media of the right ear.  She was discharged home with Augmentin and instructions for symptomatic treatment with Flonase, albuterol, Tessalon.  Follow-up if no improvement in the next 3 days. Worrisome reasons to return to ER/UC sooner  discussed.     Disclaimer: This note consists of symbols derived from keyboarding, dictation, and/or voice recognition software. As a result, there may be errors in the script that have gone undetected.  Please consider this when interpreting information found in the chart.      5/25/2025   [facility] EMERGENCY DEPT       Kristin Blanco PA-C  05/28/25 2030

## 2025-06-12 ENCOUNTER — PATIENT OUTREACH (OUTPATIENT)
Dept: CARE COORDINATION | Facility: CLINIC | Age: 45
End: 2025-06-12
Payer: COMMERCIAL

## 2025-06-23 ENCOUNTER — OFFICE VISIT (OUTPATIENT)
Dept: FAMILY MEDICINE | Facility: CLINIC | Age: 45
End: 2025-06-23
Payer: COMMERCIAL

## 2025-06-23 VITALS
DIASTOLIC BLOOD PRESSURE: 78 MMHG | RESPIRATION RATE: 20 BRPM | WEIGHT: 174 LBS | OXYGEN SATURATION: 98 % | HEIGHT: 69 IN | BODY MASS INDEX: 25.77 KG/M2 | TEMPERATURE: 98.5 F | SYSTOLIC BLOOD PRESSURE: 108 MMHG | HEART RATE: 94 BPM

## 2025-06-23 DIAGNOSIS — F90.2 ATTENTION DEFICIT HYPERACTIVITY DISORDER (ADHD), COMBINED TYPE: ICD-10-CM

## 2025-06-23 DIAGNOSIS — Z00.00 ROUTINE GENERAL MEDICAL EXAMINATION AT A HEALTH CARE FACILITY: Primary | ICD-10-CM

## 2025-06-23 DIAGNOSIS — N95.1 MENOPAUSAL SYNDROME (HOT FLASHES): ICD-10-CM

## 2025-06-23 LAB
ALBUMIN SERPL BCG-MCNC: 4.3 G/DL (ref 3.5–5.2)
ALP SERPL-CCNC: 72 U/L (ref 40–150)
ALT SERPL W P-5'-P-CCNC: 22 U/L (ref 0–50)
ANION GAP SERPL CALCULATED.3IONS-SCNC: 9 MMOL/L (ref 7–15)
AST SERPL W P-5'-P-CCNC: 17 U/L (ref 0–45)
BILIRUB SERPL-MCNC: 0.3 MG/DL
BUN SERPL-MCNC: 6.6 MG/DL (ref 6–20)
CALCIUM SERPL-MCNC: 9.4 MG/DL (ref 8.8–10.4)
CHLORIDE SERPL-SCNC: 105 MMOL/L (ref 98–107)
CHOLEST SERPL-MCNC: 190 MG/DL
CREAT SERPL-MCNC: 0.62 MG/DL (ref 0.51–0.95)
EGFRCR SERPLBLD CKD-EPI 2021: >90 ML/MIN/1.73M2
ERYTHROCYTE [DISTWIDTH] IN BLOOD BY AUTOMATED COUNT: 12 % (ref 10–15)
FASTING STATUS PATIENT QL REPORTED: NORMAL
FASTING STATUS PATIENT QL REPORTED: NORMAL
GLUCOSE SERPL-MCNC: 90 MG/DL (ref 70–99)
HCO3 SERPL-SCNC: 26 MMOL/L (ref 22–29)
HCT VFR BLD AUTO: 37.6 % (ref 35–47)
HDLC SERPL-MCNC: 81 MG/DL
HGB BLD-MCNC: 12.6 G/DL (ref 11.7–15.7)
LDLC SERPL CALC-MCNC: 93 MG/DL
MCH RBC QN AUTO: 32.1 PG (ref 26.5–33)
MCHC RBC AUTO-ENTMCNC: 33.5 G/DL (ref 31.5–36.5)
MCV RBC AUTO: 96 FL (ref 78–100)
NONHDLC SERPL-MCNC: 109 MG/DL
PLATELET # BLD AUTO: 302 10E3/UL (ref 150–450)
POTASSIUM SERPL-SCNC: 4 MMOL/L (ref 3.4–5.3)
PROT SERPL-MCNC: 7.4 G/DL (ref 6.4–8.3)
RBC # BLD AUTO: 3.93 10E6/UL (ref 3.8–5.2)
SODIUM SERPL-SCNC: 140 MMOL/L (ref 135–145)
TRIGL SERPL-MCNC: 79 MG/DL
TSH SERPL DL<=0.005 MIU/L-ACNC: 2.3 UIU/ML (ref 0.3–4.2)
WBC # BLD AUTO: 7 10E3/UL (ref 4–11)

## 2025-06-23 PROCEDURE — 1125F AMNT PAIN NOTED PAIN PRSNT: CPT | Performed by: NURSE PRACTITIONER

## 2025-06-23 PROCEDURE — 3074F SYST BP LT 130 MM HG: CPT | Performed by: NURSE PRACTITIONER

## 2025-06-23 PROCEDURE — 99213 OFFICE O/P EST LOW 20 MIN: CPT | Mod: 25 | Performed by: NURSE PRACTITIONER

## 2025-06-23 PROCEDURE — 85027 COMPLETE CBC AUTOMATED: CPT | Performed by: NURSE PRACTITIONER

## 2025-06-23 PROCEDURE — 83002 ASSAY OF GONADOTROPIN (LH): CPT | Performed by: NURSE PRACTITIONER

## 2025-06-23 PROCEDURE — 84443 ASSAY THYROID STIM HORMONE: CPT | Performed by: NURSE PRACTITIONER

## 2025-06-23 PROCEDURE — 3048F LDL-C <100 MG/DL: CPT | Performed by: NURSE PRACTITIONER

## 2025-06-23 PROCEDURE — 99396 PREV VISIT EST AGE 40-64: CPT | Performed by: NURSE PRACTITIONER

## 2025-06-23 PROCEDURE — 3078F DIAST BP <80 MM HG: CPT | Performed by: NURSE PRACTITIONER

## 2025-06-23 PROCEDURE — 83001 ASSAY OF GONADOTROPIN (FSH): CPT | Performed by: NURSE PRACTITIONER

## 2025-06-23 PROCEDURE — 82670 ASSAY OF TOTAL ESTRADIOL: CPT | Performed by: NURSE PRACTITIONER

## 2025-06-23 PROCEDURE — 80053 COMPREHEN METABOLIC PANEL: CPT | Performed by: NURSE PRACTITIONER

## 2025-06-23 PROCEDURE — 80061 LIPID PANEL: CPT | Performed by: NURSE PRACTITIONER

## 2025-06-23 PROCEDURE — 36415 COLL VENOUS BLD VENIPUNCTURE: CPT | Performed by: NURSE PRACTITIONER

## 2025-06-23 RX ORDER — DEXTROAMPHETAMINE SACCHARATE, AMPHETAMINE ASPARTATE, DEXTROAMPHETAMINE SULFATE AND AMPHETAMINE SULFATE 5; 5; 5; 5 MG/1; MG/1; MG/1; MG/1
20 TABLET ORAL 2 TIMES DAILY
Qty: 60 TABLET | Refills: 0 | Status: SHIPPED | OUTPATIENT
Start: 2025-08-16 | End: 2025-09-15

## 2025-06-23 RX ORDER — DEXTROAMPHETAMINE SACCHARATE, AMPHETAMINE ASPARTATE, DEXTROAMPHETAMINE SULFATE AND AMPHETAMINE SULFATE 5; 5; 5; 5 MG/1; MG/1; MG/1; MG/1
20 TABLET ORAL 2 TIMES DAILY
Qty: 60 TABLET | Refills: 0 | Status: CANCELLED | OUTPATIENT
Start: 2025-06-23

## 2025-06-23 RX ORDER — DEXTROAMPHETAMINE SACCHARATE, AMPHETAMINE ASPARTATE, DEXTROAMPHETAMINE SULFATE AND AMPHETAMINE SULFATE 5; 5; 5; 5 MG/1; MG/1; MG/1; MG/1
20 TABLET ORAL 2 TIMES DAILY
Qty: 60 TABLET | Refills: 0 | Status: SHIPPED | OUTPATIENT
Start: 2025-09-16 | End: 2025-10-16

## 2025-06-23 RX ORDER — DEXTROAMPHETAMINE SACCHARATE, AMPHETAMINE ASPARTATE, DEXTROAMPHETAMINE SULFATE AND AMPHETAMINE SULFATE 5; 5; 5; 5 MG/1; MG/1; MG/1; MG/1
20 TABLET ORAL 2 TIMES DAILY
Qty: 60 TABLET | Refills: 0 | Status: SHIPPED | OUTPATIENT
Start: 2025-07-16 | End: 2025-08-15

## 2025-06-23 SDOH — HEALTH STABILITY: PHYSICAL HEALTH: ON AVERAGE, HOW MANY DAYS PER WEEK DO YOU ENGAGE IN MODERATE TO STRENUOUS EXERCISE (LIKE A BRISK WALK)?: 2 DAYS

## 2025-06-23 SDOH — HEALTH STABILITY: PHYSICAL HEALTH: ON AVERAGE, HOW MANY MINUTES DO YOU ENGAGE IN EXERCISE AT THIS LEVEL?: 30 MIN

## 2025-06-23 ASSESSMENT — PAIN SCALES - GENERAL: PAINLEVEL_OUTOF10: MILD PAIN (2)

## 2025-06-23 ASSESSMENT — SOCIAL DETERMINANTS OF HEALTH (SDOH): HOW OFTEN DO YOU GET TOGETHER WITH FRIENDS OR RELATIVES?: ONCE A WEEK

## 2025-06-23 NOTE — PATIENT INSTRUCTIONS
Patient Education   Preventive Care Advice   This is general advice given by our system to help you stay healthy. However, your care team may have specific advice just for you. Please talk to your care team about your preventive care needs.  Nutrition  Eat 5 or more servings of fruits and vegetables each day.  Try wheat bread, brown rice and whole grain pasta (instead of white bread, rice, and pasta).  Get enough calcium and vitamin D. Check the label on foods and aim for 100% of the RDA (recommended daily allowance).  Lifestyle  Exercise at least 150 minutes each week  (30 minutes a day, 5 days a week).  Do muscle strengthening activities 2 days a week. These help control your weight and prevent disease.  No smoking.  Wear sunscreen to prevent skin cancer.  Have a dental exam and cleaning every 6 months.  Yearly exams  See your health care team every year to talk about:  Any changes in your health.  Any medicines your care team has prescribed.  Preventive care, family planning, and ways to prevent chronic diseases.  Shots (vaccines)   HPV shots (up to age 26), if you've never had them before.  Hepatitis B shots (up to age 59), if you've never had them before.  COVID-19 shot: Get this shot when it's due.  Flu shot: Get a flu shot every year.  Tetanus shot: Get a tetanus shot every 10 years.  Pneumococcal, hepatitis A, and RSV shots: Ask your care team if you need these based on your risk.  Shingles shot (for age 50 and up)  General health tests  Diabetes screening:  Starting at age 35, Get screened for diabetes at least every 3 years.  If you are younger than age 35, ask your care team if you should be screened for diabetes.  Cholesterol test: At age 39, start having a cholesterol test every 5 years, or more often if advised.  Bone density scan (DEXA): At age 50, ask your care team if you should have this scan for osteoporosis (brittle bones).  Hepatitis C: Get tested at least once in your life.  STIs (sexually  transmitted infections)  Before age 24: Ask your care team if you should be screened for STIs.  After age 24: Get screened for STIs if you're at risk. You are at risk for STIs (including HIV) if:  You are sexually active with more than one person.  You don't use condoms every time.  You or a partner was diagnosed with a sexually transmitted infection.  If you are at risk for HIV, ask about PrEP medicine to prevent HIV.  Get tested for HIV at least once in your life, whether you are at risk for HIV or not.  Cancer screening tests  Cervical cancer screening: If you have a cervix, begin getting regular cervical cancer screening tests starting at age 21.  Breast cancer scan (mammogram): If you've ever had breasts, begin having regular mammograms starting at age 40. This is a scan to check for breast cancer.  Colon cancer screening: It is important to start screening for colon cancer at age 45.  Have a colonoscopy test every 10 years (or more often if you're at risk) Or, ask your provider about stool tests like a FIT test every year or Cologuard test every 3 years.  To learn more about your testing options, visit:   .  For help making a decision, visit:   https://bit.ly/vj62834.  Prostate cancer screening test: If you have a prostate, ask your care team if a prostate cancer screening test (PSA) at age 55 is right for you.  Lung cancer screening: If you are a current or former smoker ages 50 to 80, ask your care team if ongoing lung cancer screenings are right for you.  For informational purposes only. Not to replace the advice of your health care provider. Copyright   2023 [facility]. All rights reserved. Clinically reviewed by the [facility] Transitions Program. [employer] 809406 - REV 01/24.  Learning About Stress  What is stress?     Stress is your body's response to a hard situation. Your body can have a physical, emotional, or mental response. Stress is a fact of life for most people, and it  affects everyone differently. What causes stress for you may not be stressful for someone else.  A lot of things can cause stress. You may feel stress when you go on a job interview, take a test, or run a race. This kind of short-term stress is normal and even useful. It can help you if you need to work hard or react quickly. For example, stress can help you finish an important job on time.  Long-term stress is caused by ongoing stressful situations or events. Examples of long-term stress include long-term health problems, ongoing problems at work, or conflicts in your family. Long-term stress can harm your health.  How does stress affect your health?  When you are stressed, your body responds as though you are in danger. It makes hormones that speed up your heart, make you breathe faster, and give you a burst of energy. This is called the fight-or-flight stress response. If the stress is over quickly, your body goes back to normal and no harm is done.  But if stress happens too often or lasts too long, it can have bad effects. Long-term stress can make you more likely to get sick, and it can make symptoms of some diseases worse. If you tense up when you are stressed, you may develop neck, shoulder, or low back pain. Stress is linked to high blood pressure and heart disease.  Stress also harms your emotional health. It can make you jimenez, tense, or depressed. Your relationships may suffer, and you may not do well at work or school.  What can you do to manage stress?  You can try these things to help manage stress:   Do something active. Exercise or activity can help reduce stress. Walking is a great way to get started. Even everyday activities such as housecleaning or yard work can help.  Try yoga or saloni chi. These techniques combine exercise and meditation. You may need some training at first to learn them.  Do something you enjoy. For example, listen to music or go to a movie. Practice your hobby or do volunteer  "work.  Meditate. This can help you relax, because you are not worrying about what happened before or what may happen in the future.  Do guided imagery. Imagine yourself in any setting that helps you feel calm. You can use online videos, books, or a teacher to guide you.  Do breathing exercises. For example:  From a standing position, bend forward from the waist with your knees slightly bent. Let your arms dangle close to the floor.  Breathe in slowly and deeply as you return to a standing position. Roll up slowly and lift your head last.  Hold your breath for just a few seconds in the standing position.  Breathe out slowly and bend forward from the waist.  Let your feelings out. Talk, laugh, cry, and express anger when you need to. Talking with supportive friends or family, a counselor, or a elissa leader about your feelings is a healthy way to relieve stress. Avoid discussing your feelings with people who make you feel worse.  Write. It may help to write about things that are bothering you. This helps you find out how much stress you feel and what is causing it. When you know this, you can find better ways to cope.  What can you do to prevent stress?  You might try some of these things to help prevent stress:  Manage your time. This helps you find time to do the things you want and need to do.  Get enough sleep. Your body recovers from the stresses of the day while you are sleeping.  Get support. Your family, friends, and community can make a difference in how you experience stress.  Limit your news feed. Avoid or limit time on social media or news that may make you feel stressed.  Do something active. Exercise or activity can help reduce stress. Walking is a great way to get started.  Where can you learn more?  Go to https://www.[employer].net/patiented  Enter N032 in the search box to learn more about \"Learning About Stress.\"  Current as of: October 24, 2024  Content Version: 14.5 2024-2025 [employer]   Care instructions adapted under license by your healthcare professional. If you have questions about a medical condition or this instruction, always ask your healthcare professional. [employer] disclaims any warranty or liability for your use of this information.

## 2025-06-23 NOTE — PROGRESS NOTES
Preventive Care Visit  [facility]  ALFREDO Tapia CNP, Family Medicine  Jun 23, 2025      Assessment & Plan     Routine general medical examination at a health care facility  Labs pending - TSH with free T4 reflex; Future  - Comprehensive metabolic panel (BMP + Alb, Alk Phos, ALT, AST, Total. Bili, TP); Future  - CBC with platelets; Future  - Lipid panel reflex to direct LDL Fasting; Future  - Lipid panel reflex to direct LDL Fasting  - CBC with platelets  - Comprehensive metabolic panel (BMP + Alb, Alk Phos, ALT, AST, Total. Bili, TP)  - TSH with free T4 reflex    Attention deficit hyperactivity disorder (ADHD), combined type  Stable renewed Adderall  - amphetamine-dextroamphetamine (ADDERALL) 20 MG tablet; Take 1 tablet (20 mg) by mouth 2 times daily.  - amphetamine-dextroamphetamine (ADDERALL) 20 MG tablet; Take 1 tablet (20 mg) by mouth 2 times daily.  - amphetamine-dextroamphetamine (ADDERALL) 20 MG tablet; Take 1 tablet (20 mg) by mouth 2 times daily.    Menopausal syndrome (hot flashes)  Patient concerned with fatigue and menopausal symptoms will obtain labs and have her follow-up with OB/GYN prior workups for fatigue were within normal limits  - Follicle stimulating hormone; Future  - Estradiol; Future  - Luteinizing Hormone; Future  - Ob/Gyn  Referral; Future  - Luteinizing Hormone  - Estradiol  - Follicle stimulating hormone    Patient has been advised of split billing requirements and indicates understanding: Yes        Nicotine/Tobacco Cessation  She reports that she has been smoking cigarettes. She started smoking about 15 years ago. She has a 1.4 pack-year smoking history. She has never been exposed to tobacco smoke. She quit smokeless tobacco use about 6 years ago.  Nicotine/Tobacco Cessation Plan  Information offered: Patient not interested at this time      BMI  Estimated body mass index is 25.7 kg/m  as calculated from the following:    Height as of this  "encounter: 1.753 m (5' 9\").    Weight as of this encounter: 78.9 kg (174 lb).   Weight management plan: Discussed healthy diet and exercise guidelines  Reviewed preventive health counseling, as reflected in patient instructions       Regular exercise       Healthy diet/nutrition       Vision screening       Hearing screening       Osteoporosis prevention/bone health  Counseling  Appropriate preventive services were addressed with this patient via screening, questionnaire, or discussion as appropriate for fall prevention, nutrition, physical activity, Tobacco-use cessation, social engagement, weight loss and cognition.  Checklist reviewing preventive services available has been given to the patient.  Reviewed patient's diet, addressing concerns and/or questions.   She is at risk for lack of exercise and has been provided with information to increase physical activity for the benefit of her well-being.   She is at risk for psychosocial distress and has been provided with information to reduce risk.         Follow-up    Follow-up Visit   Expected date:  Jun 23, 2026 (Approximate)      Follow Up Appointment Details:     Follow-up with whom?: PCP    Follow-Up for what?: Adult Preventive    How?: In Person                 Collins Blankenship is a 44 year old, presenting for the following:  Physical        6/23/2025     2:42 PM   Additional Questions   Roomed by [facility]         Advance Care Planning    Health Care Directive received at today's visit.  Forwarded to [employer].        6/23/2025   General Health   How would you rate your overall physical health? (!) FAIR   Feel stress (tense, anxious, or unable to sleep) Rather much   (!) STRESS CONCERN      6/23/2025   Nutrition   Three or more servings of calcium each day? (!) I DON'T KNOW   Diet: Regular (no restrictions)   How many servings of fruit and vegetables per day? (!) 2-3   How many sweetened beverages each day? 0-1         6/23/2025   Exercise "   Days per week of moderate/strenous exercise 2 days   Average minutes spent exercising at this level 30 min   (!) EXERCISE CONCERN      2025   Social Factors   Frequency of gathering with friends or relatives Once a week   Worry food won't last until get money to buy more No   Food not last or not have enough money for food? No   Do you have housing? (Housing is defined as stable permanent housing and does not include staying outside in a car, in a tent, in an abandoned building, in an overnight shelter, or couch-surfing.) Yes   Are you worried about losing your housing? No   Lack of transportation? No   Unable to get utilities (heat,electricity)? No         2025   Dental   Dentist two times every year? Yes           Today's PHQ-2 Score:       2/10/2025     9:42 AM   PHQ-2 (  [employer])   Q1: Little interest or pleasure in doing things 0   Q2: Feeling down, depressed or hopeless 1   PHQ-2 Score 1         2025   Substance Use   Alcohol more than 3/day or more than 7/wk No   Do you use any other substances recreationally? No     Social History     Tobacco Use    Smoking status: Every Day     Current packs/day: 0.00     Average packs/day: 0.1 packs/day for 14.0 years (1.4 ttl pk-yrs)     Types: Cigarettes     Start date: 10/11/2009     Last attempt to quit: 10/11/2023     Years since quittin.7     Passive exposure: Never    Smokeless tobacco: Former     Quit date: 10/26/2018    Tobacco comments:     Sporadic smoking prior to quitting.    Vaping Use    Vaping status: Former    Substances: Nicotine, Flavoring    Devices: Disposable   Substance Use Topics    Alcohol use: Not Currently    Drug use: No           2024   LAST FHS-7 RESULTS   1st degree relative breast or ovarian cancer No   Any relative bilateral breast cancer No   Any male have breast cancer No   Any ONE woman have BOTH breast AND ovarian cancer No   Any woman with breast cancer before 50yrs No   2 or more relatives with breast  AND/OR ovarian cancer No   2 or more relatives with breast AND/OR bowel cancer No        Mammogram Screening - Mammogram every 1-2 years updated in Health Maintenance based on mutual decision making        6/23/2025   STI Screening   New sexual partner(s) since last STI/HIV test? No     History of abnormal Pap smear: Status post hysterectomy with removal of cervix and no history of CIN2 or greater or cervical cancer. Health Maintenance and Surgical History updated.        6/29/2011     9:15 AM   PAP / HPV   PAP (Historical) NIL      ASCVD Risk   The 10-year ASCVD risk score (Irma DK, et al., 2019) is: 1.3%    Values used to calculate the score:      Age: 44 years      Sex: Female      Is Non- : No      Diabetic: No      Tobacco smoker: Yes      Systolic Blood Pressure: 108 mmHg      Is BP treated: No      HDL Cholesterol: 61 mg/dL      Total Cholesterol: 188 mg/dL       Reviewed and updated as needed this visit by Provider                    BP Readings from Last 3 Encounters:   06/23/25 108/78   05/25/25 125/74   01/09/24 95/67    Wt Readings from Last 3 Encounters:   06/23/25 78.9 kg (174 lb)   01/09/24 87.5 kg (192 lb 12.8 oz)   10/13/23 87.5 kg (193 lb)                  Patient Active Problem List   Diagnosis    Hypertrophy of breast    Bilateral occipital neuralgia    Excess skin of abdomen     Past Surgical History:   Procedure Laterality Date    ABDOMINOPLASTY, PANNICULECTOMY, COMBINED Bilateral 10/21/2022    Procedure: Cosmetic ABDOMINOPLASTY, Left Breast Dog Ear removal;  Surgeon: JUN Boland MD;  Location: [facility] OR    CYSTECTOMY PILONIDAL      HYSTERECTOMY, PAP NO LONGER INDICATED  2018    LASIK      LIPOSUCTION (LOCATION) Bilateral 10/21/2022    Procedure: Cosmetic Liposuction of Abdomen and Flanks;  Surgeon: JUN Boland MD;  Location: [facility] OR    MAMMOPLASTY REDUCTION BILATERAL Bilateral 11/05/2021    Procedure: MAMMOPLASTY, REDUCTION, BILATERAL;   "Surgeon: JUN Boland MD;  Location: [facility] OR    NERVE BLOCK OCCIPITAL Bilateral 02/10/2022    Procedure: BLOCK, NERVE, OCCIPITAL;  Surgeon: Gómez Oliver MD;  Location: [facility] OR       Social History     Tobacco Use    Smoking status: Every Day     Current packs/day: 0.00     Average packs/day: 0.1 packs/day for 14.0 years (1.4 ttl pk-yrs)     Types: Cigarettes     Start date: 10/11/2009     Last attempt to quit: 10/11/2023     Years since quittin.7     Passive exposure: Never    Smokeless tobacco: Former     Quit date: 10/26/2018    Tobacco comments:     Sporadic smoking prior to quitting.    Substance Use Topics    Alcohol use: Not Currently     Family History   Problem Relation Age of Onset    Alcohol/Drug Father     Hypertension Maternal Grandmother     Alcohol/Drug Sister     Glaucoma No family hx of     Macular Degeneration No family hx of          Current Outpatient Medications   Medication Sig Dispense Refill    amphetamine-dextroamphetamine (ADDERALL) 20 MG tablet Take 1 tablet (20 mg) by mouth 2 times daily. 60 tablet 0    methocarbamol (ROBAXIN) 500 MG tablet Take 1 tablet (500 mg) by mouth 4 times daily as needed for muscle spasms. 120 tablet 11    SUMAtriptan (IMITREX) 50 MG tablet Take 1 tablet (50 mg) by mouth at onset of headache for migraine. May repeat in 2 hours. Max 4 tablets/24 hours. 18 tablet 11    VITAMIN D, CHOLECALCIFEROL, PO Take by mouth daily (Patient not taking: Reported on 2025)       No Known Allergies  Recent Labs   Lab Test 10/13/23  1408 21  1711   *  --    HDL 61  --    TRIG 57  --    ALT 20 24   CR 0.67 0.61   GFRESTIMATED >90 >90   GFRESTBLACK  --  >90   POTASSIUM 4.1 3.8   TSH 1.63 1.42          Review of Systems  Constitutional, HEENT, cardiovascular, pulmonary, gi and gu systems are negative, except as otherwise noted.     Objective    Exam  /78   Pulse 94   Temp 98.5  F (36.9  C) (Tympanic)   Resp 20   Ht 1.753 m (5' 9\")   Wt 78.9 kg " "(174 lb)   LMP  (LMP Unknown)   SpO2 98%   BMI 25.70 kg/m     Estimated body mass index is 25.7 kg/m  as calculated from the following:    Height as of this encounter: 1.753 m (5' 9\").    Weight as of this encounter: 78.9 kg (174 lb).    Physical Exam  GENERAL: alert and no distress  EYES: Eyes grossly normal to inspection, PERRL and conjunctivae and sclerae normal  HENT: ear canals and TM's normal, nose and mouth without ulcers or lesions  NECK: no adenopathy, no asymmetry, masses, or scars  RESP: lungs clear to auscultation - no rales, rhonchi or wheezes  CV: regular rate and rhythm, normal S1 S2, no S3 or S4, no murmur, click or rub, no peripheral edema  MS: no gross musculoskeletal defects noted, no edema  SKIN: no suspicious lesions or rashes  NEURO: Normal strength and tone, mentation intact and speech normal  PSYCH: mentation appears normal, affect normal/bright        Signed Electronically by: ALFREDO Tapia CNP    "

## 2025-06-24 ENCOUNTER — PATIENT OUTREACH (OUTPATIENT)
Dept: CARE COORDINATION | Facility: CLINIC | Age: 45
End: 2025-06-24
Payer: COMMERCIAL

## 2025-06-24 ENCOUNTER — RESULTS FOLLOW-UP (OUTPATIENT)
Dept: FAMILY MEDICINE | Facility: CLINIC | Age: 45
End: 2025-06-24

## 2025-06-24 LAB
ESTRADIOL SERPL-MCNC: 60 PG/ML
FSH SERPL IRP2-ACNC: 4.7 MIU/ML
LH SERPL-ACNC: 7.9 MIU/ML

## 2025-06-26 ENCOUNTER — PATIENT OUTREACH (OUTPATIENT)
Dept: CARE COORDINATION | Facility: CLINIC | Age: 45
End: 2025-06-26
Payer: COMMERCIAL

## 2025-08-07 ENCOUNTER — OFFICE VISIT (OUTPATIENT)
Dept: NEUROLOGY | Facility: CLINIC | Age: 45
End: 2025-08-07
Payer: COMMERCIAL

## 2025-08-07 DIAGNOSIS — G43.E19 INTRACTABLE CHRONIC MIGRAINE WITH AURA AND WITHOUT STATUS MIGRAINOSUS: Primary | ICD-10-CM

## (undated) DEVICE — NDL 19GA 1.5"

## (undated) DEVICE — SU PDS II 0 CT-1 27" Z340H

## (undated) DEVICE — DRSG KERLIX 4 1/2"X4YDS ROLL 6715

## (undated) DEVICE — GLOVE PROTEXIS BLUE W/NEU-THERA 7.0  2D73EB70

## (undated) DEVICE — PACK MINOR SBA15MIFSE

## (undated) DEVICE — NDL COUNTER 20CT 31142493

## (undated) DEVICE — SOL NACL 0.9% INJ 1000ML BAG 07983-09

## (undated) DEVICE — BLADE KNIFE SURG 10 371110

## (undated) DEVICE — SOL WATER IRRIG 1000ML BOTTLE 07139-09

## (undated) DEVICE — ESU ELEC BLADE HEX-LOCKING 2.5" E1450X

## (undated) DEVICE — SPONGE LAP 18X18" 1515

## (undated) DEVICE — DRSG TEGADERM 2 3/8X2 3/4" 1624W

## (undated) DEVICE — DRAPE SPLIT EENT 76X124" 3X28" 9447

## (undated) DEVICE — SU STRATAFIX MONOCRYL 3-0 SPIRAL SH 23CM SXMP1B427

## (undated) DEVICE — PREP CHLORAPREP 26ML TINTED ORANGE  260815

## (undated) DEVICE — DECANTER TRANSFER DEVICE 2008S

## (undated) DEVICE — STPL SKIN 35W 054887

## (undated) DEVICE — SU ETHIBOND 0 CT-1 CR 8X18" CX21D

## (undated) DEVICE — SUCTION TIP YANKAUER W/O VENT K86

## (undated) DEVICE — CLOSURE SYS SKIN PREMIERPRO EXOFINFUSION 4X60CM 3473

## (undated) DEVICE — SU MONOCRYL 2-0 SH 27" UND Y417H

## (undated) DEVICE — SU STRATAFIX 2-0 MH 36" SXPD2B412

## (undated) DEVICE — DRAPE SHEET HALF 40X60" 9358

## (undated) DEVICE — SU STRATAFIX MONOCRYL 3-0 SPIRAL PS-2 45CM SXMP1B107

## (undated) DEVICE — SU MONOCRYL 4-0 PS-2 18" UND Y496G

## (undated) DEVICE — TRAY PAIN INJECTION 97A 640

## (undated) DEVICE — BNDG ELASTIC 6" DBL LENGTH UNSTERILE 6611-16

## (undated) DEVICE — MARKER SKIN DOUBLE TIP W/FLEXI-RULER W/LABELS

## (undated) DEVICE — COVER ULTRASOUND PROBE W/GEL FLEXI-FEEL 6"X58" LF  25-FF658

## (undated) DEVICE — CLIP APPLIER 11" MED LIGACLIP MCM30

## (undated) DEVICE — DRAPE IOBAN INCISE 23X17" 6650EZ

## (undated) DEVICE — ESU PENCIL SMOKE EVAC W/ROCKER SWITCH 0703-047-000

## (undated) DEVICE — BNDG ABDOMINAL BINDER 09X46-62"

## (undated) DEVICE — GLOVE PROTEXIS W/NEU-THERA 7.0  2D73TE70

## (undated) DEVICE — SYR BULB IRRIG DOVER 60 ML LATEX FREE 67000

## (undated) DEVICE — SYR 10ML LL W/O NDL

## (undated) DEVICE — PREP CHLORAPREP W/ORANGE TINT 10.5ML 930715

## (undated) DEVICE — SU VICRYL 2-0 TIE 12X18" J905T

## (undated) DEVICE — ESU PENCIL W/SMOKE EVAC E2515HS

## (undated) DEVICE — GLOVE PROTEXIS W/NEU-THERA 8.0  2D73TE80

## (undated) RX ORDER — LIDOCAINE HYDROCHLORIDE 20 MG/ML
INJECTION, SOLUTION INFILTRATION; PERINEURAL
Status: DISPENSED
Start: 2022-10-21

## (undated) RX ORDER — LIDOCAINE HYDROCHLORIDE 10 MG/ML
INJECTION, SOLUTION EPIDURAL; INFILTRATION; INTRACAUDAL; PERINEURAL
Status: DISPENSED
Start: 2022-10-21

## (undated) RX ORDER — EPHEDRINE SULFATE 50 MG/ML
INJECTION, SOLUTION INTRAMUSCULAR; INTRAVENOUS; SUBCUTANEOUS
Status: DISPENSED
Start: 2022-10-21

## (undated) RX ORDER — ACETAMINOPHEN 325 MG/1
TABLET ORAL
Status: DISPENSED
Start: 2021-11-05

## (undated) RX ORDER — GINSENG 100 MG
CAPSULE ORAL
Status: DISPENSED
Start: 2022-10-21

## (undated) RX ORDER — EPHEDRINE SULFATE 50 MG/ML
INJECTION, SOLUTION INTRAMUSCULAR; INTRAVENOUS; SUBCUTANEOUS
Status: DISPENSED
Start: 2021-11-05

## (undated) RX ORDER — GLYCOPYRROLATE 0.2 MG/ML
INJECTION, SOLUTION INTRAMUSCULAR; INTRAVENOUS
Status: DISPENSED
Start: 2021-11-05

## (undated) RX ORDER — OXYCODONE HYDROCHLORIDE 5 MG/1
TABLET ORAL
Status: DISPENSED
Start: 2021-11-05

## (undated) RX ORDER — PROPOFOL 10 MG/ML
INJECTION, EMULSION INTRAVENOUS
Status: DISPENSED
Start: 2022-10-21

## (undated) RX ORDER — FENTANYL CITRATE 50 UG/ML
INJECTION, SOLUTION INTRAMUSCULAR; INTRAVENOUS
Status: DISPENSED
Start: 2021-11-05

## (undated) RX ORDER — ACETAMINOPHEN 325 MG/1
TABLET ORAL
Status: DISPENSED
Start: 2022-10-21

## (undated) RX ORDER — DEXAMETHASONE SODIUM PHOSPHATE 4 MG/ML
INJECTION, SOLUTION INTRA-ARTICULAR; INTRALESIONAL; INTRAMUSCULAR; INTRAVENOUS; SOFT TISSUE
Status: DISPENSED
Start: 2022-10-21

## (undated) RX ORDER — CEFAZOLIN SODIUM 1 G/3ML
INJECTION, POWDER, FOR SOLUTION INTRAMUSCULAR; INTRAVENOUS
Status: DISPENSED
Start: 2022-10-21

## (undated) RX ORDER — PROPOFOL 10 MG/ML
INJECTION, EMULSION INTRAVENOUS
Status: DISPENSED
Start: 2021-11-05

## (undated) RX ORDER — FENTANYL CITRATE 50 UG/ML
INJECTION, SOLUTION INTRAMUSCULAR; INTRAVENOUS
Status: DISPENSED
Start: 2022-10-21

## (undated) RX ORDER — BUPIVACAINE HYDROCHLORIDE 2.5 MG/ML
INJECTION, SOLUTION INFILTRATION; PERINEURAL
Status: DISPENSED
Start: 2021-11-05

## (undated) RX ORDER — DEXAMETHASONE SODIUM PHOSPHATE 10 MG/ML
INJECTION, SOLUTION INTRAMUSCULAR; INTRAVENOUS
Status: DISPENSED
Start: 2022-02-10

## (undated) RX ORDER — KETAMINE HYDROCHLORIDE 50 MG/ML
INJECTION, SOLUTION INTRAMUSCULAR; INTRAVENOUS
Status: DISPENSED
Start: 2021-11-05

## (undated) RX ORDER — HEPARIN SODIUM 5000 [USP'U]/.5ML
INJECTION, SOLUTION INTRAVENOUS; SUBCUTANEOUS
Status: DISPENSED
Start: 2022-10-21

## (undated) RX ORDER — CEFAZOLIN SODIUM 2 G/100ML
INJECTION, SOLUTION INTRAVENOUS
Status: DISPENSED
Start: 2021-11-05

## (undated) RX ORDER — EPINEPHRINE 1 MG/ML
INJECTION, SOLUTION INTRAMUSCULAR; SUBCUTANEOUS
Status: DISPENSED
Start: 2022-10-21

## (undated) RX ORDER — ERYTHROMYCIN 5 MG/G
OINTMENT OPHTHALMIC
Status: DISPENSED
Start: 2023-08-22

## (undated) RX ORDER — ONDANSETRON 2 MG/ML
INJECTION INTRAMUSCULAR; INTRAVENOUS
Status: DISPENSED
Start: 2021-11-05

## (undated) RX ORDER — BUPIVACAINE HYDROCHLORIDE 2.5 MG/ML
INJECTION, SOLUTION INFILTRATION; PERINEURAL
Status: DISPENSED
Start: 2022-10-21

## (undated) RX ORDER — OXYCODONE HYDROCHLORIDE 5 MG/1
TABLET ORAL
Status: DISPENSED
Start: 2022-10-21

## (undated) RX ORDER — ONDANSETRON 2 MG/ML
INJECTION INTRAMUSCULAR; INTRAVENOUS
Status: DISPENSED
Start: 2022-10-21

## (undated) RX ORDER — DEXAMETHASONE SODIUM PHOSPHATE 4 MG/ML
INJECTION, SOLUTION INTRA-ARTICULAR; INTRALESIONAL; INTRAMUSCULAR; INTRAVENOUS; SOFT TISSUE
Status: DISPENSED
Start: 2021-11-05